# Patient Record
Sex: FEMALE | Race: WHITE | NOT HISPANIC OR LATINO | Employment: UNEMPLOYED | URBAN - NONMETROPOLITAN AREA
[De-identification: names, ages, dates, MRNs, and addresses within clinical notes are randomized per-mention and may not be internally consistent; named-entity substitution may affect disease eponyms.]

---

## 2017-10-10 ENCOUNTER — LAB (OUTPATIENT)
Dept: LAB | Facility: HOSPITAL | Age: 22
End: 2017-10-10

## 2017-10-10 ENCOUNTER — TRANSCRIBE ORDERS (OUTPATIENT)
Dept: LAB | Facility: HOSPITAL | Age: 22
End: 2017-10-10

## 2017-10-10 DIAGNOSIS — R74.8 ELEVATED CK: Primary | ICD-10-CM

## 2017-10-10 DIAGNOSIS — R74.8 ELEVATED CK: ICD-10-CM

## 2017-10-10 LAB — CK SERPL-CCNC: 106 U/L (ref 30–170)

## 2017-10-10 PROCEDURE — 86038 ANTINUCLEAR ANTIBODIES: CPT | Performed by: INTERNAL MEDICINE

## 2017-10-10 PROCEDURE — 82550 ASSAY OF CK (CPK): CPT | Performed by: INTERNAL MEDICINE

## 2017-10-10 PROCEDURE — 36415 COLL VENOUS BLD VENIPUNCTURE: CPT

## 2017-10-10 PROCEDURE — 86235 NUCLEAR ANTIGEN ANTIBODY: CPT | Performed by: INTERNAL MEDICINE

## 2017-10-11 LAB
ANA SER QL: NEGATIVE
ENA JO1 AB SER-ACNC: <0.2 AI (ref 0–0.9)
ENA RNP AB SER-ACNC: 0.2 AI (ref 0–0.9)

## 2017-10-19 LAB — ENA: PM-SCL ANTIBODY*: 8.9 EU/ML

## 2017-12-12 ENCOUNTER — ANESTHESIA (OUTPATIENT)
Dept: PERIOP | Facility: HOSPITAL | Age: 22
End: 2017-12-12

## 2017-12-12 ENCOUNTER — HOSPITAL ENCOUNTER (OUTPATIENT)
Facility: HOSPITAL | Age: 22
Setting detail: OBSERVATION
Discharge: HOME | End: 2017-12-12
Attending: EMERGENCY MEDICINE | Admitting: OBSTETRICS & GYNECOLOGY

## 2017-12-12 ENCOUNTER — ANESTHESIA EVENT (OUTPATIENT)
Dept: PERIOP | Facility: HOSPITAL | Age: 22
End: 2017-12-12

## 2017-12-12 VITALS
TEMPERATURE: 97.6 F | DIASTOLIC BLOOD PRESSURE: 65 MMHG | RESPIRATION RATE: 16 BRPM | BODY MASS INDEX: 27.44 KG/M2 | WEIGHT: 174.8 LBS | OXYGEN SATURATION: 99 % | HEART RATE: 57 BPM | HEIGHT: 67 IN | SYSTOLIC BLOOD PRESSURE: 114 MMHG

## 2017-12-12 DIAGNOSIS — N83.209 RUPTURED OVARIAN CYST: Primary | ICD-10-CM

## 2017-12-12 DIAGNOSIS — R10.2 PELVIC PAIN IN FEMALE: ICD-10-CM

## 2017-12-12 LAB
HCG INTACT+B SERPL-ACNC: <5 MIU/ML
HCT VFR BLD AUTO: 33.5 % (ref 34.5–44)
HGB BLD-MCNC: 10.5 G/DL (ref 11.5–15.5)

## 2017-12-12 PROCEDURE — S0260 H&P FOR SURGERY: HCPCS | Performed by: OBSTETRICS & GYNECOLOGY

## 2017-12-12 PROCEDURE — 25010000002 ONDANSETRON PER 1 MG: Performed by: NURSE ANESTHETIST, CERTIFIED REGISTERED

## 2017-12-12 PROCEDURE — G0378 HOSPITAL OBSERVATION PER HR: HCPCS

## 2017-12-12 PROCEDURE — 96374 THER/PROPH/DIAG INJ IV PUSH: CPT

## 2017-12-12 PROCEDURE — 85014 HEMATOCRIT: CPT | Performed by: OBSTETRICS & GYNECOLOGY

## 2017-12-12 PROCEDURE — 96361 HYDRATE IV INFUSION ADD-ON: CPT

## 2017-12-12 PROCEDURE — 99201: CPT

## 2017-12-12 PROCEDURE — 25010000002 MORPHINE SULFATE (PF) 2 MG/ML SOLUTION: Performed by: OBSTETRICS & GYNECOLOGY

## 2017-12-12 PROCEDURE — 25010000002 NEOSTIGMINE 10 MG/10ML SOLUTION: Performed by: NURSE ANESTHETIST, CERTIFIED REGISTERED

## 2017-12-12 PROCEDURE — 25010000002 DEXAMETHASONE PER 1 MG: Performed by: NURSE ANESTHETIST, CERTIFIED REGISTERED

## 2017-12-12 PROCEDURE — 49320 DIAG LAPARO SEPARATE PROC: CPT | Performed by: OBSTETRICS & GYNECOLOGY

## 2017-12-12 PROCEDURE — 25010000002 MIDAZOLAM PER 1 MG: Performed by: NURSE ANESTHETIST, CERTIFIED REGISTERED

## 2017-12-12 PROCEDURE — 84702 CHORIONIC GONADOTROPIN TEST: CPT | Performed by: OBSTETRICS & GYNECOLOGY

## 2017-12-12 PROCEDURE — 25010000002 PROPOFOL 10 MG/ML EMULSION: Performed by: NURSE ANESTHETIST, CERTIFIED REGISTERED

## 2017-12-12 PROCEDURE — 25010000002 FENTANYL CITRATE (PF) 100 MCG/2ML SOLUTION: Performed by: NURSE ANESTHETIST, CERTIFIED REGISTERED

## 2017-12-12 PROCEDURE — 85018 HEMOGLOBIN: CPT | Performed by: OBSTETRICS & GYNECOLOGY

## 2017-12-12 PROCEDURE — 25010000002 KETOROLAC TROMETHAMINE PER 15 MG: Performed by: NURSE ANESTHETIST, CERTIFIED REGISTERED

## 2017-12-12 RX ORDER — LIDOCAINE HYDROCHLORIDE 10 MG/ML
INJECTION, SOLUTION EPIDURAL; INFILTRATION; INTRACAUDAL; PERINEURAL AS NEEDED
Status: DISCONTINUED | OUTPATIENT
Start: 2017-12-12 | End: 2017-12-12 | Stop reason: SURG

## 2017-12-12 RX ORDER — VITAMIN B COMPLEX
CAPSULE ORAL DAILY
COMMUNITY
End: 2018-09-11

## 2017-12-12 RX ORDER — DULOXETIN HYDROCHLORIDE 20 MG/1
20 CAPSULE, DELAYED RELEASE ORAL DAILY
COMMUNITY
End: 2018-09-11

## 2017-12-12 RX ORDER — OXYCODONE AND ACETAMINOPHEN 7.5; 325 MG/1; MG/1
2 TABLET ORAL EVERY 4 HOURS PRN
Status: DISCONTINUED | OUTPATIENT
Start: 2017-12-12 | End: 2017-12-13 | Stop reason: HOSPADM

## 2017-12-12 RX ORDER — GLYCOPYRROLATE 0.2 MG/ML
INJECTION INTRAMUSCULAR; INTRAVENOUS AS NEEDED
Status: DISCONTINUED | OUTPATIENT
Start: 2017-12-12 | End: 2017-12-12 | Stop reason: SURG

## 2017-12-12 RX ORDER — HYDROMORPHONE HYDROCHLORIDE 1 MG/ML
0.5 INJECTION, SOLUTION INTRAMUSCULAR; INTRAVENOUS; SUBCUTANEOUS
Status: DISCONTINUED | OUTPATIENT
Start: 2017-12-12 | End: 2017-12-12 | Stop reason: HOSPADM

## 2017-12-12 RX ORDER — MIDAZOLAM HYDROCHLORIDE 1 MG/ML
INJECTION INTRAMUSCULAR; INTRAVENOUS AS NEEDED
Status: DISCONTINUED | OUTPATIENT
Start: 2017-12-12 | End: 2017-12-12 | Stop reason: SURG

## 2017-12-12 RX ORDER — TRAMADOL HYDROCHLORIDE 50 MG/1
50 TABLET ORAL EVERY 6 HOURS PRN
Status: DISCONTINUED | OUTPATIENT
Start: 2017-12-12 | End: 2017-12-13 | Stop reason: HOSPADM

## 2017-12-12 RX ORDER — FAMOTIDINE 10 MG/ML
20 INJECTION, SOLUTION INTRAVENOUS ONCE
Status: DISCONTINUED | OUTPATIENT
Start: 2017-12-12 | End: 2017-12-12

## 2017-12-12 RX ORDER — OXYCODONE AND ACETAMINOPHEN 7.5; 325 MG/1; MG/1
1 TABLET ORAL EVERY 4 HOURS PRN
Qty: 15 TABLET | Refills: 0 | Status: SHIPPED | OUTPATIENT
Start: 2017-12-12 | End: 2018-09-11

## 2017-12-12 RX ORDER — SODIUM CHLORIDE, SODIUM LACTATE, POTASSIUM CHLORIDE, CALCIUM CHLORIDE 600; 310; 30; 20 MG/100ML; MG/100ML; MG/100ML; MG/100ML
9 INJECTION, SOLUTION INTRAVENOUS CONTINUOUS
Status: DISCONTINUED | OUTPATIENT
Start: 2017-12-12 | End: 2017-12-12 | Stop reason: HOSPADM

## 2017-12-12 RX ORDER — PROPOFOL 10 MG/ML
VIAL (ML) INTRAVENOUS AS NEEDED
Status: DISCONTINUED | OUTPATIENT
Start: 2017-12-12 | End: 2017-12-12 | Stop reason: SURG

## 2017-12-12 RX ORDER — FAMOTIDINE 20 MG/1
20 TABLET, FILM COATED ORAL ONCE
Status: COMPLETED | OUTPATIENT
Start: 2017-12-12 | End: 2017-12-12

## 2017-12-12 RX ORDER — SODIUM CHLORIDE 0.9 % (FLUSH) 0.9 %
1-10 SYRINGE (ML) INJECTION AS NEEDED
Status: DISCONTINUED | OUTPATIENT
Start: 2017-12-12 | End: 2017-12-12 | Stop reason: HOSPADM

## 2017-12-12 RX ORDER — ONDANSETRON 2 MG/ML
4 INJECTION INTRAMUSCULAR; INTRAVENOUS EVERY 6 HOURS PRN
Status: DISCONTINUED | OUTPATIENT
Start: 2017-12-12 | End: 2017-12-13 | Stop reason: HOSPADM

## 2017-12-12 RX ORDER — ONDANSETRON 4 MG/1
4 TABLET, ORALLY DISINTEGRATING ORAL EVERY 8 HOURS PRN
COMMUNITY
End: 2018-09-11

## 2017-12-12 RX ORDER — DEXAMETHASONE SODIUM PHOSPHATE 4 MG/ML
INJECTION, SOLUTION INTRA-ARTICULAR; INTRALESIONAL; INTRAMUSCULAR; INTRAVENOUS; SOFT TISSUE AS NEEDED
Status: DISCONTINUED | OUTPATIENT
Start: 2017-12-12 | End: 2017-12-12 | Stop reason: SURG

## 2017-12-12 RX ORDER — MAGNESIUM HYDROXIDE 1200 MG/15ML
LIQUID ORAL AS NEEDED
Status: DISCONTINUED | OUTPATIENT
Start: 2017-12-12 | End: 2017-12-12 | Stop reason: HOSPADM

## 2017-12-12 RX ORDER — AMITRIPTYLINE HYDROCHLORIDE 10 MG/1
10 TABLET, FILM COATED ORAL NIGHTLY
COMMUNITY
End: 2019-08-12

## 2017-12-12 RX ORDER — MORPHINE SULFATE 2 MG/ML
2 INJECTION, SOLUTION INTRAMUSCULAR; INTRAVENOUS ONCE
Status: COMPLETED | OUTPATIENT
Start: 2017-12-12 | End: 2017-12-12

## 2017-12-12 RX ORDER — SODIUM CHLORIDE 9 MG/ML
INJECTION, SOLUTION INTRAVENOUS AS NEEDED
Status: DISCONTINUED | OUTPATIENT
Start: 2017-12-12 | End: 2017-12-12 | Stop reason: HOSPADM

## 2017-12-12 RX ORDER — FENTANYL CITRATE 50 UG/ML
50 INJECTION, SOLUTION INTRAMUSCULAR; INTRAVENOUS
Status: DISCONTINUED | OUTPATIENT
Start: 2017-12-12 | End: 2017-12-12 | Stop reason: HOSPADM

## 2017-12-12 RX ORDER — DULOXETIN HYDROCHLORIDE 20 MG/1
20 CAPSULE, DELAYED RELEASE ORAL DAILY
Status: DISCONTINUED | OUTPATIENT
Start: 2017-12-12 | End: 2017-12-13 | Stop reason: HOSPADM

## 2017-12-12 RX ORDER — OXYCODONE AND ACETAMINOPHEN 7.5; 325 MG/1; MG/1
2 TABLET ORAL EVERY 4 HOURS PRN
Status: DISCONTINUED | OUTPATIENT
Start: 2017-12-12 | End: 2017-12-12 | Stop reason: HOSPADM

## 2017-12-12 RX ORDER — LIDOCAINE HYDROCHLORIDE 10 MG/ML
0.5 INJECTION, SOLUTION EPIDURAL; INFILTRATION; INTRACAUDAL; PERINEURAL ONCE AS NEEDED
Status: DISCONTINUED | OUTPATIENT
Start: 2017-12-12 | End: 2017-12-12

## 2017-12-12 RX ORDER — ACETAMINOPHEN 325 MG/1
650 TABLET ORAL EVERY 4 HOURS PRN
Status: DISCONTINUED | OUTPATIENT
Start: 2017-12-12 | End: 2017-12-12 | Stop reason: HOSPADM

## 2017-12-12 RX ORDER — NEOSTIGMINE METHYLSULFATE 1 MG/ML
INJECTION, SOLUTION INTRAVENOUS AS NEEDED
Status: DISCONTINUED | OUTPATIENT
Start: 2017-12-12 | End: 2017-12-12 | Stop reason: SURG

## 2017-12-12 RX ORDER — ONDANSETRON 2 MG/ML
INJECTION INTRAMUSCULAR; INTRAVENOUS AS NEEDED
Status: DISCONTINUED | OUTPATIENT
Start: 2017-12-12 | End: 2017-12-12 | Stop reason: SURG

## 2017-12-12 RX ORDER — FENTANYL CITRATE 50 UG/ML
INJECTION, SOLUTION INTRAMUSCULAR; INTRAVENOUS AS NEEDED
Status: DISCONTINUED | OUTPATIENT
Start: 2017-12-12 | End: 2017-12-12 | Stop reason: SURG

## 2017-12-12 RX ORDER — ROCURONIUM BROMIDE 10 MG/ML
INJECTION, SOLUTION INTRAVENOUS AS NEEDED
Status: DISCONTINUED | OUTPATIENT
Start: 2017-12-12 | End: 2017-12-12 | Stop reason: SURG

## 2017-12-12 RX ORDER — IBUPROFEN 600 MG/1
600 TABLET ORAL EVERY 4 HOURS PRN
Qty: 40 TABLET | Refills: 0 | Status: SHIPPED | OUTPATIENT
Start: 2017-12-12 | End: 2018-09-11

## 2017-12-12 RX ORDER — MORPHINE SULFATE 2 MG/ML
2 INJECTION, SOLUTION INTRAMUSCULAR; INTRAVENOUS EVERY 4 HOURS PRN
Status: DISCONTINUED | OUTPATIENT
Start: 2017-12-12 | End: 2017-12-13 | Stop reason: HOSPADM

## 2017-12-12 RX ORDER — IBUPROFEN 600 MG/1
600 TABLET ORAL EVERY 4 HOURS PRN
Status: DISCONTINUED | OUTPATIENT
Start: 2017-12-12 | End: 2017-12-13 | Stop reason: HOSPADM

## 2017-12-12 RX ORDER — ONDANSETRON 4 MG/1
4 TABLET, FILM COATED ORAL EVERY 6 HOURS PRN
Status: DISCONTINUED | OUTPATIENT
Start: 2017-12-12 | End: 2017-12-13 | Stop reason: HOSPADM

## 2017-12-12 RX ORDER — SODIUM CHLORIDE, SODIUM LACTATE, POTASSIUM CHLORIDE, CALCIUM CHLORIDE 600; 310; 30; 20 MG/100ML; MG/100ML; MG/100ML; MG/100ML
125 INJECTION, SOLUTION INTRAVENOUS CONTINUOUS
Status: DISCONTINUED | OUTPATIENT
Start: 2017-12-12 | End: 2017-12-12 | Stop reason: HOSPADM

## 2017-12-12 RX ORDER — KETOROLAC TROMETHAMINE 30 MG/ML
INJECTION, SOLUTION INTRAMUSCULAR; INTRAVENOUS AS NEEDED
Status: DISCONTINUED | OUTPATIENT
Start: 2017-12-12 | End: 2017-12-12 | Stop reason: SURG

## 2017-12-12 RX ORDER — AMITRIPTYLINE HYDROCHLORIDE 10 MG/1
10 TABLET, FILM COATED ORAL NIGHTLY
Status: DISCONTINUED | OUTPATIENT
Start: 2017-12-12 | End: 2017-12-13 | Stop reason: HOSPADM

## 2017-12-12 RX ORDER — BUPIVACAINE HYDROCHLORIDE AND EPINEPHRINE 5; 5 MG/ML; UG/ML
INJECTION, SOLUTION PERINEURAL AS NEEDED
Status: DISCONTINUED | OUTPATIENT
Start: 2017-12-12 | End: 2017-12-12 | Stop reason: HOSPADM

## 2017-12-12 RX ORDER — ONDANSETRON 2 MG/ML
4 INJECTION INTRAMUSCULAR; INTRAVENOUS ONCE AS NEEDED
Status: COMPLETED | OUTPATIENT
Start: 2017-12-12 | End: 2017-12-12

## 2017-12-12 RX ORDER — OXYCODONE AND ACETAMINOPHEN 7.5; 325 MG/1; MG/1
1 TABLET ORAL EVERY 4 HOURS PRN
Status: DISCONTINUED | OUTPATIENT
Start: 2017-12-12 | End: 2017-12-12 | Stop reason: HOSPADM

## 2017-12-12 RX ORDER — NALOXONE HCL 0.4 MG/ML
0.4 VIAL (ML) INJECTION
Status: DISCONTINUED | OUTPATIENT
Start: 2017-12-12 | End: 2017-12-13 | Stop reason: HOSPADM

## 2017-12-12 RX ADMIN — MIDAZOLAM HYDROCHLORIDE 2 MG: 1 INJECTION, SOLUTION INTRAMUSCULAR; INTRAVENOUS at 13:06

## 2017-12-12 RX ADMIN — FENTANYL CITRATE 100 MCG: 50 INJECTION, SOLUTION INTRAMUSCULAR; INTRAVENOUS at 13:06

## 2017-12-12 RX ADMIN — KETOROLAC TROMETHAMINE 30 MG: 30 INJECTION, SOLUTION INTRAMUSCULAR at 13:35

## 2017-12-12 RX ADMIN — ROCURONIUM BROMIDE 25 MG: 10 SOLUTION INTRAVENOUS at 13:11

## 2017-12-12 RX ADMIN — OXYCODONE HYDROCHLORIDE AND ACETAMINOPHEN 1 TABLET: 7.5; 325 TABLET ORAL at 03:45

## 2017-12-12 RX ADMIN — NEOSTIGMINE METHYLSULFATE 4 MG: 1 INJECTION, SOLUTION INTRAVENOUS at 13:37

## 2017-12-12 RX ADMIN — FENTANYL CITRATE 50 MCG: 50 INJECTION INTRAMUSCULAR; INTRAVENOUS at 14:06

## 2017-12-12 RX ADMIN — FAMOTIDINE 20 MG: 20 TABLET, FILM COATED ORAL at 12:52

## 2017-12-12 RX ADMIN — DEXAMETHASONE SODIUM PHOSPHATE 4 MG: 4 INJECTION, SOLUTION INTRAMUSCULAR; INTRAVENOUS at 13:25

## 2017-12-12 RX ADMIN — SODIUM CHLORIDE, POTASSIUM CHLORIDE, SODIUM LACTATE AND CALCIUM CHLORIDE 125 ML/HR: 600; 310; 30; 20 INJECTION, SOLUTION INTRAVENOUS at 14:29

## 2017-12-12 RX ADMIN — ONDANSETRON 4 MG: 2 INJECTION INTRAMUSCULAR; INTRAVENOUS at 13:25

## 2017-12-12 RX ADMIN — PROPOFOL 180 MG: 10 INJECTION, EMULSION INTRAVENOUS at 13:11

## 2017-12-12 RX ADMIN — SODIUM CHLORIDE, POTASSIUM CHLORIDE, SODIUM LACTATE AND CALCIUM CHLORIDE 125 ML/HR: 600; 310; 30; 20 INJECTION, SOLUTION INTRAVENOUS at 10:00

## 2017-12-12 RX ADMIN — FENTANYL CITRATE 50 MCG: 50 INJECTION INTRAMUSCULAR; INTRAVENOUS at 14:27

## 2017-12-12 RX ADMIN — LIDOCAINE HYDROCHLORIDE 80 MG: 10 INJECTION, SOLUTION EPIDURAL; INFILTRATION; INTRACAUDAL; PERINEURAL at 13:11

## 2017-12-12 RX ADMIN — MORPHINE SULFATE 2 MG: 2 INJECTION, SOLUTION INTRAMUSCULAR; INTRAVENOUS at 11:17

## 2017-12-12 RX ADMIN — SODIUM CHLORIDE, POTASSIUM CHLORIDE, SODIUM LACTATE AND CALCIUM CHLORIDE 9 ML/HR: 600; 310; 30; 20 INJECTION, SOLUTION INTRAVENOUS at 12:52

## 2017-12-12 RX ADMIN — GLYCOPYRROLATE 0.6 MG: 0.2 INJECTION, SOLUTION INTRAMUSCULAR; INTRAVENOUS at 13:37

## 2017-12-12 RX ADMIN — ONDANSETRON 4 MG: 2 INJECTION INTRAMUSCULAR; INTRAVENOUS at 14:52

## 2017-12-12 NOTE — PLAN OF CARE
Problem: Patient Care Overview (Adult)  Goal: Plan of Care Review  Outcome: Ongoing (interventions implemented as appropriate)    12/12/17 0500   Coping/Psychosocial Response Interventions   Plan Of Care Reviewed With patient   Patient Care Overview   Progress progress toward functional goals as expected   Outcome Evaluation   Outcome Summary/Follow up Plan VSS. pt presented to unit with pain in RLQ. med given in ER. Waiting for new orders.        Goal: Adult Individualization and Mutuality  Outcome: Ongoing (interventions implemented as appropriate)  Goal: Discharge Needs Assessment  Outcome: Ongoing (interventions implemented as appropriate)

## 2017-12-12 NOTE — H&P
KARIS Arellano  Ashish Wilkerson  : 1995  MRN: [id number]  CSN: [id number]    History and Physical    Subjective   Ashish Wilkerson is a 22 y.o. year old No obstetric history on file. who presented in transfer with abdominal pain due to a ruptured hemorrhagic ovarian cyst.  The CT scan seem to exclude acute appendicitis she's been afebrile her white count is normal.  Her pain was acute in onset about 1800 hrs. on 17.  Her hematocrit is stable she is in moderate to severe pain which is unrelenting.  She does not appear to be able to participate and the activities of daily living.  After a significant period of observation her pain is not improving.  We discussed management options which include observation and analgesia as well as laparoscopic management of the hemoperitoneum.  The patient is well aware now that her pain may be different it may be improved it may be worsened in the context of surgical management.  She is aware of the risk of surgery and general and those specific to laparoscopy.  Her blood pregnancy test was negative at the referring hospital.  Her last menstrual period was 2 weeks ago.  She is sexually active she is not using birth control    Past Medical History:   Diagnosis Date   • Migraine      Past Surgical History:   Procedure Laterality Date   •  SECTION     • DENTAL PROCEDURE       Smoking status: Never Smoker                                                                 Smokeless status: Not on file                         Current Facility-Administered Medications:   •  acetaminophen (TYLENOL) tablet 650 mg, 650 mg, Oral, Q4H PRN, Dami Allred MD  •  oxyCODONE-acetaminophen (PERCOCET) 7.5-325 MG per tablet 1 tablet, 1 tablet, Oral, Q4H PRN, Dami Allred MD, 1 tablet at 17 0345  •  oxyCODONE-acetaminophen (PERCOCET) 7.5-325 MG per tablet 2 tablet, 2 tablet, Oral, Q4H PRN, Dami Allred MD  •  sodium chloride 0.9 % flush 1-10 mL, 1-10 mL,  "Intravenous, PRN, Dami Allred MD    No Known Allergies    Review of Systems      Objective   /56  Pulse 69  Temp 98.3 °F (36.8 °C)  Resp 18  Ht 170.2 cm (67\")  Wt 79.3 kg (174 lb 12.8 oz)  SpO2 98%  BMI 27.38 kg/m2  General: well developed; well nourished  moderately distressed   Heart: regular rate and rhythm, S1, S2 normal, no murmur, click, rub or gallop   Lungs: breathing is unlabored   Abdomen: no umbilical or inginual hernias are present  no hepato-splenomegaly  Her abdomen appears mildly distended there is diffuse tenderness throughout the abdomen with some rebound   Pelvis:: The pelvic exam will be performed under the benefit of anesthesia   Labs  Lab Results   Component Value Date     (L) 03/17/2015    HGB 10.5 (L) 12/12/2017    HCT 33.5 (L) 12/12/2017    WBC 21.5 (H) 03/17/2015        Assessment   1. Ruptured hemorrhagic ovarian cyst with persistent moderate to severe abdominal pain     Plan   1. Diagnostic laparoscopy    Dandy Shelby MD  12/12/2017  9:49 AM           "

## 2017-12-12 NOTE — BRIEF OP NOTE
DIAGNOSTIC LAPAROSCOPY  Progress Note    Ashish Wilkerson  12/12/2017    Pre-op Diagnosis:   Ruptured ovarian cyst [N83.209]       Post-Op Diagnosis Codes:     Normal pelvis    Procedure/CPT® Codes:      Procedure(s):  DIAGNOSTIC LAPAROSCOPY    Surgeon(s):  Dandy Shelby MD    Anesthesia: General    Staff:   Circulator: Jaycob Vicente RN  Scrub Person: Alyssa Clark  Assistant: ALFA Bird    Estimated Blood Loss: none    Urine Voided: 250 mL    Specimens:                None      Drains:   Urethral Catheter 12/12/17 1326 100% silicone 16 (Active)           Findings: Normal pelvis    Complications: None      Dandy Shelby MD     Date: 12/12/2017  Time: 1:44 PM

## 2017-12-12 NOTE — PLAN OF CARE
Problem: Perioperative Period (Adult)  Goal: Signs and Symptoms of Listed Potential Problems Will be Absent or Manageable (Perioperative Period)  Outcome: Ongoing (interventions implemented as appropriate)    12/12/17 1233   Perioperative Period   Problems Assessed (Perioperative Period) pain   Problems Present (Perioperative Period) none

## 2017-12-12 NOTE — OP NOTE
DIAGNOSTIC LAPAROSCOPY  Procedure Note    Ashish Wilkerson  2017    Pre-op Diagnosis:   Ruptured ovarian cyst [N83.209]    Post-op Diagnosis:     Pelvic pain    Procedure(s):  DIAGNOSTIC LAPAROSCOPY    Surgeon(s):  Dandy Shelby MD    Anesthesia: General    Staff:   Circulator: Jaycob Vicente RN  Scrub Person: Alyssa Clark  Assistant: ALFA Bird    Estimated Blood Loss: none    Specimens:                * No orders in the log *      Drains:   Urethral Catheter 17 1326 100% silicone 16 (Active)           Indications: This patient is a 22-year-old female admitted the in transfer from [facility] with the acute onset of pelvic pain.  The working diagnosis by her clinical presentation and imaging studies which had excluded the possibility of appendicitis was a ruptured hemorrhagic ovarian cyst.  Her hematocrit was stable.  However her pain was profound and incapacitating.  She was unable to tolerate a further period of observation and the decision was made to proceed with diagnostic laparoscopy for both diagnosis and treatment.  She was counseled extensively with respect to the risk of surgery and general and the risk of this procedure specifically further the patient was made explicitly aware that this procedure old might not cause her pain to be improved and indeed may cause her to have worse pain.    Findings: At the time of laparoscopy her uterus was retroverted normal in size and shape and freely mobile and free of adhesions.  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  There was no evidence of endometriosis of her peritoneum or ovaries.  There was minimal blood tinged fluid in her cul-de-sac.  The fallopian tubes were normal in caliber and character.  The ovaries were normal in size shape.  They were freely mobile and free of adhesions there were no cysts on either ovary.   Her appendix was well visualized and appeared normal the upper abdomen was well visualized and appeared normal both in the right upper quadrant and left upper quadrant area and the left lower quadrant appeared normal    Operative procedure: The patient was taken to the operating room and after the induction of general anesthesia was placed in the dorsal lithotomy position where her abdomen perineum and vagina were sterilely prepped and draped in the usual fashion.  A Mckeon catheter was placed in the bladder.  The acorn uterine manipulator was placed in the cervix.  Through a 1 cm umbilical incision and the 12 mm trocar was inserted under direct laparoscopic visualization.  The abdomen was insufflated with carbon dioxide gas and the 5 mm trocar was inserted through a 1 cm suprapubic incision.  The above laparoscopic findings were noted and photographs taken.  What little lead tinged fluid that was in the cul-de-sac was irrigated and suctioned.  The 5 mm trocar was removed under direct laparoscopic visualization.  The gas was allowed to escape and the 12 mm trocar was removed.  The incisions were closed with 4-0 Vicryl in a subcuticular fashion.  The patient tolerated the procedure well and was taken to the recovery room in stable condition  .   Complications: There were no complications      Dandy Shelby MD     Date: 12/12/2017  Time: 1:49 PM

## 2018-01-11 ENCOUNTER — HOSPITAL ENCOUNTER (OUTPATIENT)
Dept: OTHER | Age: 23
Discharge: HOME | End: 2018-01-11
Attending: NURSE PRACTITIONER | Admitting: NURSE PRACTITIONER

## 2018-01-11 DIAGNOSIS — R10.32 ABDOMINAL PAIN, LEFT LOWER QUADRANT: ICD-10-CM

## 2018-01-19 ENCOUNTER — HOSPITAL ENCOUNTER (OUTPATIENT)
Dept: GENERAL RADIOLOGY | Age: 23
Discharge: HOME | End: 2018-01-19
Attending: NURSE PRACTITIONER | Admitting: NURSE PRACTITIONER

## 2018-01-19 DIAGNOSIS — R10.32 LEFT LOWER QUADRANT PAIN: ICD-10-CM

## 2018-01-19 DIAGNOSIS — R10.32 ABDOMINAL PAIN, LEFT LOWER QUADRANT: ICD-10-CM

## 2018-03-05 ENCOUNTER — HOSPITAL ENCOUNTER (EMERGENCY)
Facility: HOSPITAL | Age: 23
Discharge: HOME | End: 2018-03-05
Attending: EMERGENCY MEDICINE | Admitting: EMERGENCY MEDICINE

## 2018-03-05 VITALS
DIASTOLIC BLOOD PRESSURE: 71 MMHG | RESPIRATION RATE: 16 BRPM | HEART RATE: 81 BPM | SYSTOLIC BLOOD PRESSURE: 123 MMHG | TEMPERATURE: 98 F | WEIGHT: 170 LBS | OXYGEN SATURATION: 99 % | HEIGHT: 67 IN | BODY MASS INDEX: 26.68 KG/M2

## 2018-03-05 DIAGNOSIS — J02.9 SORE THROAT: Primary | ICD-10-CM

## 2018-03-05 LAB — S PYO AG THROAT QL: NEGATIVE

## 2018-03-05 PROCEDURE — 87880 STREP A ASSAY W/OPTIC: CPT | Performed by: PHYSICIAN ASSISTANT

## 2018-03-05 PROCEDURE — 87081 CULTURE SCREEN ONLY: CPT | Performed by: PHYSICIAN ASSISTANT

## 2018-03-05 PROCEDURE — 99283 EMERGENCY DEPT VISIT LOW MDM: CPT

## 2018-03-05 RX ORDER — NAPROXEN 500 MG/1
500 TABLET ORAL 2 TIMES DAILY WITH MEALS
Qty: 20 TABLET | Refills: 0 | Status: SHIPPED | OUTPATIENT
Start: 2018-03-05 | End: 2018-09-11

## 2018-03-06 NOTE — ED PROVIDER NOTES
Subjective   HPI Comments: 22-year-old female complaining of 4 day history of bilateral earache and 2 day history of sore throat.  She denies any fever, cough, congestion, abdominal pain, vomiting or diarrhea.  Her last menstrual cycle was last week of February and she denies pregnancy.  Mom has been giving her numbing drops for her earache with out relief.  Patient states she has a history of strep throat and feels like she has strep.      History provided by:  Patient  History limited by: no limits.   used: No        Review of Systems   Constitutional: Negative for activity change, appetite change, fatigue and fever.   HENT: Positive for sore throat. Negative for congestion, ear pain, rhinorrhea and sneezing.    Eyes: Negative for pain, discharge and redness.   Respiratory: Negative for cough, shortness of breath and wheezing.    Cardiovascular: Negative.    Gastrointestinal: Positive for nausea. Negative for abdominal pain, constipation, diarrhea and vomiting.   Endocrine: Negative.    Genitourinary: Negative for difficulty urinating, dysuria and frequency.   Musculoskeletal: Negative for back pain and neck pain.   Skin: Negative for color change, pallor and rash.   Allergic/Immunologic: Negative.    Neurological: Negative.    Hematological: Negative.    Psychiatric/Behavioral: Negative.    All other systems reviewed and are negative.      Past Medical History:   Diagnosis Date   • Migraine        No Known Allergies    Past Surgical History:   Procedure Laterality Date   •  SECTION     • DENTAL PROCEDURE     • DIAGNOSTIC LAPAROSCOPY N/A 2017    Procedure: DIAGNOSTIC LAPAROSCOPY;  Surgeon: Dandy Shelby MD;  Location: [facility];  Service:        History reviewed. No pertinent family history.    Social History     Social History   • Marital status:      Social History Main Topics   • Smoking status: Never Smoker   • Alcohol use No   • Drug use: No   • Sexual activity:  Defer           Objective   Physical Exam   Constitutional: She is oriented to person, place, and time. She appears well-developed and well-nourished. No distress.   HENT:   Head: Normocephalic and atraumatic.   Right Ear: External ear normal.   Left Ear: External ear normal.   Nose: Nose normal.   Mouth/Throat: Oropharynx is clear and moist. No oropharyngeal exudate.   Eyes: Conjunctivae and EOM are normal. Pupils are equal, round, and reactive to light. Right eye exhibits no discharge. Left eye exhibits no discharge. No scleral icterus.   Neck: Normal range of motion. Neck supple. No JVD present. No tracheal deviation present.   Cardiovascular: Normal rate, regular rhythm and normal heart sounds.    Pulmonary/Chest: Effort normal and breath sounds normal. No stridor. No respiratory distress. She has no wheezes. She has no rales.   Abdominal: Soft. Bowel sounds are normal. She exhibits no distension and no mass. There is no tenderness. There is no rebound and no guarding. No hernia.   Musculoskeletal: Normal range of motion. She exhibits no edema, tenderness or deformity.   Neurological: She is alert and oriented to person, place, and time. No cranial nerve deficit. Coordination normal.   Skin: Skin is warm and dry. No rash noted. She is not diaphoretic. No erythema.   Psychiatric: She has a normal mood and affect. Her behavior is normal. Judgment and thought content normal.   Nursing note and vitals reviewed.      Procedures         ED Course  ED Course   Comment By Time   Strep screen negative. Katy Valverde PA-C 03/05 2140   Discussed dx of pharyngitis with pt, advised culture is pending and hospital will contact patient if positive.  Prescription given for naproxen and patient to alternate with Tylenol, use throat lozenges and warm salt water gargles.  All questions answered to her satisfaction, she is agreeable to the treatment plan and to be discharged home. Katy Valverde PA-C 03/05 2144                  [facility]  Number  of Diagnoses or Management Options  Sore throat: new and requires workup     Amount and/or Complexity of Data Reviewed  Clinical lab tests: ordered and reviewed    Risk of Complications, Morbidity, and/or Mortality  Presenting problems: moderate  Diagnostic procedures: low  Management options: moderate    Patient Progress  Patient progress: stable      Final diagnoses:   Sore throat            Katy Valverde PA-C  03/05/18 3932

## 2018-03-06 NOTE — DISCHARGE INSTRUCTIONS
Take naproxen with meals to avoid upset stomach.  You may alternate with Tylenol every 4 hours.  Warm salt water gargles.  Throat lozenges.  Have  recheck with your primary care physician in 2-3 days if not improved.  If your culture is positive the hospital will contact you with further instruction.

## 2018-03-07 LAB — BACTERIA SPEC AEROBE CULT: NORMAL

## 2018-06-25 ENCOUNTER — HOSPITAL ENCOUNTER (EMERGENCY)
Facility: HOSPITAL | Age: 23
Discharge: HOME | End: 2018-06-25
Attending: EMERGENCY MEDICINE | Admitting: EMERGENCY MEDICINE

## 2018-06-25 ENCOUNTER — APPOINTMENT (OUTPATIENT)
Dept: CT IMAGING | Facility: HOSPITAL | Age: 23
End: 2018-06-25

## 2018-06-25 VITALS
TEMPERATURE: 98.1 F | RESPIRATION RATE: 18 BRPM | HEART RATE: 75 BPM | HEIGHT: 67 IN | WEIGHT: 180.4 LBS | DIASTOLIC BLOOD PRESSURE: 60 MMHG | BODY MASS INDEX: 28.31 KG/M2 | OXYGEN SATURATION: 100 % | SYSTOLIC BLOOD PRESSURE: 116 MMHG

## 2018-06-25 DIAGNOSIS — N94.6 DYSMENORRHEA: Primary | ICD-10-CM

## 2018-06-25 LAB
ALBUMIN SERPL-MCNC: 4.3 G/DL (ref 3.5–5)
ALBUMIN/GLOB SERPL: 1.3 G/DL (ref 1–2)
ALP SERPL-CCNC: 75 U/L (ref 38–126)
ALT SERPL W P-5'-P-CCNC: 19 U/L (ref 13–69)
ANION GAP SERPL CALCULATED.3IONS-SCNC: 14.8 MMOL/L (ref 10–20)
AST SERPL-CCNC: 22 U/L (ref 15–46)
B-HCG UR QL: NEGATIVE
BACTERIA UR QL AUTO: NORMAL /HPF
BASOPHILS # BLD AUTO: 0.07 10*3/MM3 (ref 0–0.2)
BASOPHILS NFR BLD AUTO: 0.8 % (ref 0–2.5)
BILIRUB SERPL-MCNC: 0.5 MG/DL (ref 0.2–1.3)
BILIRUB UR QL STRIP: NEGATIVE
BUN BLD-MCNC: 9 MG/DL (ref 7–20)
BUN/CREAT SERPL: 12.9 (ref 7.1–23.5)
CALCIUM SPEC-SCNC: 9 MG/DL (ref 8.4–10.2)
CHLORIDE SERPL-SCNC: 104 MMOL/L (ref 98–107)
CLARITY UR: CLEAR
CO2 SERPL-SCNC: 27 MMOL/L (ref 26–30)
COLOR UR: YELLOW
CREAT BLD-MCNC: 0.7 MG/DL (ref 0.6–1.3)
DEPRECATED RDW RBC AUTO: 42.2 FL (ref 37–54)
EOSINOPHIL # BLD AUTO: 0.18 10*3/MM3 (ref 0–0.7)
EOSINOPHIL NFR BLD AUTO: 2.1 % (ref 0–7)
ERYTHROCYTE [DISTWIDTH] IN BLOOD BY AUTOMATED COUNT: 14.5 % (ref 11.5–14.5)
GFR SERPL CREATININE-BSD FRML MDRD: 106 ML/MIN/1.73
GLOBULIN UR ELPH-MCNC: 3.2 GM/DL
GLUCOSE BLD-MCNC: 83 MG/DL (ref 74–98)
GLUCOSE UR STRIP-MCNC: NEGATIVE MG/DL
HCT VFR BLD AUTO: 35.8 % (ref 37–47)
HGB BLD-MCNC: 11.9 G/DL (ref 12–16)
HGB UR QL STRIP.AUTO: ABNORMAL
HOLD SPECIMEN: NORMAL
HOLD SPECIMEN: NORMAL
HYALINE CASTS UR QL AUTO: NORMAL /LPF
IMM GRANULOCYTES # BLD: 0.04 10*3/MM3 (ref 0–0.06)
IMM GRANULOCYTES NFR BLD: 0.5 % (ref 0–0.6)
KETONES UR QL STRIP: NEGATIVE
LEUKOCYTE ESTERASE UR QL STRIP.AUTO: NEGATIVE
LIPASE SERPL-CCNC: 33 U/L (ref 23–300)
LYMPHOCYTES # BLD AUTO: 2.21 10*3/MM3 (ref 0.6–3.4)
LYMPHOCYTES NFR BLD AUTO: 25.9 % (ref 10–50)
MCH RBC QN AUTO: 26.6 PG (ref 27–31)
MCHC RBC AUTO-ENTMCNC: 33.2 G/DL (ref 30–37)
MCV RBC AUTO: 80.1 FL (ref 81–99)
MONOCYTES # BLD AUTO: 0.63 10*3/MM3 (ref 0–0.9)
MONOCYTES NFR BLD AUTO: 7.4 % (ref 0–12)
NEUTROPHILS # BLD AUTO: 5.4 10*3/MM3 (ref 2–6.9)
NEUTROPHILS NFR BLD AUTO: 63.3 % (ref 37–80)
NITRITE UR QL STRIP: NEGATIVE
NRBC BLD MANUAL-RTO: 0 /100 WBC (ref 0–0)
PH UR STRIP.AUTO: 6 [PH] (ref 5–8)
PLATELET # BLD AUTO: 249 10*3/MM3 (ref 130–400)
PMV BLD AUTO: 11.3 FL (ref 6–12)
POTASSIUM BLD-SCNC: 3.8 MMOL/L (ref 3.5–5.1)
PROT SERPL-MCNC: 7.5 G/DL (ref 6.3–8.2)
PROT UR QL STRIP: NEGATIVE
RBC # BLD AUTO: 4.47 10*6/MM3 (ref 4.2–5.4)
RBC # UR: NORMAL /HPF
REF LAB TEST METHOD: NORMAL
SODIUM BLD-SCNC: 142 MMOL/L (ref 137–145)
SP GR UR STRIP: 1.02 (ref 1–1.03)
SQUAMOUS #/AREA URNS HPF: NORMAL /HPF
UROBILINOGEN UR QL STRIP: ABNORMAL
WBC NRBC COR # BLD: 8.53 10*3/MM3 (ref 4.8–10.8)
WBC UR QL AUTO: NORMAL /HPF
WHOLE BLOOD HOLD SPECIMEN: NORMAL
WHOLE BLOOD HOLD SPECIMEN: NORMAL

## 2018-06-25 PROCEDURE — 99284 EMERGENCY DEPT VISIT MOD MDM: CPT

## 2018-06-25 PROCEDURE — 25010000002 ONDANSETRON PER 1 MG: Performed by: PHYSICIAN ASSISTANT

## 2018-06-25 PROCEDURE — 96374 THER/PROPH/DIAG INJ IV PUSH: CPT

## 2018-06-25 PROCEDURE — 25010000002 KETOROLAC TROMETHAMINE PER 15 MG: Performed by: PHYSICIAN ASSISTANT

## 2018-06-25 PROCEDURE — 81025 URINE PREGNANCY TEST: CPT | Performed by: PHYSICIAN ASSISTANT

## 2018-06-25 PROCEDURE — 85025 COMPLETE CBC W/AUTO DIFF WBC: CPT | Performed by: PHYSICIAN ASSISTANT

## 2018-06-25 PROCEDURE — 83690 ASSAY OF LIPASE: CPT | Performed by: PHYSICIAN ASSISTANT

## 2018-06-25 PROCEDURE — 96375 TX/PRO/DX INJ NEW DRUG ADDON: CPT

## 2018-06-25 PROCEDURE — 25010000002 IOPAMIDOL 61 % SOLUTION: Performed by: EMERGENCY MEDICINE

## 2018-06-25 PROCEDURE — 74177 CT ABD & PELVIS W/CONTRAST: CPT

## 2018-06-25 PROCEDURE — 81001 URINALYSIS AUTO W/SCOPE: CPT | Performed by: PHYSICIAN ASSISTANT

## 2018-06-25 PROCEDURE — 80053 COMPREHEN METABOLIC PANEL: CPT | Performed by: PHYSICIAN ASSISTANT

## 2018-06-25 RX ORDER — ONDANSETRON 2 MG/ML
4 INJECTION INTRAMUSCULAR; INTRAVENOUS ONCE
Status: COMPLETED | OUTPATIENT
Start: 2018-06-25 | End: 2018-06-25

## 2018-06-25 RX ORDER — SODIUM CHLORIDE 0.9 % (FLUSH) 0.9 %
10 SYRINGE (ML) INJECTION AS NEEDED
Status: DISCONTINUED | OUTPATIENT
Start: 2018-06-25 | End: 2018-06-25 | Stop reason: HOSPADM

## 2018-06-25 RX ORDER — KETOROLAC TROMETHAMINE 30 MG/ML
30 INJECTION, SOLUTION INTRAMUSCULAR; INTRAVENOUS ONCE
Status: COMPLETED | OUTPATIENT
Start: 2018-06-25 | End: 2018-06-25

## 2018-06-25 RX ADMIN — IOPAMIDOL 100 ML: 612 INJECTION, SOLUTION INTRAVENOUS at 15:11

## 2018-06-25 RX ADMIN — KETOROLAC TROMETHAMINE 30 MG: 30 INJECTION, SOLUTION INTRAMUSCULAR at 13:49

## 2018-06-25 RX ADMIN — ONDANSETRON 4 MG: 2 INJECTION INTRAMUSCULAR; INTRAVENOUS at 13:49

## 2018-07-27 ENCOUNTER — HOSPITAL ENCOUNTER (EMERGENCY)
Facility: HOSPITAL | Age: 23
Discharge: HOME | End: 2018-07-27
Attending: EMERGENCY MEDICINE | Admitting: EMERGENCY MEDICINE

## 2018-07-27 VITALS
HEIGHT: 67 IN | SYSTOLIC BLOOD PRESSURE: 137 MMHG | HEART RATE: 89 BPM | DIASTOLIC BLOOD PRESSURE: 85 MMHG | OXYGEN SATURATION: 99 % | WEIGHT: 183 LBS | TEMPERATURE: 98.3 F | RESPIRATION RATE: 16 BRPM | BODY MASS INDEX: 28.72 KG/M2

## 2018-07-27 DIAGNOSIS — N93.9 VAGINAL BLEEDING: Primary | ICD-10-CM

## 2018-07-27 LAB
ABO GROUP BLD: NORMAL
ALBUMIN SERPL-MCNC: 4.2 G/DL (ref 3.5–5)
ALBUMIN/GLOB SERPL: 1.3 G/DL (ref 1–2)
ALP SERPL-CCNC: 87 U/L (ref 38–126)
ALT SERPL W P-5'-P-CCNC: 21 U/L (ref 13–69)
ANION GAP SERPL CALCULATED.3IONS-SCNC: 10.9 MMOL/L (ref 10–20)
AST SERPL-CCNC: 19 U/L (ref 15–46)
BACTERIA UR QL AUTO: ABNORMAL /HPF
BASOPHILS # BLD AUTO: 0.04 10*3/MM3 (ref 0–0.2)
BASOPHILS NFR BLD AUTO: 0.7 % (ref 0–2.5)
BILIRUB SERPL-MCNC: 0.5 MG/DL (ref 0.2–1.3)
BILIRUB UR QL STRIP: NEGATIVE
BUN BLD-MCNC: 9 MG/DL (ref 7–20)
BUN/CREAT SERPL: 12.9 (ref 7.1–23.5)
CALCIUM SPEC-SCNC: 9.2 MG/DL (ref 8.4–10.2)
CHLORIDE SERPL-SCNC: 106 MMOL/L (ref 98–107)
CLARITY UR: CLEAR
CO2 SERPL-SCNC: 26 MMOL/L (ref 26–30)
COLOR UR: YELLOW
CREAT BLD-MCNC: 0.7 MG/DL (ref 0.6–1.3)
DEPRECATED RDW RBC AUTO: 41.1 FL (ref 37–54)
EOSINOPHIL # BLD AUTO: 0.11 10*3/MM3 (ref 0–0.7)
EOSINOPHIL NFR BLD AUTO: 1.8 % (ref 0–7)
ERYTHROCYTE [DISTWIDTH] IN BLOOD BY AUTOMATED COUNT: 13.8 % (ref 11.5–14.5)
GFR SERPL CREATININE-BSD FRML MDRD: 106 ML/MIN/1.73
GLOBULIN UR ELPH-MCNC: 3.2 GM/DL
GLUCOSE BLD-MCNC: 89 MG/DL (ref 74–98)
GLUCOSE UR STRIP-MCNC: NEGATIVE MG/DL
HCG INTACT+B SERPL-ACNC: 2.44 MIU/ML
HCT VFR BLD AUTO: 36.9 % (ref 37–47)
HGB BLD-MCNC: 11.8 G/DL (ref 12–16)
HGB UR QL STRIP.AUTO: ABNORMAL
HYALINE CASTS UR QL AUTO: ABNORMAL /LPF
IMM GRANULOCYTES # BLD: 0.02 10*3/MM3 (ref 0–0.06)
IMM GRANULOCYTES NFR BLD: 0.3 % (ref 0–0.6)
KETONES UR QL STRIP: NEGATIVE
LEUKOCYTE ESTERASE UR QL STRIP.AUTO: NEGATIVE
LYMPHOCYTES # BLD AUTO: 1.9 10*3/MM3 (ref 0.6–3.4)
LYMPHOCYTES NFR BLD AUTO: 31.5 % (ref 10–50)
MCH RBC QN AUTO: 26.2 PG (ref 27–31)
MCHC RBC AUTO-ENTMCNC: 32 G/DL (ref 30–37)
MCV RBC AUTO: 81.8 FL (ref 81–99)
MONOCYTES # BLD AUTO: 0.43 10*3/MM3 (ref 0–0.9)
MONOCYTES NFR BLD AUTO: 7.1 % (ref 0–12)
NEUTROPHILS # BLD AUTO: 3.54 10*3/MM3 (ref 2–6.9)
NEUTROPHILS NFR BLD AUTO: 58.6 % (ref 37–80)
NITRITE UR QL STRIP: NEGATIVE
NRBC BLD MANUAL-RTO: 0 /100 WBC (ref 0–0)
PH UR STRIP.AUTO: 5.5 [PH] (ref 5–8)
PLATELET # BLD AUTO: 228 10*3/MM3 (ref 130–400)
PMV BLD AUTO: 11.1 FL (ref 6–12)
POTASSIUM BLD-SCNC: 3.9 MMOL/L (ref 3.5–5.1)
PROT SERPL-MCNC: 7.4 G/DL (ref 6.3–8.2)
PROT UR QL STRIP: NEGATIVE
RBC # BLD AUTO: 4.51 10*6/MM3 (ref 4.2–5.4)
RBC # UR: ABNORMAL /HPF
REF LAB TEST METHOD: ABNORMAL
RH BLD: NEGATIVE
SODIUM BLD-SCNC: 139 MMOL/L (ref 137–145)
SP GR UR STRIP: 1.01 (ref 1–1.03)
SQUAMOUS #/AREA URNS HPF: ABNORMAL /HPF
UROBILINOGEN UR QL STRIP: ABNORMAL
WBC NRBC COR # BLD: 6.04 10*3/MM3 (ref 4.8–10.8)
WBC UR QL AUTO: ABNORMAL /HPF

## 2018-07-27 PROCEDURE — 87086 URINE CULTURE/COLONY COUNT: CPT | Performed by: PHYSICIAN ASSISTANT

## 2018-07-27 PROCEDURE — 84702 CHORIONIC GONADOTROPIN TEST: CPT | Performed by: PHYSICIAN ASSISTANT

## 2018-07-27 PROCEDURE — 99283 EMERGENCY DEPT VISIT LOW MDM: CPT

## 2018-07-27 PROCEDURE — 80053 COMPREHEN METABOLIC PANEL: CPT | Performed by: PHYSICIAN ASSISTANT

## 2018-07-27 PROCEDURE — 86900 BLOOD TYPING SEROLOGIC ABO: CPT | Performed by: PHYSICIAN ASSISTANT

## 2018-07-27 PROCEDURE — 86901 BLOOD TYPING SEROLOGIC RH(D): CPT | Performed by: PHYSICIAN ASSISTANT

## 2018-07-27 PROCEDURE — 85025 COMPLETE CBC W/AUTO DIFF WBC: CPT | Performed by: PHYSICIAN ASSISTANT

## 2018-07-27 PROCEDURE — 81001 URINALYSIS AUTO W/SCOPE: CPT | Performed by: PHYSICIAN ASSISTANT

## 2018-07-27 RX ORDER — SODIUM CHLORIDE 0.9 % (FLUSH) 0.9 %
10 SYRINGE (ML) INJECTION AS NEEDED
Status: DISCONTINUED | OUTPATIENT
Start: 2018-07-27 | End: 2018-07-27 | Stop reason: HOSPADM

## 2018-07-29 LAB — BACTERIA SPEC AEROBE CULT: NORMAL

## 2018-09-02 ENCOUNTER — HOSPITAL ENCOUNTER (EMERGENCY)
Facility: HOSPITAL | Age: 23
Discharge: HOME | End: 2018-09-02
Attending: HOSPITALIST
Payer: COMMERCIAL

## 2018-09-02 VITALS
HEART RATE: 87 BPM | OXYGEN SATURATION: 100 % | BODY MASS INDEX: 27.47 KG/M2 | TEMPERATURE: 98 F | SYSTOLIC BLOOD PRESSURE: 116 MMHG | WEIGHT: 175 LBS | HEIGHT: 67 IN | RESPIRATION RATE: 16 BRPM | DIASTOLIC BLOOD PRESSURE: 74 MMHG

## 2018-09-02 DIAGNOSIS — J06.9 ACUTE UPPER RESPIRATORY INFECTION: Primary | ICD-10-CM

## 2018-09-02 DIAGNOSIS — J02.9 ACUTE PHARYNGITIS, UNSPECIFIED ETIOLOGY: ICD-10-CM

## 2018-09-02 LAB — S PYO AG THROAT QL: NEGATIVE

## 2018-09-02 PROCEDURE — 87880 STREP A ASSAY W/OPTIC: CPT

## 2018-09-02 PROCEDURE — 99283 EMERGENCY DEPT VISIT LOW MDM: CPT

## 2018-09-02 ASSESSMENT — PAIN SCALES - GENERAL: PAINLEVEL_OUTOF10: 7

## 2018-09-02 ASSESSMENT — PAIN DESCRIPTION - LOCATION: LOCATION: THROAT

## 2018-09-02 ASSESSMENT — PAIN DESCRIPTION - DESCRIPTORS: DESCRIPTORS: SORE

## 2018-09-02 ASSESSMENT — PAIN DESCRIPTION - PAIN TYPE: TYPE: ACUTE PAIN

## 2018-09-02 NOTE — ED NOTES
Discharge instructions, follow up care reviewed with pt and family. Pt and family verbalized understanding. Pt will be d/c home at this time.       Melvina Bobo RN  09/02/18 6320

## 2018-09-11 ENCOUNTER — HOSPITAL ENCOUNTER (EMERGENCY)
Facility: HOSPITAL | Age: 23
Discharge: HOME | End: 2018-09-11
Attending: EMERGENCY MEDICINE | Admitting: EMERGENCY MEDICINE

## 2018-09-11 ENCOUNTER — APPOINTMENT (OUTPATIENT)
Dept: CT IMAGING | Facility: HOSPITAL | Age: 23
End: 2018-09-11

## 2018-09-11 VITALS
HEIGHT: 67 IN | HEART RATE: 78 BPM | SYSTOLIC BLOOD PRESSURE: 121 MMHG | OXYGEN SATURATION: 100 % | TEMPERATURE: 98.4 F | RESPIRATION RATE: 16 BRPM | WEIGHT: 179.4 LBS | DIASTOLIC BLOOD PRESSURE: 90 MMHG | BODY MASS INDEX: 28.16 KG/M2

## 2018-09-11 DIAGNOSIS — H53.8 BLURRY VISION, LEFT EYE: ICD-10-CM

## 2018-09-11 DIAGNOSIS — N30.01 ACUTE CYSTITIS WITH HEMATURIA: ICD-10-CM

## 2018-09-11 DIAGNOSIS — G43.109 OCULAR MIGRAINE: Primary | ICD-10-CM

## 2018-09-11 LAB
ALBUMIN SERPL-MCNC: 4.5 G/DL (ref 3.5–5)
ALBUMIN/GLOB SERPL: 1.5 G/DL (ref 1–2)
ALP SERPL-CCNC: 85 U/L (ref 38–126)
ALT SERPL W P-5'-P-CCNC: 35 U/L (ref 13–69)
ANION GAP SERPL CALCULATED.3IONS-SCNC: 13.6 MMOL/L (ref 10–20)
AST SERPL-CCNC: 25 U/L (ref 15–46)
B-HCG UR QL: NEGATIVE
BACTERIA UR QL AUTO: ABNORMAL /HPF
BASOPHILS # BLD AUTO: 0.04 10*3/MM3 (ref 0–0.2)
BASOPHILS NFR BLD AUTO: 0.4 % (ref 0–2.5)
BILIRUB SERPL-MCNC: 0.4 MG/DL (ref 0.2–1.3)
BILIRUB UR QL STRIP: NEGATIVE
BUN BLD-MCNC: 10 MG/DL (ref 7–20)
BUN/CREAT SERPL: 12.5 (ref 7.1–23.5)
CALCIUM SPEC-SCNC: 9.1 MG/DL (ref 8.4–10.2)
CHLORIDE SERPL-SCNC: 104 MMOL/L (ref 98–107)
CLARITY UR: CLEAR
CO2 SERPL-SCNC: 24 MMOL/L (ref 26–30)
COLOR UR: YELLOW
CREAT BLD-MCNC: 0.8 MG/DL (ref 0.6–1.3)
DEPRECATED RDW RBC AUTO: 40.9 FL (ref 37–54)
EOSINOPHIL # BLD AUTO: 0.06 10*3/MM3 (ref 0–0.7)
EOSINOPHIL NFR BLD AUTO: 0.7 % (ref 0–7)
ERYTHROCYTE [DISTWIDTH] IN BLOOD BY AUTOMATED COUNT: 13.6 % (ref 11.5–14.5)
GFR SERPL CREATININE-BSD FRML MDRD: 91 ML/MIN/1.73
GLOBULIN UR ELPH-MCNC: 3 GM/DL
GLUCOSE BLD-MCNC: 97 MG/DL (ref 74–98)
GLUCOSE UR STRIP-MCNC: NEGATIVE MG/DL
HCT VFR BLD AUTO: 36.8 % (ref 37–47)
HGB BLD-MCNC: 11.8 G/DL (ref 12–16)
HGB UR QL STRIP.AUTO: ABNORMAL
HOLD SPECIMEN: NORMAL
HOLD SPECIMEN: NORMAL
HYALINE CASTS UR QL AUTO: ABNORMAL /LPF
IMM GRANULOCYTES # BLD: 0.02 10*3/MM3 (ref 0–0.06)
IMM GRANULOCYTES NFR BLD: 0.2 % (ref 0–0.6)
KETONES UR QL STRIP: NEGATIVE
LEUKOCYTE ESTERASE UR QL STRIP.AUTO: ABNORMAL
LYMPHOCYTES # BLD AUTO: 2.25 10*3/MM3 (ref 0.6–3.4)
LYMPHOCYTES NFR BLD AUTO: 24.8 % (ref 10–50)
MCH RBC QN AUTO: 26.5 PG (ref 27–31)
MCHC RBC AUTO-ENTMCNC: 32.1 G/DL (ref 30–37)
MCV RBC AUTO: 82.5 FL (ref 81–99)
MONOCYTES # BLD AUTO: 0.51 10*3/MM3 (ref 0–0.9)
MONOCYTES NFR BLD AUTO: 5.6 % (ref 0–12)
NEUTROPHILS # BLD AUTO: 6.2 10*3/MM3 (ref 2–6.9)
NEUTROPHILS NFR BLD AUTO: 68.3 % (ref 37–80)
NITRITE UR QL STRIP: NEGATIVE
NRBC BLD MANUAL-RTO: 0 /100 WBC (ref 0–0)
PH UR STRIP.AUTO: 6.5 [PH] (ref 5–8)
PLATELET # BLD AUTO: 287 10*3/MM3 (ref 130–400)
PMV BLD AUTO: 11.4 FL (ref 6–12)
POTASSIUM BLD-SCNC: 3.6 MMOL/L (ref 3.5–5.1)
PROT SERPL-MCNC: 7.5 G/DL (ref 6.3–8.2)
PROT UR QL STRIP: NEGATIVE
RBC # BLD AUTO: 4.46 10*6/MM3 (ref 4.2–5.4)
RBC # UR: ABNORMAL /HPF
REF LAB TEST METHOD: ABNORMAL
SODIUM BLD-SCNC: 138 MMOL/L (ref 137–145)
SP GR UR STRIP: 1.02 (ref 1–1.03)
SQUAMOUS #/AREA URNS HPF: ABNORMAL /HPF
UROBILINOGEN UR QL STRIP: ABNORMAL
WBC NRBC COR # BLD: 9.08 10*3/MM3 (ref 4.8–10.8)
WBC UR QL AUTO: ABNORMAL /HPF
WHOLE BLOOD HOLD SPECIMEN: NORMAL

## 2018-09-11 PROCEDURE — 81001 URINALYSIS AUTO W/SCOPE: CPT | Performed by: PHYSICIAN ASSISTANT

## 2018-09-11 PROCEDURE — 81025 URINE PREGNANCY TEST: CPT | Performed by: PHYSICIAN ASSISTANT

## 2018-09-11 PROCEDURE — 70450 CT HEAD/BRAIN W/O DYE: CPT

## 2018-09-11 PROCEDURE — 85025 COMPLETE CBC W/AUTO DIFF WBC: CPT | Performed by: PHYSICIAN ASSISTANT

## 2018-09-11 PROCEDURE — 96360 HYDRATION IV INFUSION INIT: CPT

## 2018-09-11 PROCEDURE — 80053 COMPREHEN METABOLIC PANEL: CPT | Performed by: PHYSICIAN ASSISTANT

## 2018-09-11 PROCEDURE — 99284 EMERGENCY DEPT VISIT MOD MDM: CPT

## 2018-09-11 RX ORDER — AMOXICILLIN AND CLAVULANATE POTASSIUM 875; 125 MG/1; MG/1
1 TABLET, FILM COATED ORAL EVERY 12 HOURS
Qty: 20 TABLET | Refills: 0 | Status: SHIPPED | OUTPATIENT
Start: 2018-09-11 | End: 2019-08-12

## 2018-09-11 RX ORDER — SODIUM CHLORIDE 0.9 % (FLUSH) 0.9 %
10 SYRINGE (ML) INJECTION AS NEEDED
Status: DISCONTINUED | OUTPATIENT
Start: 2018-09-11 | End: 2018-09-12 | Stop reason: HOSPADM

## 2018-09-11 RX ADMIN — SODIUM CHLORIDE 1000 ML: 9 INJECTION, SOLUTION INTRAVENOUS at 21:05

## 2018-09-12 NOTE — ED PROVIDER NOTES
Subjective   This is a 23-year-old female comes in complaining of migraine headache times one day ago.  Patient states she has a history of migraine headaches and reports this felt similar as a headache generalized in nature.  She states she woke up this morning experiencing left eye vision problems.  She states that she has had blurry vision since she woke up.  States that headache has resolved.  Denies any fever, chills, nausea, vomiting, neck stiffness.        History provided by:  Patient   used: No    Headache   Pain location:  Generalized  Quality:  Sharp and stabbing  Severity currently:  0/10  Onset quality:  Gradual  Duration:  1 day  Progression:  Resolved  Chronicity:  Recurrent  Similar to prior headaches: no    Context: not activity, not caffeine, not coughing, not defecating, not eating, not stress and not loud noise    Relieved by:  Nothing  Worsened by:  Nothing  Ineffective treatments:  None tried  Associated symptoms: no abdominal pain, no back pain, no blurred vision, no congestion, no diarrhea, no eye pain, no fever, no focal weakness, no loss of balance, no neck pain, no neck stiffness, no numbness, no paresthesias, no photophobia, no sore throat, no syncope and no tingling    Risk factors: no anger and does not have insomnia        Review of Systems   Constitutional: Negative for fever.   HENT: Negative for congestion and sore throat.    Eyes: Negative for blurred vision, photophobia and pain.   Cardiovascular: Negative for syncope.   Gastrointestinal: Negative for abdominal pain and diarrhea.   Musculoskeletal: Negative for back pain, neck pain and neck stiffness.   Neurological: Positive for headaches. Negative for focal weakness, numbness, paresthesias and loss of balance.   All other systems reviewed and are negative.      Past Medical History:   Diagnosis Date   • Anxiety    • Migraine        No Known Allergies    Past Surgical History:   Procedure Laterality Date   •   SECTION     • DENTAL PROCEDURE     • DIAGNOSTIC LAPAROSCOPY N/A 2017    Procedure: DIAGNOSTIC LAPAROSCOPY;  Surgeon: Dandy Shelby MD;  Location: [facility];  Service:        History reviewed. No pertinent family history.    Social History     Social History   • Marital status:      Social History Main Topics   • Smoking status: Never Smoker   • Alcohol use No   • Drug use: No   • Sexual activity: Defer     Other Topics Concern   • Not on file           Objective   Physical Exam   Constitutional: She is oriented to person, place, and time. She appears well-developed and well-nourished. No distress.   HENT:   Head: Normocephalic.   Right Ear: External ear normal.   Left Ear: External ear normal.   Nose: Nose normal.   Mouth/Throat: Oropharynx is clear and moist. No oropharyngeal exudate.   Eyes: Pupils are equal, round, and reactive to light. Conjunctivae and EOM are normal. Right eye exhibits no discharge. Left eye exhibits no discharge. No scleral icterus.   Neck: Normal range of motion. Neck supple. No JVD present. No tracheal deviation present. No thyromegaly present.   Cardiovascular: Normal rate, regular rhythm, normal heart sounds and intact distal pulses.  Exam reveals no gallop and no friction rub.    No murmur heard.  Pulmonary/Chest: Effort normal and breath sounds normal. No stridor. No respiratory distress. She has no wheezes. She has no rales.   Abdominal: Soft. Bowel sounds are normal. She exhibits no distension and no mass. There is no tenderness. There is no guarding.   Genitourinary: Rectal exam shows guaiac negative stool. No vaginal discharge found.   Musculoskeletal: Normal range of motion. She exhibits no edema, tenderness or deformity.   Lymphadenopathy:     She has no cervical adenopathy.   Neurological: She is alert and oriented to person, place, and time. She displays normal reflexes. No cranial nerve deficit. She exhibits normal muscle tone. Coordination normal.    Skin: Skin is warm. Capillary refill takes less than 2 seconds. No rash noted. She is not diaphoretic. No erythema. No pallor.   Psychiatric: She has a normal mood and affect. Her behavior is normal. Judgment and thought content normal.   Nursing note and vitals reviewed.      Procedures           ED Course  ED Course as of Sep 11 2237   Tue Sep 11, 2018   2157 No acute intracranial abnormality  []   2220 Discussed care with patient. Advised to follow-up with opthalmology. Patient is stable at this time. Will be treated for uti.   []      ED Course User Index  [] Nick Boyd PA-C                  MDM      Final diagnoses:   Ocular migraine   Blurry vision, left eye   Acute cystitis with hematuria            Nick Boyd PA-C  09/11/18 2228       Nick Boyd PA-C  09/11/18 2237

## 2018-11-10 ENCOUNTER — HOSPITAL ENCOUNTER (EMERGENCY)
Facility: HOSPITAL | Age: 23
Discharge: HOME | End: 2018-11-10
Attending: EMERGENCY MEDICINE
Payer: MEDICAID

## 2018-11-10 VITALS
WEIGHT: 180 LBS | OXYGEN SATURATION: 99 % | HEART RATE: 67 BPM | HEIGHT: 66 IN | DIASTOLIC BLOOD PRESSURE: 77 MMHG | SYSTOLIC BLOOD PRESSURE: 117 MMHG | TEMPERATURE: 98.4 F | RESPIRATION RATE: 20 BRPM | BODY MASS INDEX: 28.93 KG/M2

## 2018-11-10 DIAGNOSIS — G43.009 MIGRAINE WITHOUT AURA AND WITHOUT STATUS MIGRAINOSUS, NOT INTRACTABLE: Primary | ICD-10-CM

## 2018-11-10 PROCEDURE — 6360000002 HC RX W HCPCS: Performed by: EMERGENCY MEDICINE

## 2018-11-10 PROCEDURE — 96372 THER/PROPH/DIAG INJ SC/IM: CPT

## 2018-11-10 PROCEDURE — 96374 THER/PROPH/DIAG INJ IV PUSH: CPT

## 2018-11-10 PROCEDURE — 99282 EMERGENCY DEPT VISIT SF MDM: CPT

## 2018-11-10 RX ORDER — MEPERIDINE HYDROCHLORIDE 25 MG/ML
25 INJECTION INTRAMUSCULAR; INTRAVENOUS; SUBCUTANEOUS ONCE
Status: COMPLETED | OUTPATIENT
Start: 2018-11-10 | End: 2018-11-10

## 2018-11-10 RX ORDER — MEPERIDINE HYDROCHLORIDE 25 MG/ML
25 INJECTION INTRAMUSCULAR; INTRAVENOUS; SUBCUTANEOUS ONCE
Status: DISCONTINUED | OUTPATIENT
Start: 2018-11-10 | End: 2018-11-10

## 2018-11-10 RX ORDER — BUTALBITAL, ACETAMINOPHEN AND CAFFEINE 50; 325; 40 MG/1; MG/1; MG/1
1 TABLET ORAL EVERY 4 HOURS PRN
Qty: 12 TABLET | Refills: 0 | Status: SHIPPED | OUTPATIENT
Start: 2018-11-10 | End: 2018-11-21

## 2018-11-10 RX ORDER — ONDANSETRON 4 MG/1
4 TABLET, FILM COATED ORAL DAILY PRN
Qty: 12 TABLET | Refills: 0 | Status: SHIPPED | OUTPATIENT
Start: 2018-11-10 | End: 2018-11-21

## 2018-11-10 RX ORDER — PROMETHAZINE HYDROCHLORIDE 25 MG/ML
12.5 INJECTION, SOLUTION INTRAMUSCULAR; INTRAVENOUS ONCE
Status: COMPLETED | OUTPATIENT
Start: 2018-11-10 | End: 2018-11-10

## 2018-11-10 RX ORDER — SERTRALINE HYDROCHLORIDE 25 MG/1
25 TABLET, FILM COATED ORAL DAILY
COMMUNITY
End: 2018-11-21 | Stop reason: SINTOL

## 2018-11-10 RX ADMIN — MEPERIDINE HYDROCHLORIDE 25 MG: 25 INJECTION INTRAMUSCULAR; INTRAVENOUS; SUBCUTANEOUS at 15:05

## 2018-11-10 RX ADMIN — PROMETHAZINE HYDROCHLORIDE 12.5 MG: 25 INJECTION INTRAMUSCULAR; INTRAVENOUS at 15:05

## 2018-11-10 ASSESSMENT — ENCOUNTER SYMPTOMS
SHORTNESS OF BREATH: 0
TROUBLE SWALLOWING: 0
EYE REDNESS: 0
BACK PAIN: 0
SORE THROAT: 0
COUGH: 0
SINUS PRESSURE: 0
DIARRHEA: 0
EYE PAIN: 0
RHINORRHEA: 0
ABDOMINAL PAIN: 0
WHEEZING: 0
CHEST TIGHTNESS: 0
NAUSEA: 1
CONSTIPATION: 0
EYE DISCHARGE: 0
VOMITING: 0

## 2018-11-10 ASSESSMENT — PAIN SCALES - GENERAL
PAINLEVEL_OUTOF10: 8

## 2018-11-10 ASSESSMENT — PAIN DESCRIPTION - DESCRIPTORS: DESCRIPTORS: ACHING

## 2018-11-10 ASSESSMENT — PAIN DESCRIPTION - LOCATION: LOCATION: HEAD

## 2018-11-10 ASSESSMENT — PAIN DESCRIPTION - PAIN TYPE: TYPE: ACUTE PAIN

## 2018-11-10 NOTE — ED PROVIDER NOTES
Laterality Date    ABDOMEN SURGERY      cleaned out drainage from an ovarian cyst rupture     SECTION      DENTAL SURGERY           CURRENT MEDICATIONS       Previous Medications    No medications on file       ALLERGIES     Patient has no known allergies. FAMILY HISTORY     No family history on file. SOCIAL HISTORY       Social History     Social History    Marital status:      Spouse name: N/A    Number of children: N/A    Years of education: N/A     Social History Main Topics    Smoking status: Never Smoker    Smokeless tobacco: Never Used    Alcohol use No    Drug use: No    Sexual activity: Not on file     Other Topics Concern    Not on file     Social History Narrative    No narrative on file       SCREENINGS             PHYSICAL EXAM    (up to 7 for level 4, 8 or more for level 5)     ED Triage Vitals [11/10/18 1450]   BP Temp Temp src Pulse Resp SpO2 Height Weight   128/78 98.4 °F (36.9 °C) -- 66 20 98 % 5' 6\" (1.676 m) 180 lb (81.6 kg)       Physical Exam   Constitutional: She is oriented to person, place, and time. She appears well-developed and well-nourished. HENT:   Head: Normocephalic and atraumatic. Right Ear: External ear normal.   Left Ear: External ear normal.   Mouth/Throat: Oropharynx is clear and moist.   Eyes: Pupils are equal, round, and reactive to light. Conjunctivae and EOM are normal.   Neck: Neck supple. Cardiovascular: Normal rate and regular rhythm. Pulmonary/Chest: Effort normal and breath sounds normal. No respiratory distress. Abdominal: Soft. Bowel sounds are normal. She exhibits no distension. There is no tenderness. There is no guarding. Musculoskeletal: Normal range of motion. Neurological: She is alert and oriented to person, place, and time. No cranial nerve deficit or sensory deficit. She exhibits normal muscle tone. Coordination normal.   Skin: Skin is warm and dry. Psychiatric: She has a normal mood and affect. DIAGNOSTIC RESULTS     EKG: All EKG's are interpreted by the Emergency Department Physician who either signs or Co-signs this chart in the absence of a cardiologist.        RADIOLOGY:   Non-plain film images such as CT, Ultrasound and MRI are read by the radiologist. Plain radiographic images are visualized andpreliminarily interpreted by the emergency physician with the below findings:        Interpretationper the Radiologist below, if available at the time of this note:    No orders to display         ED BEDSIDE ULTRASOUND:   Performed by ED Physician - none    LABS:  Labs Reviewed - No data to display    All other labs were within normal range or not returned as of this dictation. EMERGENCY DEPARTMENT COURSE and DIFFERENTIAL DIAGNOSIS/MDM:   Vitals:    Vitals:    11/10/18 1450   BP: 128/78   Pulse: 66   Resp: 20   Temp: 98.4 °F (36.9 °C)   SpO2: 98%   Weight: 180 lb (81.6 kg)   Height: 5' 6\" (1.676 m)           CRITICAL CARE TIME   Total Critical Care time was  minutes, excluding separatelyreportable procedures. There was a high probability ofclinically significant/life threatening deterioration in the patient's condition which required my urgent intervention. CONSULTS:  None    PROCEDURES:  None    PROGRESS NOTES:    Will give demerol/phenergan and start on fioricet and zofran. FINAL IMPRESSION      1.  Migraine without aura and without status migrainosus, not intractable          Final diagnoses:   Migraine without aura and without status migrainosus, not intractable       DISPOSITION/PLAN   DISPOSITION Decision To Discharge 11/10/2018 02:52:54 PM      PATIENT REFERRED TO:  JESSICA Johnson [facility]  [phone]      If symptoms worsen      DISCHARGE MEDICATIONS:  New Prescriptions    BUTALBITAL-ACETAMINOPHEN-CAFFEINE (FIORICET, ESGIC) -40 MG PER TABLET    Take 1 tablet by mouth every 4 hours as needed for Headaches    ONDANSETRON (ZOFRAN) 4 MG TABLET

## 2018-11-21 ENCOUNTER — HOSPITAL ENCOUNTER (EMERGENCY)
Facility: HOSPITAL | Age: 23
Discharge: HOME | End: 2018-11-21
Attending: HOSPITALIST
Payer: MEDICAID

## 2018-11-21 VITALS
HEART RATE: 88 BPM | WEIGHT: 180 LBS | RESPIRATION RATE: 16 BRPM | SYSTOLIC BLOOD PRESSURE: 116 MMHG | OXYGEN SATURATION: 98 % | HEIGHT: 67 IN | DIASTOLIC BLOOD PRESSURE: 87 MMHG | BODY MASS INDEX: 28.25 KG/M2 | TEMPERATURE: 98.7 F

## 2018-11-21 DIAGNOSIS — N30.01 ACUTE CYSTITIS WITH HEMATURIA: Primary | ICD-10-CM

## 2018-11-21 LAB
BACTERIA: ABNORMAL /HPF
BILIRUBIN URINE: NEGATIVE
BLOOD, URINE: ABNORMAL
CLARITY: CLEAR
COLOR: YELLOW
EPITHELIAL CELLS, UA: ABNORMAL /HPF
GLUCOSE URINE: NEGATIVE MG/DL
HCG(URINE) PREGNANCY TEST: NEGATIVE
KETONES, URINE: NEGATIVE MG/DL
LEUKOCYTE ESTERASE, URINE: ABNORMAL
MICROSCOPIC EXAMINATION: YES
NITRITE, URINE: NEGATIVE
PH UA: 7
PROTEIN UA: NEGATIVE MG/DL
RBC UA: ABNORMAL /HPF (ref 0–2)
SPECIFIC GRAVITY UA: 1.01
URINE REFLEX TO CULTURE: YES
URINE TYPE: ABNORMAL
UROBILINOGEN, URINE: 0.2 E.U./DL
WBC UA: ABNORMAL /HPF (ref 0–5)

## 2018-11-21 PROCEDURE — 84703 CHORIONIC GONADOTROPIN ASSAY: CPT

## 2018-11-21 PROCEDURE — 87186 SC STD MICRODIL/AGAR DIL: CPT

## 2018-11-21 PROCEDURE — 6370000000 HC RX 637 (ALT 250 FOR IP): Performed by: HOSPITALIST

## 2018-11-21 PROCEDURE — 87077 CULTURE AEROBIC IDENTIFY: CPT

## 2018-11-21 PROCEDURE — 99283 EMERGENCY DEPT VISIT LOW MDM: CPT

## 2018-11-21 PROCEDURE — 96372 THER/PROPH/DIAG INJ SC/IM: CPT

## 2018-11-21 PROCEDURE — 81001 URINALYSIS AUTO W/SCOPE: CPT

## 2018-11-21 PROCEDURE — 87086 URINE CULTURE/COLONY COUNT: CPT

## 2018-11-21 PROCEDURE — 6360000002 HC RX W HCPCS: Performed by: HOSPITALIST

## 2018-11-21 RX ORDER — PHENAZOPYRIDINE HYDROCHLORIDE 200 MG/1
200 TABLET, FILM COATED ORAL 3 TIMES DAILY PRN
Qty: 9 TABLET | Refills: 0 | Status: SHIPPED | OUTPATIENT
Start: 2018-11-21 | End: 2018-11-24

## 2018-11-21 RX ORDER — SULFAMETHOXAZOLE AND TRIMETHOPRIM 800; 160 MG/1; MG/1
1 TABLET ORAL ONCE
Status: COMPLETED | OUTPATIENT
Start: 2018-11-21 | End: 2018-11-21

## 2018-11-21 RX ORDER — KETOROLAC TROMETHAMINE 30 MG/ML
30 INJECTION, SOLUTION INTRAMUSCULAR; INTRAVENOUS ONCE
Status: COMPLETED | OUTPATIENT
Start: 2018-11-21 | End: 2018-11-21

## 2018-11-21 RX ORDER — SULFAMETHOXAZOLE AND TRIMETHOPRIM 800; 160 MG/1; MG/1
2 TABLET ORAL 2 TIMES DAILY
Qty: 40 TABLET | Refills: 0 | Status: SHIPPED | OUTPATIENT
Start: 2018-11-21 | End: 2018-11-28

## 2018-11-21 RX ORDER — PHENAZOPYRIDINE HYDROCHLORIDE 95 MG/1
200 TABLET ORAL ONCE
Status: COMPLETED | OUTPATIENT
Start: 2018-11-21 | End: 2018-11-21

## 2018-11-21 RX ORDER — ONDANSETRON 4 MG/1
4 TABLET, ORALLY DISINTEGRATING ORAL EVERY 6 HOURS PRN
Qty: 15 TABLET | Refills: 0 | Status: SHIPPED | OUTPATIENT
Start: 2018-11-21 | End: 2018-11-28

## 2018-11-21 RX ORDER — IBUPROFEN 800 MG/1
800 TABLET ORAL EVERY 6 HOURS PRN
Qty: 20 TABLET | Refills: 0 | Status: SHIPPED | OUTPATIENT
Start: 2018-11-21 | End: 2018-11-28

## 2018-11-21 RX ADMIN — KETOROLAC TROMETHAMINE 30 MG: 30 INJECTION, SOLUTION INTRAMUSCULAR at 20:01

## 2018-11-21 RX ADMIN — Medication 190 MG: at 20:52

## 2018-11-21 RX ADMIN — SULFAMETHOXAZOLE AND TRIMETHOPRIM 1 TABLET: 800; 160 TABLET ORAL at 20:52

## 2018-11-21 ASSESSMENT — PAIN DESCRIPTION - ONSET: ONSET: GRADUAL

## 2018-11-21 ASSESSMENT — PAIN DESCRIPTION - FREQUENCY: FREQUENCY: CONTINUOUS

## 2018-11-21 ASSESSMENT — PAIN DESCRIPTION - PAIN TYPE: TYPE: ACUTE PAIN

## 2018-11-21 ASSESSMENT — PAIN DESCRIPTION - PROGRESSION: CLINICAL_PROGRESSION: GRADUALLY WORSENING

## 2018-11-21 ASSESSMENT — PAIN DESCRIPTION - ORIENTATION: ORIENTATION: LOWER

## 2018-11-21 ASSESSMENT — PAIN DESCRIPTION - LOCATION: LOCATION: ABDOMEN

## 2018-11-21 ASSESSMENT — PAIN SCALES - GENERAL: PAINLEVEL_OUTOF10: 8

## 2018-11-21 ASSESSMENT — PAIN DESCRIPTION - DESCRIPTORS: DESCRIPTORS: SHARP;PRESSURE

## 2018-11-24 LAB
ORGANISM: ABNORMAL
URINE CULTURE, ROUTINE: ABNORMAL
URINE CULTURE, ROUTINE: ABNORMAL

## 2018-11-28 ENCOUNTER — HOSPITAL ENCOUNTER (EMERGENCY)
Facility: HOSPITAL | Age: 23
Discharge: HOME | End: 2018-11-28
Attending: FAMILY MEDICINE
Payer: MEDICAID

## 2018-11-28 VITALS
HEIGHT: 67 IN | BODY MASS INDEX: 28.25 KG/M2 | DIASTOLIC BLOOD PRESSURE: 69 MMHG | SYSTOLIC BLOOD PRESSURE: 120 MMHG | HEART RATE: 96 BPM | OXYGEN SATURATION: 100 % | TEMPERATURE: 97.8 F | WEIGHT: 180 LBS

## 2018-11-28 DIAGNOSIS — N83.209 OVARIAN CYST RUPTURE: Primary | ICD-10-CM

## 2018-11-28 LAB
AMPHETAMINE SCREEN, URINE: NORMAL
BARBITURATE SCREEN URINE: NORMAL
BENZODIAZEPINE SCREEN, URINE: NORMAL
BILIRUBIN URINE: NEGATIVE
BLOOD, URINE: NEGATIVE
CANNABINOID SCREEN URINE: NORMAL
CLARITY: CLEAR
COCAINE METABOLITE SCREEN URINE: NORMAL
COLOR: YELLOW
GLUCOSE URINE: NEGATIVE MG/DL
KETONES, URINE: NEGATIVE MG/DL
LEUKOCYTE ESTERASE, URINE: NEGATIVE
Lab: NORMAL
METHADONE SCREEN, URINE: NORMAL
METHAMPHETAMINE, URINE: NORMAL
MICROSCOPIC EXAMINATION: NORMAL
NITRITE, URINE: NEGATIVE
OPIATE SCREEN URINE: NORMAL
PH UA: 5.5
PHENCYCLIDINE SCREEN URINE: NORMAL
PROPOXYPHENE SCREEN, URINE: NORMAL
PROTEIN UA: NEGATIVE MG/DL
SPECIFIC GRAVITY UA: 1.01
TRICYCLIC, URINE: NORMAL
UR OXYCODONE RAPID SCREEN: NORMAL
URINE REFLEX TO CULTURE: NORMAL
URINE TYPE: NORMAL
UROBILINOGEN, URINE: 0.2 E.U./DL

## 2018-11-28 PROCEDURE — 81003 URINALYSIS AUTO W/O SCOPE: CPT

## 2018-11-28 PROCEDURE — 80307 DRUG TEST PRSMV CHEM ANLYZR: CPT

## 2018-11-28 PROCEDURE — 99283 EMERGENCY DEPT VISIT LOW MDM: CPT

## 2018-11-28 RX ORDER — HYDROCODONE BITARTRATE AND ACETAMINOPHEN 7.5; 325 MG/1; MG/1
1 TABLET ORAL EVERY 6 HOURS PRN
Qty: 8 TABLET | Refills: 0 | Status: SHIPPED | OUTPATIENT
Start: 2018-11-28 | End: 2018-12-01

## 2018-11-28 ASSESSMENT — PAIN SCALES - GENERAL
PAINLEVEL_OUTOF10: 6
PAINLEVEL_OUTOF10: 7

## 2018-11-28 ASSESSMENT — ENCOUNTER SYMPTOMS
NAUSEA: 0
VOMITING: 0

## 2018-11-28 ASSESSMENT — PAIN DESCRIPTION - ORIENTATION: ORIENTATION: LEFT

## 2018-11-28 ASSESSMENT — PAIN DESCRIPTION - DESCRIPTORS: DESCRIPTORS: ACHING

## 2018-11-28 ASSESSMENT — PAIN DESCRIPTION - PAIN TYPE: TYPE: ACUTE PAIN

## 2018-11-28 ASSESSMENT — PAIN DESCRIPTION - LOCATION: LOCATION: ABDOMEN;BACK

## 2018-11-28 NOTE — ED PROVIDER NOTES
Zoloft [sertraline]    FAMILY HISTORY     History reviewed. No pertinent family history. SOCIAL HISTORY       Social History     Social History    Marital status:      Spouse name: N/A    Number of children: N/A    Years of education: N/A     Social History Main Topics    Smoking status: Never Smoker    Smokeless tobacco: Never Used    Alcohol use No    Drug use: No    Sexual activity: Yes     Other Topics Concern    None     Social History Narrative    None       SCREENINGS             PHYSICAL EXAM    (up to 7 for level 4, 8 or more for level 5)     ED Triage Vitals [11/28/18 1643]   BP Temp Temp Source Pulse Resp SpO2 Height Weight   120/69 97.8 °F (36.6 °C) Oral 96 -- 100 % 5' 7\" (1.702 m) 180 lb (81.6 kg)       Physical Exam   Constitutional: She is oriented to person, place, and time. She appears well-developed and well-nourished. Neck: Normal range of motion. Neck supple. Cardiovascular: Normal rate. Pulmonary/Chest: Effort normal.   Abdominal: Soft. There is tenderness (over the left lower quadrant without rebound or guarding. Bowel sounds are normal.). Neurological: She is alert and oriented to person, place, and time. Nursing note and vitals reviewed.       DIAGNOSTIC RESULTS     EKG: All EKG's are interpreted by the Emergency Department Physician who either signs or Co-signs this chart in the absence of a cardiologist.        RADIOLOGY:   Non-plain film images such as CT, Ultrasound and MRI are read by the radiologist. Plainradiographic images are visualized and preliminarily interpreted by the emergency physician with the below findings:        Interpretation per the Radiologist below, if available at the time of this note:    No orders to display         ED BEDSIDE ULTRASOUND:   Performed by ED Physician - none    LABS:  Labs Reviewed   URINE RT REFLEX TO CULTURE    Narrative:     Performed at:  [address] [facility]  [place] [address]

## 2018-12-18 ENCOUNTER — HOSPITAL ENCOUNTER (OUTPATIENT)
Dept: ULTRASOUND IMAGING | Facility: HOSPITAL | Age: 23
Discharge: HOME | End: 2018-12-18
Payer: COMMERCIAL

## 2018-12-18 ENCOUNTER — HOSPITAL ENCOUNTER (OUTPATIENT)
Facility: HOSPITAL | Age: 23
Discharge: HOME | End: 2018-12-18
Payer: COMMERCIAL

## 2018-12-18 DIAGNOSIS — R10.31 ABDOMINAL PAIN, RIGHT LOWER QUADRANT: ICD-10-CM

## 2018-12-18 PROCEDURE — 76700 US EXAM ABDOM COMPLETE: CPT

## 2018-12-18 PROCEDURE — 76856 US EXAM PELVIC COMPLETE: CPT

## 2019-02-04 ENCOUNTER — HOSPITAL ENCOUNTER (EMERGENCY)
Facility: HOSPITAL | Age: 24
Discharge: HOME | End: 2019-02-04
Attending: FAMILY MEDICINE
Payer: COMMERCIAL

## 2019-02-04 ENCOUNTER — APPOINTMENT (OUTPATIENT)
Dept: GENERAL RADIOLOGY | Facility: HOSPITAL | Age: 24
End: 2019-02-04
Payer: COMMERCIAL

## 2019-02-04 VITALS
OXYGEN SATURATION: 98 % | TEMPERATURE: 98.2 F | DIASTOLIC BLOOD PRESSURE: 61 MMHG | SYSTOLIC BLOOD PRESSURE: 114 MMHG | RESPIRATION RATE: 16 BRPM | HEIGHT: 67 IN | HEART RATE: 110 BPM | WEIGHT: 180 LBS | BODY MASS INDEX: 28.25 KG/M2

## 2019-02-04 DIAGNOSIS — S60.221A CONTUSION OF RIGHT HAND, INITIAL ENCOUNTER: Primary | ICD-10-CM

## 2019-02-04 PROCEDURE — 99283 EMERGENCY DEPT VISIT LOW MDM: CPT

## 2019-02-04 PROCEDURE — 73130 X-RAY EXAM OF HAND: CPT

## 2019-02-04 ASSESSMENT — PAIN DESCRIPTION - LOCATION: LOCATION: ARM

## 2019-02-04 ASSESSMENT — PAIN DESCRIPTION - ORIENTATION: ORIENTATION: RIGHT

## 2019-02-04 ASSESSMENT — PAIN DESCRIPTION - PAIN TYPE: TYPE: ACUTE PAIN

## 2019-02-04 ASSESSMENT — PAIN SCALES - GENERAL: PAINLEVEL_OUTOF10: 5

## 2019-02-04 ASSESSMENT — PAIN DESCRIPTION - DESCRIPTORS: DESCRIPTORS: ACHING;NUMBNESS

## 2019-02-12 ENCOUNTER — HOSPITAL ENCOUNTER (EMERGENCY)
Facility: HOSPITAL | Age: 24
Discharge: HOME | End: 2019-02-12
Attending: EMERGENCY MEDICINE
Payer: COMMERCIAL

## 2019-02-12 VITALS
OXYGEN SATURATION: 98 % | HEART RATE: 82 BPM | SYSTOLIC BLOOD PRESSURE: 129 MMHG | BODY MASS INDEX: 28.19 KG/M2 | RESPIRATION RATE: 18 BRPM | HEIGHT: 68 IN | WEIGHT: 186 LBS | TEMPERATURE: 98.2 F | DIASTOLIC BLOOD PRESSURE: 79 MMHG

## 2019-02-12 DIAGNOSIS — J01.90 ACUTE NON-RECURRENT SINUSITIS, UNSPECIFIED LOCATION: Primary | ICD-10-CM

## 2019-02-12 LAB
RAPID INFLUENZA  B AGN: NEGATIVE
RAPID INFLUENZA A AGN: NEGATIVE

## 2019-02-12 PROCEDURE — 87804 INFLUENZA ASSAY W/OPTIC: CPT

## 2019-02-12 PROCEDURE — 99283 EMERGENCY DEPT VISIT LOW MDM: CPT

## 2019-02-12 RX ORDER — LORATADINE 10 MG/1
10 TABLET ORAL DAILY
Qty: 12 TABLET | Refills: 0 | Status: SHIPPED | OUTPATIENT
Start: 2019-02-12 | End: 2019-02-24

## 2019-02-12 RX ORDER — AZITHROMYCIN 500 MG/1
500 TABLET, FILM COATED ORAL DAILY
Qty: 6 TABLET | Refills: 0 | Status: SHIPPED | OUTPATIENT
Start: 2019-02-12 | End: 2019-02-15

## 2019-02-12 ASSESSMENT — ENCOUNTER SYMPTOMS
SORE THROAT: 0
CONSTIPATION: 0
CHEST TIGHTNESS: 0
BACK PAIN: 0
TROUBLE SWALLOWING: 0
WHEEZING: 0
DIARRHEA: 0
ABDOMINAL PAIN: 0
SINUS PRESSURE: 0
EYE DISCHARGE: 0
RHINORRHEA: 1
COUGH: 0
SHORTNESS OF BREATH: 0
EYE REDNESS: 0
VOMITING: 0
NAUSEA: 0
EYE PAIN: 0

## 2019-03-12 ENCOUNTER — HOSPITAL ENCOUNTER (EMERGENCY)
Facility: HOSPITAL | Age: 24
Discharge: HOME | End: 2019-03-12
Attending: EMERGENCY MEDICINE
Payer: COMMERCIAL

## 2019-03-12 VITALS
HEART RATE: 58 BPM | RESPIRATION RATE: 18 BRPM | DIASTOLIC BLOOD PRESSURE: 66 MMHG | WEIGHT: 109 LBS | BODY MASS INDEX: 16.52 KG/M2 | SYSTOLIC BLOOD PRESSURE: 117 MMHG | TEMPERATURE: 97.7 F | OXYGEN SATURATION: 98 % | HEIGHT: 68 IN

## 2019-03-12 DIAGNOSIS — T78.40XA ALLERGIC REACTION, INITIAL ENCOUNTER: Primary | ICD-10-CM

## 2019-03-12 PROCEDURE — 96374 THER/PROPH/DIAG INJ IV PUSH: CPT

## 2019-03-12 PROCEDURE — 99283 EMERGENCY DEPT VISIT LOW MDM: CPT

## 2019-03-12 PROCEDURE — 6370000000 HC RX 637 (ALT 250 FOR IP)

## 2019-03-12 PROCEDURE — 6360000002 HC RX W HCPCS: Performed by: EMERGENCY MEDICINE

## 2019-03-12 RX ORDER — ESCITALOPRAM OXALATE 20 MG/1
20 TABLET ORAL DAILY
COMMUNITY
End: 2019-07-26

## 2019-03-12 RX ORDER — DIPHENHYDRAMINE HCL 25 MG
25 TABLET ORAL ONCE
Status: DISCONTINUED | OUTPATIENT
Start: 2019-03-12 | End: 2019-03-12

## 2019-03-12 RX ORDER — DIPHENHYDRAMINE HCL 25 MG
25 CAPSULE ORAL EVERY 6 HOURS PRN
Status: DISCONTINUED | OUTPATIENT
Start: 2019-03-12 | End: 2019-03-12 | Stop reason: HOSPADM

## 2019-03-12 RX ORDER — METHYLPREDNISOLONE SODIUM SUCCINATE 125 MG/2ML
125 INJECTION, POWDER, LYOPHILIZED, FOR SOLUTION INTRAMUSCULAR; INTRAVENOUS ONCE
Status: COMPLETED | OUTPATIENT
Start: 2019-03-12 | End: 2019-03-12

## 2019-03-12 RX ORDER — DIPHENHYDRAMINE HCL 25 MG
CAPSULE ORAL
Status: COMPLETED
Start: 2019-03-12 | End: 2019-03-12

## 2019-03-12 RX ORDER — CLONIDINE HYDROCHLORIDE 0.1 MG/1
0.1 TABLET ORAL 3 TIMES DAILY
COMMUNITY
End: 2019-04-03

## 2019-03-12 RX ADMIN — Medication 25 MG: at 13:55

## 2019-03-12 RX ADMIN — METHYLPREDNISOLONE SODIUM SUCCINATE 125 MG: 125 INJECTION, POWDER, FOR SOLUTION INTRAMUSCULAR; INTRAVENOUS at 13:56

## 2019-03-12 RX ADMIN — DIPHENHYDRAMINE HYDROCHLORIDE 25 MG: 25 CAPSULE ORAL at 13:55

## 2019-03-12 ASSESSMENT — PAIN DESCRIPTION - DESCRIPTORS: DESCRIPTORS: SORE

## 2019-03-12 ASSESSMENT — ENCOUNTER SYMPTOMS
EYE DISCHARGE: 0
WHEEZING: 0
DIARRHEA: 0
RHINORRHEA: 0
BACK PAIN: 0
COUGH: 0
EYE REDNESS: 0
SHORTNESS OF BREATH: 1
CHEST TIGHTNESS: 0
NAUSEA: 0
CONSTIPATION: 0
TROUBLE SWALLOWING: 0
SORE THROAT: 0
VOMITING: 0
EYE PAIN: 0
ABDOMINAL PAIN: 0
SINUS PRESSURE: 0

## 2019-03-12 ASSESSMENT — PAIN DESCRIPTION - LOCATION: LOCATION: THROAT

## 2019-03-12 ASSESSMENT — PAIN DESCRIPTION - FREQUENCY: FREQUENCY: CONTINUOUS

## 2019-03-12 ASSESSMENT — PAIN DESCRIPTION - PAIN TYPE: TYPE: ACUTE PAIN

## 2019-03-12 ASSESSMENT — PAIN SCALES - GENERAL: PAINLEVEL_OUTOF10: 8

## 2019-04-02 ENCOUNTER — HOSPITAL ENCOUNTER (EMERGENCY)
Facility: HOSPITAL | Age: 24
Discharge: HOME | End: 2019-04-03
Attending: EMERGENCY MEDICINE | Admitting: EMERGENCY MEDICINE

## 2019-04-02 ENCOUNTER — APPOINTMENT (OUTPATIENT)
Dept: GENERAL RADIOLOGY | Facility: HOSPITAL | Age: 24
End: 2019-04-02

## 2019-04-02 VITALS
OXYGEN SATURATION: 98 % | DIASTOLIC BLOOD PRESSURE: 88 MMHG | RESPIRATION RATE: 20 BRPM | WEIGHT: 185 LBS | TEMPERATURE: 98.1 F | SYSTOLIC BLOOD PRESSURE: 128 MMHG | HEART RATE: 90 BPM | BODY MASS INDEX: 29.03 KG/M2 | HEIGHT: 67 IN

## 2019-04-02 DIAGNOSIS — S93.409A SPRAIN OF ANKLE, UNSPECIFIED LATERALITY, UNSPECIFIED LIGAMENT, INITIAL ENCOUNTER: Primary | ICD-10-CM

## 2019-04-02 PROCEDURE — 99283 EMERGENCY DEPT VISIT LOW MDM: CPT

## 2019-04-02 PROCEDURE — 73610 X-RAY EXAM OF ANKLE: CPT

## 2019-04-02 RX ORDER — RANITIDINE 150 MG/1
150 TABLET ORAL 2 TIMES DAILY
COMMUNITY
End: 2019-08-12

## 2019-04-02 RX ORDER — HYDROXYZINE PAMOATE 25 MG/1
25 CAPSULE ORAL 3 TIMES DAILY PRN
COMMUNITY
End: 2019-08-12

## 2019-04-03 ENCOUNTER — APPOINTMENT (OUTPATIENT)
Dept: GENERAL RADIOLOGY | Facility: HOSPITAL | Age: 24
End: 2019-04-03

## 2019-04-03 ENCOUNTER — APPOINTMENT (OUTPATIENT)
Dept: GENERAL RADIOLOGY | Facility: HOSPITAL | Age: 24
End: 2019-04-03
Payer: COMMERCIAL

## 2019-04-03 ENCOUNTER — HOSPITAL ENCOUNTER (EMERGENCY)
Facility: HOSPITAL | Age: 24
Discharge: HOME | End: 2019-04-03
Attending: FAMILY MEDICINE
Payer: COMMERCIAL

## 2019-04-03 VITALS
TEMPERATURE: 98.5 F | HEART RATE: 111 BPM | DIASTOLIC BLOOD PRESSURE: 83 MMHG | RESPIRATION RATE: 18 BRPM | OXYGEN SATURATION: 98 % | BODY MASS INDEX: 29.03 KG/M2 | WEIGHT: 185 LBS | HEIGHT: 67 IN | SYSTOLIC BLOOD PRESSURE: 114 MMHG

## 2019-04-03 DIAGNOSIS — S93.402A SPRAIN OF LEFT ANKLE, UNSPECIFIED LIGAMENT, INITIAL ENCOUNTER: Primary | ICD-10-CM

## 2019-04-03 PROCEDURE — 99283 EMERGENCY DEPT VISIT LOW MDM: CPT

## 2019-04-03 PROCEDURE — 73630 X-RAY EXAM OF FOOT: CPT

## 2019-04-03 PROCEDURE — 73590 X-RAY EXAM OF LOWER LEG: CPT

## 2019-04-03 RX ORDER — HYDROCODONE BITARTRATE AND ACETAMINOPHEN 7.5; 325 MG/1; MG/1
1 TABLET ORAL EVERY 6 HOURS PRN
Qty: 8 TABLET | Refills: 0 | Status: SHIPPED | OUTPATIENT
Start: 2019-04-03 | End: 2019-04-06

## 2019-04-03 ASSESSMENT — PAIN SCALES - GENERAL: PAINLEVEL_OUTOF10: 8

## 2019-04-03 ASSESSMENT — PAIN DESCRIPTION - ORIENTATION: ORIENTATION: LEFT

## 2019-04-03 ASSESSMENT — PAIN DESCRIPTION - DESCRIPTORS: DESCRIPTORS: ACHING

## 2019-04-03 ASSESSMENT — PAIN DESCRIPTION - PAIN TYPE: TYPE: ACUTE PAIN

## 2019-04-03 ASSESSMENT — PAIN DESCRIPTION - LOCATION: LOCATION: ANKLE;FOOT

## 2019-04-03 NOTE — ED PROVIDER NOTES
[address] [place] [address] Court  eMERGENCY dEPARTMENT eNCOUnter      Pt Name: Reilly Young  MRN: [id number]  [place] 1995  Date of evaluation: 4/3/2019  Provider: Amy Brothers MD    [address] [place] [address]       Chief Complaint   Patient presents with    Ankle Pain    Foot Pain         HISTORY OF PRESENT ILLNESS   (Location/Symptom, Timing/Onset, Context/Setting, Quality, Duration, Modifying Factors, Severity)  Note limiting factors. Reilly Young is a 26 y.o. female who presents to the emergency department complaining about left ankle pain, left foot pain, and left leg pain. Patient states that she twisted her ankle walking down some stairs last night. She went to [facility] in [place] and states that she was x-rayed but did not see a doctor and that a nurse came in and wrapped her ankle and told her she had a sprain and sent her home. Nursing Notes were reviewed. REVIEW OF SYSTEMS    (2-9 systems for level 4, 10 or more forlevel 5)     Review of Systems   Musculoskeletal: Positive for arthralgias, gait problem and joint swelling. Except as noted above the remainder of the review of systems was reviewed and negative. PAST MEDICAL HISTORY     Past Medical History:   Diagnosis Date    Anxiety     Depression     Fibromyalgia     Migraine     Miscarriage     Ovarian cyst 2017    ruptured         SURGICAL HISTORY       Past Surgical History:   Procedure Laterality Date    ABDOMEN SURGERY      cleaned out drainage from an ovarian cyst rupture     SECTION      DENTAL SURGERY           CURRENT MEDICATIONS       Previous Medications    ESCITALOPRAM (LEXAPRO) 20 MG TABLET    Take 20 mg by mouth daily    HYDROXYZINE PAMOATE (VISTARIL PO)    Take by mouth    NONFORMULARY    Indications: unknown name unknown dose anxiety or depression med        ALLERGIES     Zoloft [sertraline]    FAMILY HISTORY     History reviewed. No pertinent family history. SOCIAL HISTORY       Social History     Socioeconomic History    Marital status:      Spouse name: None    Number of children: None    Years of education: None    Highest education level: None   Occupational History    None   Social Needs    Financial resource strain: None    Food insecurity:     Worry: None     Inability: None    Transportation needs:     Medical: None     Non-medical: None   Tobacco Use    Smoking status: Never Smoker    Smokeless tobacco: Never Used   Substance and Sexual Activity    Alcohol use: No     Comment: rare    Drug use: No    Sexual activity: Yes   Lifestyle    Physical activity:     Days per week: None     Minutes per session: None    Stress: None   Relationships    Social connections:     Talks on phone: None     Gets together: None     Attends Congregation service: None     Active member of club or organization: None     Attends meetings of clubs or organizations: None     Relationship status: None    Intimate partner violence:     Fear of current or ex partner: None     Emotionally abused: None     Physically abused: None     Forced sexual activity: None   Other Topics Concern    None   Social History Narrative    None       SCREENINGS             PHYSICAL EXAM    (up to 7 for level 4, 8 or more for level 5)     ED Triage Vitals [04/03/19 1325]   BP Temp Temp Source Pulse Resp SpO2 Height Weight   114/83 98.5 °F (36.9 °C) Oral 111 18 98 % 5' 7\" (1.702 m) 185 lb (83.9 kg)       Physical Exam   Constitutional: She is oriented to person, place, and time. She appears well-developed and well-nourished. Musculoskeletal:   There is mild swelling and tenderness over the lateral malleolus of the left ankle. There is no tenderness over the medial malleolus or the base of 5th metatarsal. There is some mild tenderness over the head of the fibula on the left side. Toe flexion and extension are intact without pain. There is normal sensation and pulses.    Neurological: She is alert and oriented to person, place, and time. Nursing note and vitals reviewed. DIAGNOSTIC RESULTS     EKG: All EKG's are interpreted by the Emergency Department Physician who either signs or Co-signs this chart in the absence of a cardiologist.        RADIOLOGY:   Non-plain film images such as CT, Ultrasound and MRI are read by the radiologist. Plainradiographic images are visualized and preliminarily interpreted by the emergency physician with the below findings:    Radiology report was obtained from [facility] in [place] and was interpreted as negative for fractures of the foot or ankle. Interpretation per the Radiologist below, if available at the time of this note:    XR TIBIA FIBULA LEFT (2 VIEWS)   Final Result      No acute bony pathology            ED BEDSIDE ULTRASOUND:   Performed by ED Physician - none    LABS:  Labs Reviewed - No data to display    All other labs were within normal range or not returned as of this dictation. EMERGENCY DEPARTMENT COURSE and DIFFERENTIALDIAGNOSIS/MDM:   Vitals:    Vitals:    04/03/19 1325   BP: 114/83   Pulse: 111   Resp: 18   Temp: 98.5 °F (36.9 °C)   TempSrc: Oral   SpO2: 98%   Weight: 185 lb (83.9 kg)   Height: 5' 7\" (1.702 m)         CRITICALCARE TIME   Total Critical Care time was 0 minutes, excludingseparately reportable procedures. There was a high probabilityof clinically significant/life threatening deterioration in the patient's condition which required my urgent intervention. CONSULTS:  None    PROCEDURES:  None    FINAL IMPRESSION      1.  Sprain of left ankle, unspecified ligament, initial encounter        DISPOSITION/PLAN   DISPOSITION Decision To Discharge 04/03/2019 02:01:59 PM      PATIENT REFERRED TO:  [address] [address] [facility]  [phone]      As needed      DISCHARGE MEDICATIONS:  New Prescriptions    HYDROCODONE-ACETAMINOPHEN (NORCO) 7.5-325 MG PER TABLET    Take 1 tablet by mouth every 6 hours as needed for Pain for up to 3 days. Intended supply: 3 days.  Take lowest dose possible to manage pain       (Please note that portions ofthis note were completed with a voice recognition program.  Efforts were made to edit the dictations but occasionally words are mis-transcribed.)    Swetha Ashton MD(electronically signed)  Attending Emergency Physician          Swetha Ashton MD  04/03/19 3427

## 2019-04-03 NOTE — ED PROVIDER NOTES
Subjective   patient states she injured her left foot and ankle when she missed a step going down the last 3 steps on a flight of stairs.  She has no other injury other than left lateral ankle that ready rates around medially into the arch of her left foot.  She has no proximal left fibula pain no knee pain.  She has not been able to ambulate due to the pain.            Review of Systems   Musculoskeletal:        Left foot and ankle pain   All other systems reviewed and are negative.      Past Medical History:   Diagnosis Date   • Anxiety    • Migraine        Allergies   Allergen Reactions   • Zoloft [Sertraline Hcl] Other (See Comments)     Migraines        Past Surgical History:   Procedure Laterality Date   •  SECTION     • DENTAL PROCEDURE     • DIAGNOSTIC LAPAROSCOPY N/A 2017    Procedure: DIAGNOSTIC LAPAROSCOPY;  Surgeon: Dandy Shelby MD;  Location: [facility];  Service:        History reviewed. No pertinent family history.    Social History     Socioeconomic History   • Marital status:      Spouse name: Not on file   • Number of children: Not on file   • Years of education: Not on file   • Highest education level: Not on file   Tobacco Use   • Smoking status: Never Smoker   Substance and Sexual Activity   • Alcohol use: No   • Drug use: No   • Sexual activity: Defer           Objective   Physical Exam   Constitutional: She appears well-developed and well-nourished. No distress.   HENT:   Head: Normocephalic and atraumatic.   Neck: Normal range of motion.   Cardiovascular: Normal rate and regular rhythm.   Pulmonary/Chest: Effort normal.   Musculoskeletal:   Pain and tenderness to the left lateral and anterior ankle with lateral swelling.  Tenderness to the arch and instep of the left foot   Neurological: She is alert.   Skin: Skin is warm and dry. Capillary refill takes less than 2 seconds. She is not diaphoretic.   Psychiatric: She has a normal mood and affect. Her behavior is  normal. Judgment and thought content normal.       Procedures           ED Course                  MDM      Final diagnoses:   Sprain of ankle, unspecified laterality, unspecified ligament, initial encounter            Bobby Pascal PA-C  04/03/19 0027

## 2019-04-03 NOTE — ED TRIAGE NOTES
Pt states she fell down stairs last night left ankle and foot pain. States she did go to er in [place], had and xray done.   But she says  never went into room

## 2019-04-04 ENCOUNTER — HOSPITAL ENCOUNTER (OUTPATIENT)
Dept: ULTRASOUND IMAGING | Facility: HOSPITAL | Age: 24
Discharge: HOME | End: 2019-04-04
Payer: COMMERCIAL

## 2019-04-04 DIAGNOSIS — E04.9 ENLARGEMENT OF THYROID: ICD-10-CM

## 2019-04-04 PROCEDURE — 76536 US EXAM OF HEAD AND NECK: CPT

## 2019-04-19 ENCOUNTER — APPOINTMENT (OUTPATIENT)
Dept: GENERAL RADIOLOGY | Facility: HOSPITAL | Age: 24
End: 2019-04-19
Payer: COMMERCIAL

## 2019-04-19 ENCOUNTER — HOSPITAL ENCOUNTER (EMERGENCY)
Facility: HOSPITAL | Age: 24
Discharge: HOME | End: 2019-04-19
Attending: FAMILY MEDICINE
Payer: COMMERCIAL

## 2019-04-19 VITALS
WEIGHT: 180 LBS | OXYGEN SATURATION: 97 % | HEIGHT: 67 IN | HEART RATE: 87 BPM | DIASTOLIC BLOOD PRESSURE: 73 MMHG | BODY MASS INDEX: 28.25 KG/M2 | RESPIRATION RATE: 16 BRPM | SYSTOLIC BLOOD PRESSURE: 113 MMHG | TEMPERATURE: 98.4 F

## 2019-04-19 DIAGNOSIS — S93.402A MODERATE LEFT ANKLE SPRAIN, INITIAL ENCOUNTER: Primary | ICD-10-CM

## 2019-04-19 PROCEDURE — 73610 X-RAY EXAM OF ANKLE: CPT

## 2019-04-19 PROCEDURE — 99283 EMERGENCY DEPT VISIT LOW MDM: CPT

## 2019-04-19 PROCEDURE — 6370000000 HC RX 637 (ALT 250 FOR IP): Performed by: FAMILY MEDICINE

## 2019-04-19 RX ORDER — TRAMADOL HYDROCHLORIDE 50 MG/1
50 TABLET ORAL EVERY 4 HOURS PRN
Qty: 12 TABLET | Refills: 0 | Status: SHIPPED | OUTPATIENT
Start: 2019-04-19 | End: 2019-04-22

## 2019-04-19 RX ORDER — NAPROXEN 500 MG/1
500 TABLET ORAL ONCE
Status: COMPLETED | OUTPATIENT
Start: 2019-04-19 | End: 2019-04-19

## 2019-04-19 RX ORDER — TRAMADOL HYDROCHLORIDE 50 MG/1
50 TABLET ORAL ONCE
Status: COMPLETED | OUTPATIENT
Start: 2019-04-19 | End: 2019-04-19

## 2019-04-19 RX ADMIN — NAPROXEN 500 MG: 500 TABLET ORAL at 22:13

## 2019-04-19 RX ADMIN — TRAMADOL HYDROCHLORIDE 50 MG: 50 TABLET, FILM COATED ORAL at 22:13

## 2019-04-19 ASSESSMENT — PAIN SCALES - GENERAL
PAINLEVEL_OUTOF10: 8
PAINLEVEL_OUTOF10: 7
PAINLEVEL_OUTOF10: 8

## 2019-04-19 ASSESSMENT — PAIN DESCRIPTION - FREQUENCY: FREQUENCY: CONTINUOUS

## 2019-04-19 ASSESSMENT — PAIN DESCRIPTION - ORIENTATION
ORIENTATION: LEFT
ORIENTATION: LEFT

## 2019-04-19 ASSESSMENT — PAIN DESCRIPTION - PAIN TYPE
TYPE: ACUTE PAIN
TYPE: ACUTE PAIN

## 2019-04-19 ASSESSMENT — PAIN DESCRIPTION - LOCATION
LOCATION: ANKLE
LOCATION: ANKLE

## 2019-04-19 ASSESSMENT — PAIN - FUNCTIONAL ASSESSMENT: PAIN_FUNCTIONAL_ASSESSMENT: 0-10

## 2019-04-19 ASSESSMENT — PAIN DESCRIPTION - DESCRIPTORS
DESCRIPTORS: ACHING;THROBBING
DESCRIPTORS: ACHING

## 2019-04-20 NOTE — ED PROVIDER NOTES
rupture     SECTION      DENTAL SURGERY           CURRENT MEDICATIONS       Previous Medications    ESCITALOPRAM (LEXAPRO) 20 MG TABLET    Take 20 mg by mouth daily    HYDROXYZINE PAMOATE (VISTARIL PO)    Take by mouth    NONFORMULARY    Indications: unknown name unknown dose anxiety or depression med        ALLERGIES     Zoloft [sertraline]    FAMILY HISTORY     History reviewed. No pertinent family history.        SOCIAL HISTORY       Social History     Socioeconomic History    Marital status:      Spouse name: None    Number of children: None    Years of education: None    Highest education level: None   Occupational History    None   Social Needs    Financial resource strain: None    Food insecurity:     Worry: None     Inability: None    Transportation needs:     Medical: None     Non-medical: None   Tobacco Use    Smoking status: Never Smoker    Smokeless tobacco: Never Used   Substance and Sexual Activity    Alcohol use: No     Comment: rare    Drug use: No    Sexual activity: Yes   Lifestyle    Physical activity:     Days per week: None     Minutes per session: None    Stress: None   Relationships    Social connections:     Talks on phone: None     Gets together: None     Attends Jain service: None     Active member of club or organization: None     Attends meetings of clubs or organizations: None     Relationship status: None    Intimate partner violence:     Fear of current or ex partner: None     Emotionally abused: None     Physically abused: None     Forced sexual activity: None   Other Topics Concern    None   Social History Narrative    None       SCREENINGS             PHYSICAL EXAM    (up to 7 for level 4, 8 or more for level 5)     ED Triage Vitals [19 2105]   BP Temp Temp Source Pulse Resp SpO2 Height Weight   137/87 98.4 °F (36.9 °C) Oral 106 18 97 % 5' 7\" (1.702 m) 180 lb (81.6 kg)       Physical Exam   Constitutional: She is oriented to person, place, and time. She appears well-developed and well-nourished. No distress. HENT:   Head: Normocephalic and atraumatic. Eyes: Pupils are equal, round, and reactive to light. Conjunctivae and EOM are normal.   Neck: Neck supple. Cardiovascular: Regular rhythm and normal heart sounds. Pulmonary/Chest: Effort normal and breath sounds normal.   Musculoskeletal: She exhibits edema and tenderness. She exhibits no deformity. Pt with mild tenderness to left lateral malleolus to palpation with mild submalleolar edema. Neurovascularly intact left foot/lower leg. Normal pulse. No dusking or edema of lower leg. Antalgic gait favoring left foot. Neurological: She is alert and oriented to person, place, and time. Skin: Skin is warm and dry. Capillary refill takes less than 2 seconds. No erythema. No ecchymosis   Psychiatric: She has a normal mood and affect. Her behavior is normal.   Nursing note and vitals reviewed. DIAGNOSTIC RESULTS     EKG: All EKG's are interpreted by the Emergency Department Physician who either signs or Co-signs this chart in the absence of a cardiologist.    None    RADIOLOGY:   Non-plain film images such as CT, Ultrasound and MRI are read by the radiologist. Plain radiographic images are visualized andpreliminarily interpreted by the emergency physician with the below findings:    Left Ankle - See Below  Reviewed the recent negative left Tib/Fib    Interpretationper the Radiologist below, if available at the time of this note:    XR ANKLE LEFT (MIN 3 VIEWS)   Final Result      No acute bony findings. ED BEDSIDE ULTRASOUND:   Performed by ED Physician - none    LABS:  Labs Reviewed - No data to display    All other labs were within normal range or not returned as of this dictation.     EMERGENCY DEPARTMENT COURSE and DIFFERENTIAL DIAGNOSIS/MDM:   Vitals:    Vitals:    04/19/19 2105   BP: 137/87   Pulse: 106   Resp: 18   Temp: 98.4 °F (36.9 °C)   TempSrc: Oral   SpO2: 97% Weight: 180 lb (81.6 kg)   Height: 5' 7\" (1.702 m)           CRITICAL CARE TIME   Total Critical Care time was 0 minutes, excluding separatelyreportable procedures. There was a high probability ofclinically significant/life threatening deterioration in the patient's condition which required my urgent intervention. CONSULTS:  None    PROCEDURES:  Walking boot placed on pt in the ED at this time by nurse. Neurovascularly intact left foot and will wear for comfort until seen by PCP on Tues and use crutches as needed. PROGRESS NOTES:    Reviewed the recent ER note and x-ray. Re-xrayed the ankle mortise itself and no fracture. No gross abnormal exam except for continued edema. No total ligation or tear of tendon. Will give ortho referral, NSAIDs, tramadol for pain, and have pt continue staying off it. FINAL IMPRESSION      1. Moderate left ankle sprain, initial encounter Worsening         DISPOSITION/PLAN   DISPOSITION        PATIENT REFERRED TO:  Deloris Bush, JESSICA - CNP  [facility]  [phone]    Schedule an appointment as soon as possible for a visit in 3 days  For follow-up as Ortho here only here once a month and already been here. Needs MRI of her left ankle to assess for any significant ligament damage. Most likely will need Ortho referral and will give 3 days of acute pain meds and NSAIDs      DISCHARGE MEDICATIONS:  New Prescriptions    DICLOFENAC (VOLTAREN) 50 MG EC TABLET    Take 1 tablet by mouth every 8 hours as needed for Pain    TRAMADOL (ULTRAM) 50 MG TABLET    Take 1 tablet by mouth every 4 hours as needed for Pain for up to 3 days. Intended supply: 3 days.  Take lowest dose possible to manage pain       (Please note that portions of this note were completed with a voice recognition program.  Efforts were made to edit the dictations but occasionallywords are mis-transcribed.)    Dharmesh Mckee DO (electronically signed)  Attending Emergency Physician          Minna Crowder Kirk, DO  04/19/19 2710 Shanthi Hercules, DO  04/19/19 220

## 2019-05-12 ENCOUNTER — HOSPITAL ENCOUNTER (EMERGENCY)
Facility: HOSPITAL | Age: 24
Discharge: HOME | End: 2019-05-12
Attending: EMERGENCY MEDICINE
Payer: COMMERCIAL

## 2019-05-12 VITALS
TEMPERATURE: 97.1 F | HEART RATE: 87 BPM | BODY MASS INDEX: 27.28 KG/M2 | SYSTOLIC BLOOD PRESSURE: 99 MMHG | HEIGHT: 68 IN | WEIGHT: 180 LBS | DIASTOLIC BLOOD PRESSURE: 64 MMHG | OXYGEN SATURATION: 99 % | RESPIRATION RATE: 18 BRPM

## 2019-05-12 DIAGNOSIS — T78.40XA ALLERGIC REACTION, INITIAL ENCOUNTER: Primary | ICD-10-CM

## 2019-05-12 PROCEDURE — 96374 THER/PROPH/DIAG INJ IV PUSH: CPT

## 2019-05-12 PROCEDURE — 6360000002 HC RX W HCPCS: Performed by: EMERGENCY MEDICINE

## 2019-05-12 PROCEDURE — 99284 EMERGENCY DEPT VISIT MOD MDM: CPT

## 2019-05-12 PROCEDURE — 96372 THER/PROPH/DIAG INJ SC/IM: CPT

## 2019-05-12 PROCEDURE — 96375 TX/PRO/DX INJ NEW DRUG ADDON: CPT

## 2019-05-12 RX ORDER — DIPHENHYDRAMINE HYDROCHLORIDE 50 MG/ML
50 INJECTION INTRAMUSCULAR; INTRAVENOUS ONCE
Status: COMPLETED | OUTPATIENT
Start: 2019-05-12 | End: 2019-05-12

## 2019-05-12 RX ORDER — DEXAMETHASONE SODIUM PHOSPHATE 10 MG/ML
10 INJECTION INTRAMUSCULAR; INTRAVENOUS ONCE
Status: COMPLETED | OUTPATIENT
Start: 2019-05-12 | End: 2019-05-12

## 2019-05-12 RX ORDER — EPINEPHRINE 1 MG/ML
0.3 INJECTION, SOLUTION, CONCENTRATE INTRAVENOUS ONCE
Status: COMPLETED | OUTPATIENT
Start: 2019-05-12 | End: 2019-05-12

## 2019-05-12 RX ADMIN — EPINEPHRINE 0.3 MG: 1 INJECTION, SOLUTION, CONCENTRATE INTRAVENOUS at 22:12

## 2019-05-12 RX ADMIN — DEXAMETHASONE SODIUM PHOSPHATE 10 MG: 10 INJECTION INTRAMUSCULAR; INTRAVENOUS at 22:04

## 2019-05-12 RX ADMIN — DIPHENHYDRAMINE HYDROCHLORIDE 50 MG: 50 INJECTION, SOLUTION INTRAMUSCULAR; INTRAVENOUS at 22:04

## 2019-05-13 NOTE — ED TRIAGE NOTES
Patient c/o cough & SOA x 30 min PTA. Patient reports she was afraid was having allergic reaction to a hair product that she used about the same time. Patient denies rash, denies itching. No swelling noted.

## 2019-06-29 ENCOUNTER — HOSPITAL ENCOUNTER (EMERGENCY)
Facility: HOSPITAL | Age: 24
Discharge: HOME | End: 2019-06-29
Attending: STUDENT IN AN ORGANIZED HEALTH CARE EDUCATION/TRAINING PROGRAM | Admitting: STUDENT IN AN ORGANIZED HEALTH CARE EDUCATION/TRAINING PROGRAM

## 2019-06-29 VITALS
HEIGHT: 67 IN | HEART RATE: 94 BPM | RESPIRATION RATE: 16 BRPM | TEMPERATURE: 98 F | WEIGHT: 209 LBS | SYSTOLIC BLOOD PRESSURE: 131 MMHG | DIASTOLIC BLOOD PRESSURE: 86 MMHG | OXYGEN SATURATION: 98 % | BODY MASS INDEX: 32.8 KG/M2

## 2019-06-29 DIAGNOSIS — R10.9 NON-SURGICAL ABDOMINAL PAIN: Primary | ICD-10-CM

## 2019-06-29 LAB
B-HCG UR QL: NEGATIVE
BASOPHILS # BLD AUTO: 0.05 10*3/MM3 (ref 0–0.2)
BASOPHILS NFR BLD AUTO: 0.5 % (ref 0–1.5)
BILIRUB UR QL STRIP: NEGATIVE
CLARITY UR: CLEAR
COLOR UR: YELLOW
DEPRECATED RDW RBC AUTO: 38.3 FL (ref 37–54)
EOSINOPHIL # BLD AUTO: 0.19 10*3/MM3 (ref 0–0.4)
EOSINOPHIL NFR BLD AUTO: 1.9 % (ref 0.3–6.2)
ERYTHROCYTE [DISTWIDTH] IN BLOOD BY AUTOMATED COUNT: 12.6 % (ref 12.3–15.4)
GLUCOSE UR STRIP-MCNC: NEGATIVE MG/DL
HCT VFR BLD AUTO: 36.6 % (ref 34–46.6)
HGB BLD-MCNC: 11.9 G/DL (ref 12–15.9)
HGB UR QL STRIP.AUTO: NEGATIVE
IMM GRANULOCYTES # BLD AUTO: 0.03 10*3/MM3 (ref 0–0.05)
IMM GRANULOCYTES NFR BLD AUTO: 0.3 % (ref 0–0.5)
KETONES UR QL STRIP: NEGATIVE
LEUKOCYTE ESTERASE UR QL STRIP.AUTO: NEGATIVE
LYMPHOCYTES # BLD AUTO: 2.51 10*3/MM3 (ref 0.7–3.1)
LYMPHOCYTES NFR BLD AUTO: 24.9 % (ref 19.6–45.3)
MCH RBC QN AUTO: 27 PG (ref 26.6–33)
MCHC RBC AUTO-ENTMCNC: 32.5 G/DL (ref 31.5–35.7)
MCV RBC AUTO: 83 FL (ref 79–97)
MONOCYTES # BLD AUTO: 0.82 10*3/MM3 (ref 0.1–0.9)
MONOCYTES NFR BLD AUTO: 8.1 % (ref 5–12)
NEUTROPHILS # BLD AUTO: 6.49 10*3/MM3 (ref 1.7–7)
NEUTROPHILS NFR BLD AUTO: 64.3 % (ref 42.7–76)
NITRITE UR QL STRIP: NEGATIVE
NRBC BLD AUTO-RTO: 0 /100 WBC (ref 0–0.2)
NT-PROBNP SERPL-MCNC: 26 PG/ML (ref 0–125)
PH UR STRIP.AUTO: 7 [PH] (ref 5–8)
PLATELET # BLD AUTO: 291 10*3/MM3 (ref 140–450)
PMV BLD AUTO: 10.8 FL (ref 6–12)
PROT UR QL STRIP: NEGATIVE
RBC # BLD AUTO: 4.41 10*6/MM3 (ref 3.77–5.28)
SP GR UR STRIP: 1.01 (ref 1–1.03)
UROBILINOGEN UR QL STRIP: NORMAL
WBC NRBC COR # BLD: 10.09 10*3/MM3 (ref 3.4–10.8)

## 2019-06-29 PROCEDURE — 85025 COMPLETE CBC W/AUTO DIFF WBC: CPT | Performed by: STUDENT IN AN ORGANIZED HEALTH CARE EDUCATION/TRAINING PROGRAM

## 2019-06-29 PROCEDURE — 83880 ASSAY OF NATRIURETIC PEPTIDE: CPT | Performed by: STUDENT IN AN ORGANIZED HEALTH CARE EDUCATION/TRAINING PROGRAM

## 2019-06-29 PROCEDURE — 36415 COLL VENOUS BLD VENIPUNCTURE: CPT

## 2019-06-29 PROCEDURE — 81003 URINALYSIS AUTO W/O SCOPE: CPT | Performed by: STUDENT IN AN ORGANIZED HEALTH CARE EDUCATION/TRAINING PROGRAM

## 2019-06-29 PROCEDURE — 81025 URINE PREGNANCY TEST: CPT | Performed by: STUDENT IN AN ORGANIZED HEALTH CARE EDUCATION/TRAINING PROGRAM

## 2019-06-29 PROCEDURE — 99283 EMERGENCY DEPT VISIT LOW MDM: CPT

## 2019-07-26 ENCOUNTER — HOSPITAL ENCOUNTER (EMERGENCY)
Facility: HOSPITAL | Age: 24
Discharge: HOME | End: 2019-07-26
Attending: FAMILY MEDICINE
Payer: COMMERCIAL

## 2019-07-26 VITALS
BODY MASS INDEX: 30.86 KG/M2 | OXYGEN SATURATION: 100 % | RESPIRATION RATE: 16 BRPM | TEMPERATURE: 98.1 F | DIASTOLIC BLOOD PRESSURE: 52 MMHG | SYSTOLIC BLOOD PRESSURE: 107 MMHG | WEIGHT: 200 LBS | HEART RATE: 75 BPM

## 2019-07-26 DIAGNOSIS — N30.01 ACUTE CYSTITIS WITH HEMATURIA: Primary | ICD-10-CM

## 2019-07-26 LAB
A/G RATIO: 1.5 (ref 0.8–2)
ALBUMIN SERPL-MCNC: 4.3 G/DL (ref 3.4–4.8)
ALP BLD-CCNC: 103 U/L (ref 25–100)
ALT SERPL-CCNC: 9 U/L (ref 4–36)
ANION GAP SERPL CALCULATED.3IONS-SCNC: 12 MMOL/L (ref 3–16)
AST SERPL-CCNC: 13 U/L (ref 8–33)
BACTERIA: ABNORMAL /HPF
BASOPHILS ABSOLUTE: 0.1 K/UL (ref 0–0.1)
BASOPHILS RELATIVE PERCENT: 0.5 %
BILIRUB SERPL-MCNC: 0.3 MG/DL (ref 0.3–1.2)
BILIRUBIN URINE: NEGATIVE
BLOOD, URINE: ABNORMAL
BUN BLDV-MCNC: 9 MG/DL (ref 6–20)
CALCIUM SERPL-MCNC: 9.4 MG/DL (ref 8.5–10.5)
CHLORIDE BLD-SCNC: 102 MMOL/L (ref 98–107)
CLARITY: ABNORMAL
CO2: 25 MMOL/L (ref 20–30)
COLOR: YELLOW
CREAT SERPL-MCNC: 0.9 MG/DL (ref 0.4–1.2)
EOSINOPHILS ABSOLUTE: 0.2 K/UL (ref 0–0.4)
EOSINOPHILS RELATIVE PERCENT: 2.1 %
EPITHELIAL CELLS, UA: ABNORMAL /HPF
GFR AFRICAN AMERICAN: >59
GFR NON-AFRICAN AMERICAN: >60
GLOBULIN: 2.9 G/DL
GLUCOSE BLD-MCNC: 103 MG/DL (ref 74–106)
GLUCOSE URINE: NEGATIVE MG/DL
HCG(URINE) PREGNANCY TEST: NEGATIVE
HCT VFR BLD CALC: 41.3 % (ref 37–47)
HEMOGLOBIN: 13.5 G/DL (ref 11.5–16.5)
IMMATURE GRANULOCYTES #: 0.1 K/UL
IMMATURE GRANULOCYTES %: 0.5 % (ref 0–5)
KETONES, URINE: NEGATIVE MG/DL
LEUKOCYTE ESTERASE, URINE: ABNORMAL
LIPASE: 19 U/L (ref 5.6–51.3)
LYMPHOCYTES ABSOLUTE: 2.6 K/UL (ref 1.5–4)
LYMPHOCYTES RELATIVE PERCENT: 22.5 %
MCH RBC QN AUTO: 27.4 PG (ref 27–32)
MCHC RBC AUTO-ENTMCNC: 32.7 G/DL (ref 31–35)
MCV RBC AUTO: 83.9 FL (ref 80–100)
MICROSCOPIC EXAMINATION: YES
MONOCYTES ABSOLUTE: 0.9 K/UL (ref 0.2–0.8)
MONOCYTES RELATIVE PERCENT: 8 %
NEUTROPHILS ABSOLUTE: 7.5 K/UL (ref 2–7.5)
NEUTROPHILS RELATIVE PERCENT: 66.4 %
NITRITE, URINE: NEGATIVE
PDW BLD-RTO: 12.9 % (ref 11–16)
PH UA: 7 (ref 5–8)
PLATELET # BLD: 304 K/UL (ref 150–400)
PMV BLD AUTO: 11.5 FL (ref 6–10)
POTASSIUM REFLEX MAGNESIUM: 4.3 MMOL/L (ref 3.4–5.1)
PROTEIN UA: 100 MG/DL
RBC # BLD: 4.92 M/UL (ref 3.8–5.8)
RBC UA: ABNORMAL /HPF (ref 0–2)
SODIUM BLD-SCNC: 139 MMOL/L (ref 136–145)
SPECIFIC GRAVITY UA: 1.02 (ref 1–1.03)
TOTAL PROTEIN: 7.2 G/DL (ref 6.4–8.3)
URINE REFLEX TO CULTURE: YES
URINE TYPE: ABNORMAL
UROBILINOGEN, URINE: 0.2 E.U./DL
WBC # BLD: 11.4 K/UL (ref 4–11)
WBC UA: ABNORMAL /HPF (ref 0–5)

## 2019-07-26 PROCEDURE — 36415 COLL VENOUS BLD VENIPUNCTURE: CPT

## 2019-07-26 PROCEDURE — 84703 CHORIONIC GONADOTROPIN ASSAY: CPT

## 2019-07-26 PROCEDURE — 80053 COMPREHEN METABOLIC PANEL: CPT

## 2019-07-26 PROCEDURE — 96365 THER/PROPH/DIAG IV INF INIT: CPT

## 2019-07-26 PROCEDURE — 87077 CULTURE AEROBIC IDENTIFY: CPT

## 2019-07-26 PROCEDURE — 6360000002 HC RX W HCPCS: Performed by: FAMILY MEDICINE

## 2019-07-26 PROCEDURE — 85025 COMPLETE CBC W/AUTO DIFF WBC: CPT

## 2019-07-26 PROCEDURE — 81001 URINALYSIS AUTO W/SCOPE: CPT

## 2019-07-26 PROCEDURE — 96375 TX/PRO/DX INJ NEW DRUG ADDON: CPT

## 2019-07-26 PROCEDURE — 87086 URINE CULTURE/COLONY COUNT: CPT

## 2019-07-26 PROCEDURE — 83690 ASSAY OF LIPASE: CPT

## 2019-07-26 PROCEDURE — 99283 EMERGENCY DEPT VISIT LOW MDM: CPT

## 2019-07-26 PROCEDURE — 2580000003 HC RX 258: Performed by: FAMILY MEDICINE

## 2019-07-26 RX ORDER — NITROFURANTOIN 25; 75 MG/1; MG/1
100 CAPSULE ORAL 2 TIMES DAILY
COMMUNITY
End: 2019-08-11

## 2019-07-26 RX ORDER — HYDROCODONE BITARTRATE AND ACETAMINOPHEN 7.5; 325 MG/1; MG/1
1 TABLET ORAL EVERY 6 HOURS PRN
Qty: 10 TABLET | Refills: 0 | Status: SHIPPED | OUTPATIENT
Start: 2019-07-26 | End: 2019-07-29

## 2019-07-26 RX ORDER — ONDANSETRON 4 MG/1
4 TABLET, ORALLY DISINTEGRATING ORAL EVERY 8 HOURS PRN
Qty: 6 TABLET | Refills: 0 | Status: SHIPPED | OUTPATIENT
Start: 2019-07-26 | End: 2019-09-02

## 2019-07-26 RX ORDER — ONDANSETRON 2 MG/ML
4 INJECTION INTRAMUSCULAR; INTRAVENOUS ONCE
Status: COMPLETED | OUTPATIENT
Start: 2019-07-26 | End: 2019-07-26

## 2019-07-26 RX ORDER — KETOROLAC TROMETHAMINE 30 MG/ML
30 INJECTION, SOLUTION INTRAMUSCULAR; INTRAVENOUS ONCE
Status: COMPLETED | OUTPATIENT
Start: 2019-07-26 | End: 2019-07-26

## 2019-07-26 RX ORDER — MIRTAZAPINE 15 MG/1
15 TABLET, FILM COATED ORAL NIGHTLY
COMMUNITY
End: 2019-08-11

## 2019-07-26 RX ADMIN — ONDANSETRON 4 MG: 2 INJECTION INTRAMUSCULAR; INTRAVENOUS at 07:00

## 2019-07-26 RX ADMIN — CEFTRIAXONE 1 G: 1 INJECTION, POWDER, FOR SOLUTION INTRAMUSCULAR; INTRAVENOUS at 07:23

## 2019-07-26 RX ADMIN — KETOROLAC TROMETHAMINE 30 MG: 30 INJECTION, SOLUTION INTRAMUSCULAR at 07:00

## 2019-07-26 ASSESSMENT — ENCOUNTER SYMPTOMS
BACK PAIN: 1
NAUSEA: 1
ABDOMINAL PAIN: 1

## 2019-07-26 ASSESSMENT — PAIN SCALES - GENERAL
PAINLEVEL_OUTOF10: 10
PAINLEVEL_OUTOF10: 10

## 2019-07-26 ASSESSMENT — PAIN DESCRIPTION - DESCRIPTORS: DESCRIPTORS: ACHING;STABBING

## 2019-07-26 ASSESSMENT — PAIN DESCRIPTION - PAIN TYPE: TYPE: ACUTE PAIN

## 2019-07-26 ASSESSMENT — PAIN DESCRIPTION - LOCATION: LOCATION: BACK;ABDOMEN

## 2019-07-26 NOTE — ED PROVIDER NOTES
[address] [place] [address] Court  eMERGENCY dEPARTMENT eNCOUnter      Pt Name: Vernon Holter  MRN: [id number]  [place] 1995  Date of evaluation: 2019  Provider: Sanket Jones MD    [address] [place] [address]       Chief Complaint   Patient presents with    Back Pain    Nausea    Abdominal Pain     per pt, diagnosed with bladder infection yesterday         HISTORY OF PRESENT ILLNESS   (Location/Symptom, Timing/Onset, Context/Setting, Quality, Duration, Modifying Factors, Severity)  Note limiting factors. Vernon Holter is a 27 y.o. female who presents to the emergency department for having low back pain, having nausea, and abdominal pain. Pt states she went to see PCP Elicia Pichardo yesterday and told that she had a urinary tract infection. Pt was placed on antibiotic. Pt doesn't know what antibiotic she takes. Pt without fever. Having nausea but no vomiting. Pain to abdomen is up high and across her belly. Pt states that symptoms started about 5-6 days ago, but thought it was getting better, then started having gross hematuria. Hasn't been able to sleep for past couple days. Pain 10/10, aching, dull pain. Nursing Notes were reviewed. REVIEW OF SYSTEMS    (2-9 systems for level 4, 10 or more forlevel 5)     Review of Systems   Constitutional: Positive for activity change. Gastrointestinal: Positive for abdominal pain and nausea. Genitourinary: Positive for flank pain and hematuria. Musculoskeletal: Positive for back pain. All other systems reviewed and are negative.           PAST MEDICAL HISTORY     Past Medical History:   Diagnosis Date    Anxiety     Depression     Fibromyalgia     Migraine     Miscarriage     Ovarian cyst 2017    ruptured         SURGICAL HISTORY       Past Surgical History:   Procedure Laterality Date    ABDOMEN SURGERY      cleaned out drainage from an ovarian cyst rupture     SECTION      DENTAL SURGERY           CURRENT for the following components:    WBC, UA 20-50 (*)     RBC, UA 20-50 (*)     Bacteria, UA 2+ (*)     All other components within normal limits    Narrative:     Performed at:  [address] [place] [address] Laboratory  [address] [place] [address],  [place], [address]   Phone [phone]   CBC WITH AUTO DIFFERENTIAL - Abnormal; Notable for the following components:    WBC 11.4 (*)     MPV 11.5 (*)     Monocytes # 0.9 (*)     All other components within normal limits    Narrative:     Performed at:  [address] [place] [address] Laboratory  [address] [place] [address],  [place], [address]   Phone [phone]   [facility], URINE    Narrative:     Performed at:  [address] [place] [address] Laboratory  [address] [place] [address],  [place], [address]   Phone [phone]   COMPREHENSIVE METABOLIC PANEL W/ REFLEX TO MG FOR LOW K   LIPASE       All other labs were within normal range or not returned as of this dictation. EMERGENCY DEPARTMENT COURSE and DIFFERENTIAL DIAGNOSIS/MDM:   Vitals:    Vitals:    07/26/19 0621   BP: 118/65   Pulse: 100   Resp: 22   Temp: 98.1 °F (36.7 °C)   TempSrc: Oral   SpO2: 97%   Weight: 200 lb (90.7 kg)           CRITICAL CARE TIME   Total Critical Care time was 0 minutes, excluding separatelyreportable procedures. There was a high probability ofclinically significant/life threatening deterioration in the patient's condition which required my urgent intervention. CONSULTS:  None    PROCEDURES:  None    PROGRESS NOTES:    Will place IV, give Toradol and Zofran, will get UA and check baseline labs. Discussed with pt her frequency of visits as she has been to the ER about 30 times in past 2 years. Pt went to PCP yesterday. Doesn't know the antibiotic she is on. Pt without fever, no vomiting, no gross hematuria with gross view of urine sample given. Unlikely pyelonephritis but will continue work up. Normal temp here.      7:05 AM I have signed

## 2019-07-28 LAB
ORGANISM: ABNORMAL
URINE CULTURE, ROUTINE: ABNORMAL
URINE CULTURE, ROUTINE: ABNORMAL

## 2019-08-11 ENCOUNTER — HOSPITAL ENCOUNTER (EMERGENCY)
Facility: HOSPITAL | Age: 24
Discharge: HOME | End: 2019-08-11
Attending: EMERGENCY MEDICINE
Payer: COMMERCIAL

## 2019-08-11 VITALS
DIASTOLIC BLOOD PRESSURE: 73 MMHG | WEIGHT: 204 LBS | TEMPERATURE: 98.2 F | OXYGEN SATURATION: 98 % | SYSTOLIC BLOOD PRESSURE: 108 MMHG | HEIGHT: 67 IN | HEART RATE: 84 BPM | BODY MASS INDEX: 32.02 KG/M2 | RESPIRATION RATE: 16 BRPM

## 2019-08-11 DIAGNOSIS — G43.909 MIGRAINE WITHOUT STATUS MIGRAINOSUS, NOT INTRACTABLE, UNSPECIFIED MIGRAINE TYPE: Primary | ICD-10-CM

## 2019-08-11 LAB — HCG(URINE) PREGNANCY TEST: NEGATIVE

## 2019-08-11 PROCEDURE — 6360000002 HC RX W HCPCS: Performed by: EMERGENCY MEDICINE

## 2019-08-11 PROCEDURE — 84703 CHORIONIC GONADOTROPIN ASSAY: CPT

## 2019-08-11 PROCEDURE — 99283 EMERGENCY DEPT VISIT LOW MDM: CPT

## 2019-08-11 PROCEDURE — 96361 HYDRATE IV INFUSION ADD-ON: CPT

## 2019-08-11 PROCEDURE — 2580000003 HC RX 258: Performed by: EMERGENCY MEDICINE

## 2019-08-11 PROCEDURE — 96375 TX/PRO/DX INJ NEW DRUG ADDON: CPT

## 2019-08-11 PROCEDURE — 96374 THER/PROPH/DIAG INJ IV PUSH: CPT

## 2019-08-11 RX ORDER — KETOROLAC TROMETHAMINE 30 MG/ML
30 INJECTION, SOLUTION INTRAMUSCULAR; INTRAVENOUS ONCE
Status: COMPLETED | OUTPATIENT
Start: 2019-08-11 | End: 2019-08-11

## 2019-08-11 RX ORDER — METOCLOPRAMIDE HYDROCHLORIDE 5 MG/ML
10 INJECTION INTRAMUSCULAR; INTRAVENOUS ONCE
Status: COMPLETED | OUTPATIENT
Start: 2019-08-11 | End: 2019-08-11

## 2019-08-11 RX ORDER — DEXAMETHASONE SODIUM PHOSPHATE 10 MG/ML
10 INJECTION INTRAMUSCULAR; INTRAVENOUS ONCE
Status: COMPLETED | OUTPATIENT
Start: 2019-08-11 | End: 2019-08-11

## 2019-08-11 RX ORDER — FLUOXETINE 10 MG/1
10 CAPSULE ORAL DAILY
COMMUNITY
End: 2020-01-26

## 2019-08-11 RX ORDER — DIPHENHYDRAMINE HYDROCHLORIDE 50 MG/ML
50 INJECTION INTRAMUSCULAR; INTRAVENOUS ONCE
Status: COMPLETED | OUTPATIENT
Start: 2019-08-11 | End: 2019-08-11

## 2019-08-11 RX ORDER — 0.9 % SODIUM CHLORIDE 0.9 %
1000 INTRAVENOUS SOLUTION INTRAVENOUS ONCE
Status: COMPLETED | OUTPATIENT
Start: 2019-08-11 | End: 2019-08-11

## 2019-08-11 RX ADMIN — KETOROLAC TROMETHAMINE 30 MG: 30 INJECTION, SOLUTION INTRAMUSCULAR; INTRAVENOUS at 20:59

## 2019-08-11 RX ADMIN — METOCLOPRAMIDE 10 MG: 5 INJECTION, SOLUTION INTRAMUSCULAR; INTRAVENOUS at 20:59

## 2019-08-11 RX ADMIN — SODIUM CHLORIDE 1000 ML: 9 INJECTION, SOLUTION INTRAVENOUS at 20:58

## 2019-08-11 RX ADMIN — DEXAMETHASONE SODIUM PHOSPHATE 10 MG: 10 INJECTION INTRAMUSCULAR; INTRAVENOUS at 20:58

## 2019-08-11 RX ADMIN — DIPHENHYDRAMINE HYDROCHLORIDE 50 MG: 50 INJECTION, SOLUTION INTRAMUSCULAR; INTRAVENOUS at 20:59

## 2019-08-11 ASSESSMENT — PAIN SCALES - GENERAL: PAINLEVEL_OUTOF10: 9

## 2019-08-12 ENCOUNTER — HOSPITAL ENCOUNTER (EMERGENCY)
Facility: HOSPITAL | Age: 24
Discharge: HOME | End: 2019-08-12
Attending: EMERGENCY MEDICINE | Admitting: EMERGENCY MEDICINE

## 2019-08-12 ENCOUNTER — APPOINTMENT (OUTPATIENT)
Dept: CT IMAGING | Facility: HOSPITAL | Age: 24
End: 2019-08-12

## 2019-08-12 VITALS
RESPIRATION RATE: 16 BRPM | TEMPERATURE: 98.7 F | HEIGHT: 66 IN | OXYGEN SATURATION: 99 % | BODY MASS INDEX: 33.27 KG/M2 | SYSTOLIC BLOOD PRESSURE: 111 MMHG | HEART RATE: 72 BPM | WEIGHT: 207 LBS | DIASTOLIC BLOOD PRESSURE: 66 MMHG

## 2019-08-12 DIAGNOSIS — D72.829 LEUKOCYTOSIS, UNSPECIFIED TYPE: ICD-10-CM

## 2019-08-12 DIAGNOSIS — R11.0 NAUSEA: ICD-10-CM

## 2019-08-12 DIAGNOSIS — R10.30 LOWER ABDOMINAL PAIN: Primary | ICD-10-CM

## 2019-08-12 LAB
ALBUMIN SERPL-MCNC: 4.2 G/DL (ref 3.5–5.2)
ALBUMIN/GLOB SERPL: 1.4 G/DL
ALP SERPL-CCNC: 87 U/L (ref 39–117)
ALT SERPL W P-5'-P-CCNC: 11 U/L (ref 1–33)
ANION GAP SERPL CALCULATED.3IONS-SCNC: 14.1 MMOL/L (ref 5–15)
AST SERPL-CCNC: 13 U/L (ref 1–32)
B-HCG UR QL: NEGATIVE
BASOPHILS # BLD AUTO: 0.04 10*3/MM3 (ref 0–0.2)
BASOPHILS NFR BLD AUTO: 0.2 % (ref 0–1.5)
BILIRUB SERPL-MCNC: 0.3 MG/DL (ref 0.2–1.2)
BILIRUB UR QL STRIP: NEGATIVE
BUN BLD-MCNC: 8 MG/DL (ref 6–20)
BUN/CREAT SERPL: 10.4 (ref 7–25)
CALCIUM SPEC-SCNC: 8.9 MG/DL (ref 8.6–10.5)
CHLORIDE SERPL-SCNC: 108 MMOL/L (ref 98–107)
CLARITY UR: CLEAR
CO2 SERPL-SCNC: 22.8 MMOL/L (ref 22–29)
COLOR UR: YELLOW
CREAT BLD-MCNC: 0.77 MG/DL (ref 0.57–1)
DEPRECATED RDW RBC AUTO: 39.2 FL (ref 37–54)
EOSINOPHIL # BLD AUTO: 0 10*3/MM3 (ref 0–0.4)
EOSINOPHIL NFR BLD AUTO: 0 % (ref 0.3–6.2)
ERYTHROCYTE [DISTWIDTH] IN BLOOD BY AUTOMATED COUNT: 13 % (ref 12.3–15.4)
GFR SERPL CREATININE-BSD FRML MDRD: 94 ML/MIN/1.73
GLOBULIN UR ELPH-MCNC: 2.9 GM/DL
GLUCOSE BLD-MCNC: 104 MG/DL (ref 65–99)
GLUCOSE UR STRIP-MCNC: NEGATIVE MG/DL
HCG INTACT+B SERPL-ACNC: <0.5 MIU/ML
HCT VFR BLD AUTO: 37.5 % (ref 34–46.6)
HGB BLD-MCNC: 12.3 G/DL (ref 12–15.9)
HGB UR QL STRIP.AUTO: NEGATIVE
IMM GRANULOCYTES # BLD AUTO: 0.09 10*3/MM3 (ref 0–0.05)
IMM GRANULOCYTES NFR BLD AUTO: 0.5 % (ref 0–0.5)
KETONES UR QL STRIP: NEGATIVE
LEUKOCYTE ESTERASE UR QL STRIP.AUTO: NEGATIVE
LIPASE SERPL-CCNC: 16 U/L (ref 13–60)
LYMPHOCYTES # BLD AUTO: 1.54 10*3/MM3 (ref 0.7–3.1)
LYMPHOCYTES NFR BLD AUTO: 8.3 % (ref 19.6–45.3)
MCH RBC QN AUTO: 27.1 PG (ref 26.6–33)
MCHC RBC AUTO-ENTMCNC: 32.8 G/DL (ref 31.5–35.7)
MCV RBC AUTO: 82.6 FL (ref 79–97)
MONOCYTES # BLD AUTO: 1.08 10*3/MM3 (ref 0.1–0.9)
MONOCYTES NFR BLD AUTO: 5.8 % (ref 5–12)
NEUTROPHILS # BLD AUTO: 15.86 10*3/MM3 (ref 1.7–7)
NEUTROPHILS NFR BLD AUTO: 85.2 % (ref 42.7–76)
NITRITE UR QL STRIP: NEGATIVE
NRBC BLD AUTO-RTO: 0 /100 WBC (ref 0–0.2)
PH UR STRIP.AUTO: 6 [PH] (ref 5–8)
PLATELET # BLD AUTO: 313 10*3/MM3 (ref 140–450)
PMV BLD AUTO: 11.3 FL (ref 6–12)
POTASSIUM BLD-SCNC: 3.9 MMOL/L (ref 3.5–5.2)
PROT SERPL-MCNC: 7.1 G/DL (ref 6–8.5)
PROT UR QL STRIP: NEGATIVE
RBC # BLD AUTO: 4.54 10*6/MM3 (ref 3.77–5.28)
SODIUM BLD-SCNC: 141 MMOL/L (ref 136–145)
SP GR UR STRIP: 1.03 (ref 1–1.03)
UROBILINOGEN UR QL STRIP: NORMAL
WBC NRBC COR # BLD: 18.61 10*3/MM3 (ref 3.4–10.8)

## 2019-08-12 PROCEDURE — 83690 ASSAY OF LIPASE: CPT | Performed by: PHYSICIAN ASSISTANT

## 2019-08-12 PROCEDURE — 85025 COMPLETE CBC W/AUTO DIFF WBC: CPT | Performed by: PHYSICIAN ASSISTANT

## 2019-08-12 PROCEDURE — 96374 THER/PROPH/DIAG INJ IV PUSH: CPT

## 2019-08-12 PROCEDURE — 99283 EMERGENCY DEPT VISIT LOW MDM: CPT

## 2019-08-12 PROCEDURE — 80053 COMPREHEN METABOLIC PANEL: CPT | Performed by: PHYSICIAN ASSISTANT

## 2019-08-12 PROCEDURE — 84702 CHORIONIC GONADOTROPIN TEST: CPT | Performed by: PHYSICIAN ASSISTANT

## 2019-08-12 PROCEDURE — 81003 URINALYSIS AUTO W/O SCOPE: CPT | Performed by: EMERGENCY MEDICINE

## 2019-08-12 PROCEDURE — 74177 CT ABD & PELVIS W/CONTRAST: CPT

## 2019-08-12 PROCEDURE — 25010000002 ONDANSETRON PER 1 MG: Performed by: PHYSICIAN ASSISTANT

## 2019-08-12 PROCEDURE — 81025 URINE PREGNANCY TEST: CPT | Performed by: EMERGENCY MEDICINE

## 2019-08-12 PROCEDURE — 25010000002 IOPAMIDOL 61 % SOLUTION: Performed by: EMERGENCY MEDICINE

## 2019-08-12 RX ORDER — SODIUM CHLORIDE 0.9 % (FLUSH) 0.9 %
10 SYRINGE (ML) INJECTION AS NEEDED
Status: DISCONTINUED | OUTPATIENT
Start: 2019-08-12 | End: 2019-08-12 | Stop reason: HOSPADM

## 2019-08-12 RX ORDER — ONDANSETRON 2 MG/ML
4 INJECTION INTRAMUSCULAR; INTRAVENOUS ONCE
Status: COMPLETED | OUTPATIENT
Start: 2019-08-12 | End: 2019-08-12

## 2019-08-12 RX ORDER — PANTOPRAZOLE SODIUM 40 MG/1
40 TABLET, DELAYED RELEASE ORAL DAILY
Qty: 14 TABLET | Refills: 0 | Status: SHIPPED | OUTPATIENT
Start: 2019-08-12 | End: 2019-08-26

## 2019-08-12 RX ORDER — ONDANSETRON 4 MG/1
4 TABLET, ORALLY DISINTEGRATING ORAL EVERY 6 HOURS PRN
Qty: 8 TABLET | Refills: 0 | Status: SHIPPED | OUTPATIENT
Start: 2019-08-12 | End: 2019-08-14

## 2019-08-12 RX ADMIN — ONDANSETRON 4 MG: 2 INJECTION INTRAMUSCULAR; INTRAVENOUS at 15:55

## 2019-08-12 RX ADMIN — SODIUM CHLORIDE 1000 ML: 9 INJECTION, SOLUTION INTRAVENOUS at 14:30

## 2019-08-12 RX ADMIN — IOPAMIDOL 100 ML: 612 INJECTION, SOLUTION INTRAVENOUS at 16:28

## 2019-08-12 NOTE — DISCHARGE INSTRUCTIONS
Symptoms could be due to stomach virus.  May take ondansetron as needed for nausea vomiting.  May take Protonix to help with irritation of the stomach lining.  You should eat a bland diet for the next few days to avoid further GI upset.  Drink plenty of fluids stay well-hydrated.  You will need to follow-up with your primary care physician as well as your OB/GYN as early as possible.  You will need to return to ER for any change, worsening symptoms, or any additional concerns including not limited to severe worsening abdominal pain, intractable vomiting, fever greater than 100.4.

## 2019-08-12 NOTE — ED PROVIDER NOTES
Subjective   Patient is a generally healthy 24-year-old female presenting to the ER for evaluation of abdominal pain.  Patient states this morning upon awakening she had constant lower abdominal cramping which seems to be worse on her left lower side.  She states she is also been nauseous to the point that she took a Zofran.  She states that she had a positive urine pregnancy test this morning and then her repeat pregnancy test at home was negative.  She denies any headache, chest pain, shortness breath, cough, upper abdominal pain, vomiting, diarrhea, melena, hematochezia, dysuria, hematuria, abnormal vaginal discharge, pelvic pain.  She states her last menstrual period was 2019.  Denies any recent travel, sick contacts, antibiotic use.            Review of Systems   All other systems reviewed and are negative.      Past Medical History:   Diagnosis Date   • Anxiety    • Migraine        Allergies   Allergen Reactions   • Zoloft [Sertraline Hcl] Other (See Comments)     Migraines        Past Surgical History:   Procedure Laterality Date   •  SECTION     • DENTAL PROCEDURE     • DIAGNOSTIC LAPAROSCOPY N/A 2017    Procedure: DIAGNOSTIC LAPAROSCOPY;  Surgeon: Dandy Shelby MD;  Location: [facility];  Service:        History reviewed. No pertinent family history.    Social History     Socioeconomic History   • Marital status:      Spouse name: Not on file   • Number of children: Not on file   • Years of education: Not on file   • Highest education level: Not on file   Tobacco Use   • Smoking status: Never Smoker   Substance and Sexual Activity   • Alcohol use: No   • Drug use: No   • Sexual activity: Defer           Objective   Physical Exam   Nursing note and vitals reviewed.    GEN: No acute distress, sitting upright in stretcher.  She is awake alert.  She does not appear toxic.  Head: Normocephalic, atraumatic  Eyes: Pupils equal round reactive to light. EOM intact  ENT: Posterior  pharynx normal in appearance, oral mucosa is moist, tongue midline  Cardiovascular: Regular rate and rhythm   Lungs: Clear to auscultation bilaterally without adventitious sounds  Abdomen: Abdomen is nondistended.  Bowel sounds present.  Soft, tender to palpation in bilateral lower quadrants, worse in the left lower quadrant, no guarding  Extremities: No edema, normal appearance, full ROM. Radial and dorsalis pedis pulses are 2+  Neuro: GCS 15  Psych: Mood and affect are appropriate    Procedures           ED Course  ED Course as of Aug 13 0039   Mon Aug 12, 2019   1410 Color, UA: Yellow [LA]   1410 Appearance, UA: Clear [LA]   1410 Glucose: Negative [LA]   1410 Ketones, UA: Negative [LA]   1410 Bilirubin, UA: Negative [LA]   1410 Blood, UA: Negative [LA]   1410 Leukocytes, UA: Negative [LA]   1410 Nitrite, UA: Negative [LA]   1416 HCG, Urine QL: Negative [LA]   1456 WBC: (!) 18.61 [LA]   1456 Hemoglobin: 12.3 [LA]   1457 Lipase: 16 [LA]   1550 HCG Quantitative: <0.50 [LA]   1550 Discussed results with patient.  Given her high leukocytosis will obtain a CT scan. States she is feeling nauseous currently.  [LA]   1656 Narrative     FINAL REPORT    TECHNIQUE:  Postcontrast axial images through the abdomen and pelvis were  performed.  Coronal reformatted images were submitted. This  study was performed with techniques to keep radiation doses as  low as reasonably achievable (ALARA). Individualized dose  reduction techniques using automated exposure control or  adjustment of mA and/or kV according to the patient's size were  employed.    CLINICAL HISTORY:  Lower quadrant pain, nausea, leukocytosis    FINDINGS:  Abdomen: The lung bases are clear.  The liver is normal in size  and attenuation.  The gallbladder is present.  The spleen is  unremarkable.  The adrenals are normal.  The pancreas is  unremarkable. The kidneys enhance appropriately.  The aorta is  normal in caliber. No free fluid or adenopathy is identified.  No  findings for mechanical bowel obstruction are identified.  Pelvis: The appendix appears normal.  The urinary bladder is  unremarkable. No free fluid, free air, abscess or adenopathy is  identified.  The uterus is retroverted and heterogeneous.  There  are small cysts or follicles in both ovaries.  There are  prominent pelvic venous structures possibly due to pelvic venous  congestion.  Impression     Prominent pelvic venous structures possibly due to pelvic venous  congestion.    Authenticated by Winston Hidalgo MD on 08/12/2019 04:54:31 PM  [LA]   1716 Discussed case with Dr. Riggs.  Her leukocytosis may be related to an acute viral illness.  We will give her Zofran, protonix, discussed diet changes and follow-up with her PCP and OB/GYN.  We also discussed strict return precautions.  She verbalized understanding and was in agreement with this plan of care.  [LA]      ED Course User Index  [LA] Nela Andres PA-C                  MDM  Number of Diagnoses or Management Options  Leukocytosis, unspecified type:   Lower abdominal pain:   Nausea:   Diagnosis management comments: On arrival, patient is in no acute distress.  She is afebrile, normotensive.  Differential includes pregnancy, ectopic pregnancy, ovarian torsion, ovarian cyst, colitis, and other concerns but there is lower concern for PID, appendicitis, ovarian torsion.  Will obtain basic labs, UA, UPT.  We will give IV fluids.    Work-up reveals a white count of 18.6.  No major electrolyte abnormalities.  UPT and beta quant negative for pregnancy.  Urine without signs of infection.  CT revealed pelvic congestion but no concerning signs for intra-abdominal infection.  Discussed this with the patient.  Discussed the case with Dr. Riggs.  Believe we can treat her symptomatically for what could be an acute viral illness and have her follow-up with PCP, OB/GYN as early as possible.  Discussed strict return precautions.  She verbalized understanding and  was in agreement with this plan of care.  She was given prescriptions for Zofran and Protonix       Amount and/or Complexity of Data Reviewed  Clinical lab tests: reviewed and ordered  Tests in the radiology section of CPT®: reviewed and ordered  Discussion of test results with the performing providers: yes  Review and summarize past medical records: yes  Discuss the patient with other providers: yes    Risk of Complications, Morbidity, and/or Mortality  Presenting problems: moderate  Diagnostic procedures: moderate  Management options: low    Patient Progress  Patient progress: stable        Final diagnoses:   Lower abdominal pain   Nausea   Leukocytosis, unspecified type            Nela Andres PA-C  08/13/19 0039

## 2019-08-12 NOTE — ED PROVIDER NOTES
(1.702 m)       Headache resolved after migraine cocktail. Patient asking to go home. The patient will follow-up with their PCP in 1-2 days for reevaluation. If the patient or family members have any further concerns or any worsening symptoms they will return to the ED for reevaluation. CONSULTS:  None    PROCEDURES:  Procedures    CRITICAL CARE TIME    Total Critical Care time was 0 minutes, excluding separately reportable procedures. There was a high probability of clinically significant/life threatening deterioration in the patient's condition which required my urgent intervention. FINAL IMPRESSION      1. Migraine without status migrainosus, not intractable, unspecified migraine type          DISPOSITION/PLAN   DISPOSITION Decision To Discharge 08/11/2019 10:08:35 PM      PATIENT REFERRED TO:  JESSICA Yang CNP  Manpreet Ortiz  [phone]    Schedule an appointment as soon as possible for a visit in 2 days  As needed      DISCHARGE MEDICATIONS:  New Prescriptions    No medications on file       Comment: Please note this report has been produced using speech recognition software and may contain errors related tothat system including errors in grammar, punctuation, and spelling, as well as words and phrases that may be inappropriate. If there are any questions or concerns please feel free to contact the dictating provider forclarification.     Camila Taylor MD  Attending Emergency Physician                  Camila Taylor MD  08/11/19 [id number]

## 2019-09-02 ENCOUNTER — APPOINTMENT (OUTPATIENT)
Dept: GENERAL RADIOLOGY | Facility: HOSPITAL | Age: 24
End: 2019-09-02
Payer: COMMERCIAL

## 2019-09-02 ENCOUNTER — HOSPITAL ENCOUNTER (EMERGENCY)
Facility: HOSPITAL | Age: 24
Discharge: HOME | End: 2019-09-02
Attending: HOSPITALIST
Payer: COMMERCIAL

## 2019-09-02 ENCOUNTER — APPOINTMENT (OUTPATIENT)
Dept: CT IMAGING | Facility: HOSPITAL | Age: 24
End: 2019-09-02
Payer: COMMERCIAL

## 2019-09-02 VITALS
BODY MASS INDEX: 32.96 KG/M2 | DIASTOLIC BLOOD PRESSURE: 48 MMHG | HEIGHT: 67 IN | HEART RATE: 79 BPM | RESPIRATION RATE: 16 BRPM | TEMPERATURE: 98 F | OXYGEN SATURATION: 99 % | SYSTOLIC BLOOD PRESSURE: 106 MMHG | WEIGHT: 210 LBS

## 2019-09-02 DIAGNOSIS — R42 DIZZINESS: ICD-10-CM

## 2019-09-02 DIAGNOSIS — R07.9 CHEST PAIN, UNSPECIFIED TYPE: Primary | ICD-10-CM

## 2019-09-02 LAB
A/G RATIO: 1.4 (ref 0.8–2)
ALBUMIN SERPL-MCNC: 4.2 G/DL (ref 3.4–4.8)
ALP BLD-CCNC: 102 U/L (ref 25–100)
ALT SERPL-CCNC: 15 U/L (ref 4–36)
ANION GAP SERPL CALCULATED.3IONS-SCNC: 13 MMOL/L (ref 3–16)
AST SERPL-CCNC: 18 U/L (ref 8–33)
BASOPHILS ABSOLUTE: 0.1 K/UL (ref 0–0.1)
BASOPHILS RELATIVE PERCENT: 0.8 %
BILIRUB SERPL-MCNC: 0.4 MG/DL (ref 0.3–1.2)
BUN BLDV-MCNC: 9 MG/DL (ref 6–20)
CALCIUM SERPL-MCNC: 9.7 MG/DL (ref 8.5–10.5)
CHLORIDE BLD-SCNC: 99 MMOL/L (ref 98–107)
CO2: 25 MMOL/L (ref 20–30)
CREAT SERPL-MCNC: 0.8 MG/DL (ref 0.4–1.2)
D DIMER: 921 NG/ML DDU
EOSINOPHILS ABSOLUTE: 0.3 K/UL (ref 0–0.4)
EOSINOPHILS RELATIVE PERCENT: 2.5 %
GFR AFRICAN AMERICAN: >59
GFR NON-AFRICAN AMERICAN: >60
GLOBULIN: 3.1 G/DL
GLUCOSE BLD-MCNC: 77 MG/DL (ref 74–106)
HCT VFR BLD CALC: 39.5 % (ref 37–47)
HEMOGLOBIN: 12.8 G/DL (ref 11.5–16.5)
IMMATURE GRANULOCYTES #: 0 K/UL
IMMATURE GRANULOCYTES %: 0.3 % (ref 0–5)
LYMPHOCYTES ABSOLUTE: 2.8 K/UL (ref 1.5–4)
LYMPHOCYTES RELATIVE PERCENT: 27.2 %
MCH RBC QN AUTO: 27.1 PG (ref 27–32)
MCHC RBC AUTO-ENTMCNC: 32.4 G/DL (ref 31–35)
MCV RBC AUTO: 83.5 FL (ref 80–100)
MONOCYTES ABSOLUTE: 0.9 K/UL (ref 0.2–0.8)
MONOCYTES RELATIVE PERCENT: 9 %
NEUTROPHILS ABSOLUTE: 6.3 K/UL (ref 2–7.5)
NEUTROPHILS RELATIVE PERCENT: 60.2 %
PDW BLD-RTO: 13.2 % (ref 11–16)
PLATELET # BLD: 329 K/UL (ref 150–400)
PMV BLD AUTO: 10.8 FL (ref 6–10)
POTASSIUM SERPL-SCNC: 3.9 MMOL/L (ref 3.4–5.1)
RBC # BLD: 4.73 M/UL (ref 3.8–5.8)
SODIUM BLD-SCNC: 137 MMOL/L (ref 136–145)
TOTAL PROTEIN: 7.3 G/DL (ref 6.4–8.3)
TROPONIN: <0.3 NG/ML
WBC # BLD: 10.4 K/UL (ref 4–11)

## 2019-09-02 PROCEDURE — 36415 COLL VENOUS BLD VENIPUNCTURE: CPT

## 2019-09-02 PROCEDURE — 6360000004 HC RX CONTRAST MEDICATION: Performed by: HOSPITALIST

## 2019-09-02 PROCEDURE — 2580000003 HC RX 258: Performed by: HOSPITALIST

## 2019-09-02 PROCEDURE — 85025 COMPLETE CBC W/AUTO DIFF WBC: CPT

## 2019-09-02 PROCEDURE — 84484 ASSAY OF TROPONIN QUANT: CPT

## 2019-09-02 PROCEDURE — 71260 CT THORAX DX C+: CPT

## 2019-09-02 PROCEDURE — 85379 FIBRIN DEGRADATION QUANT: CPT

## 2019-09-02 PROCEDURE — 80053 COMPREHEN METABOLIC PANEL: CPT

## 2019-09-02 PROCEDURE — 71045 X-RAY EXAM CHEST 1 VIEW: CPT

## 2019-09-02 PROCEDURE — 99285 EMERGENCY DEPT VISIT HI MDM: CPT

## 2019-09-02 PROCEDURE — 93005 ELECTROCARDIOGRAM TRACING: CPT

## 2019-09-02 PROCEDURE — 96360 HYDRATION IV INFUSION INIT: CPT

## 2019-09-02 RX ORDER — PRAZOSIN HYDROCHLORIDE 2 MG/1
2 CAPSULE ORAL NIGHTLY
COMMUNITY
End: 2020-01-26

## 2019-09-02 RX ORDER — 0.9 % SODIUM CHLORIDE 0.9 %
1000 INTRAVENOUS SOLUTION INTRAVENOUS ONCE
Status: COMPLETED | OUTPATIENT
Start: 2019-09-02 | End: 2019-09-02

## 2019-09-02 RX ADMIN — SODIUM CHLORIDE 1000 ML: 9 INJECTION, SOLUTION INTRAVENOUS at 15:46

## 2019-09-02 RX ADMIN — IOPAMIDOL 100 ML: 755 INJECTION, SOLUTION INTRAVENOUS at 16:37

## 2019-09-02 ASSESSMENT — PAIN DESCRIPTION - ORIENTATION: ORIENTATION: LEFT

## 2019-09-02 ASSESSMENT — PAIN DESCRIPTION - DESCRIPTORS: DESCRIPTORS: DULL

## 2019-09-02 ASSESSMENT — HEART SCORE: ECG: 0

## 2019-09-02 ASSESSMENT — PAIN DESCRIPTION - ONSET: ONSET: GRADUAL

## 2019-09-02 ASSESSMENT — PAIN DESCRIPTION - FREQUENCY: FREQUENCY: CONTINUOUS

## 2019-09-02 ASSESSMENT — PAIN DESCRIPTION - LOCATION: LOCATION: CHEST

## 2019-09-02 ASSESSMENT — PAIN DESCRIPTION - PROGRESSION: CLINICAL_PROGRESSION: GRADUALLY WORSENING

## 2019-09-02 ASSESSMENT — PAIN DESCRIPTION - PAIN TYPE: TYPE: ACUTE PAIN

## 2019-09-02 ASSESSMENT — PAIN SCALES - GENERAL: PAINLEVEL_OUTOF10: 6
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
## 2019-09-03 ENCOUNTER — HOSPITAL ENCOUNTER (EMERGENCY)
Facility: HOSPITAL | Age: 24
Discharge: HOME | End: 2019-09-03
Attending: EMERGENCY MEDICINE
Payer: COMMERCIAL

## 2019-09-03 VITALS
RESPIRATION RATE: 16 BRPM | HEIGHT: 67 IN | TEMPERATURE: 98.6 F | HEART RATE: 86 BPM | DIASTOLIC BLOOD PRESSURE: 65 MMHG | BODY MASS INDEX: 32.96 KG/M2 | SYSTOLIC BLOOD PRESSURE: 120 MMHG | WEIGHT: 210 LBS | OXYGEN SATURATION: 99 %

## 2019-09-03 DIAGNOSIS — R07.9 CHEST PAIN, UNSPECIFIED TYPE: Primary | ICD-10-CM

## 2019-09-03 DIAGNOSIS — K29.00 ACUTE GASTRITIS WITHOUT HEMORRHAGE, UNSPECIFIED GASTRITIS TYPE: ICD-10-CM

## 2019-09-03 PROCEDURE — 93005 ELECTROCARDIOGRAM TRACING: CPT

## 2019-09-03 PROCEDURE — 6370000000 HC RX 637 (ALT 250 FOR IP): Performed by: EMERGENCY MEDICINE

## 2019-09-03 PROCEDURE — 99284 EMERGENCY DEPT VISIT MOD MDM: CPT

## 2019-09-03 RX ORDER — FAMOTIDINE 20 MG/1
20 TABLET, FILM COATED ORAL 2 TIMES DAILY PRN
Qty: 60 TABLET | Refills: 0 | Status: SHIPPED | OUTPATIENT
Start: 2019-09-03 | End: 2020-03-29

## 2019-09-03 RX ADMIN — LIDOCAINE HYDROCHLORIDE: 20 SOLUTION ORAL; TOPICAL at 15:05

## 2019-09-03 ASSESSMENT — PAIN SCALES - GENERAL: PAINLEVEL_OUTOF10: 5

## 2019-09-03 ASSESSMENT — ENCOUNTER SYMPTOMS
SINUS PRESSURE: 0
VOMITING: 0
COUGH: 0
SORE THROAT: 0
TROUBLE SWALLOWING: 0
RHINORRHEA: 0
DIARRHEA: 0
EYE PAIN: 0
CHEST TIGHTNESS: 0
CONSTIPATION: 0
ABDOMINAL PAIN: 0
SHORTNESS OF BREATH: 0
BACK PAIN: 0
NAUSEA: 0
EYE REDNESS: 0
WHEEZING: 0
EYE DISCHARGE: 0

## 2019-09-03 ASSESSMENT — PAIN DESCRIPTION - PAIN TYPE: TYPE: ACUTE PAIN

## 2019-09-03 ASSESSMENT — PAIN DESCRIPTION - DESCRIPTORS: DESCRIPTORS: ACHING

## 2019-09-03 ASSESSMENT — PAIN DESCRIPTION - LOCATION: LOCATION: CHEST

## 2019-09-03 ASSESSMENT — PAIN DESCRIPTION - ORIENTATION: ORIENTATION: MID

## 2019-09-03 NOTE — ED TRIAGE NOTES
Pt c/o midsternum chest pain 10/10 on pain scale x 3 days. PT states she was here yesterday for same thing and had a complete workup with a chest CT as well. Everything negative.

## 2019-09-04 ENCOUNTER — TRANSCRIBE ORDERS (OUTPATIENT)
Dept: ADMINISTRATIVE | Facility: HOSPITAL | Age: 24
End: 2019-09-04

## 2019-09-04 DIAGNOSIS — R10.11 ABDOMINAL PAIN, RIGHT UPPER QUADRANT: Primary | ICD-10-CM

## 2019-09-11 ENCOUNTER — HOSPITAL ENCOUNTER (OUTPATIENT)
Dept: ULTRASOUND IMAGING | Facility: HOSPITAL | Age: 24
Discharge: HOME | End: 2019-09-11
Admitting: NURSE PRACTITIONER

## 2019-09-11 DIAGNOSIS — R10.11 ABDOMINAL PAIN, RIGHT UPPER QUADRANT: ICD-10-CM

## 2019-09-11 PROCEDURE — 76705 ECHO EXAM OF ABDOMEN: CPT

## 2019-09-19 ENCOUNTER — HOSPITAL ENCOUNTER (OUTPATIENT)
Dept: NUCLEAR MEDICINE | Facility: HOSPITAL | Age: 24
Discharge: HOME | End: 2019-09-19
Payer: COMMERCIAL

## 2019-09-19 VITALS — WEIGHT: 209 LBS | BODY MASS INDEX: 32.73 KG/M2

## 2019-09-19 DIAGNOSIS — R10.11 ABDOMINAL PAIN, RIGHT UPPER QUADRANT: ICD-10-CM

## 2019-09-19 PROCEDURE — A9537 TC99M MEBROFENIN: HCPCS | Performed by: NURSE PRACTITIONER

## 2019-09-19 PROCEDURE — 6360000004 HC RX CONTRAST MEDICATION: Performed by: NURSE PRACTITIONER

## 2019-09-19 PROCEDURE — 78227 HEPATOBIL SYST IMAGE W/DRUG: CPT

## 2019-09-19 PROCEDURE — 3430000000 HC RX DIAGNOSTIC RADIOPHARMACEUTICAL: Performed by: NURSE PRACTITIONER

## 2019-09-19 RX ADMIN — Medication 7.2 MILLICURIE: at 11:18

## 2019-09-19 RX ADMIN — SINCALIDE 1.9 MCG: 5 INJECTION, POWDER, LYOPHILIZED, FOR SOLUTION INTRAVENOUS at 12:21

## 2020-01-15 ENCOUNTER — APPOINTMENT (OUTPATIENT)
Dept: GENERAL RADIOLOGY | Facility: HOSPITAL | Age: 25
End: 2020-01-15

## 2020-01-15 ENCOUNTER — HOSPITAL ENCOUNTER (EMERGENCY)
Facility: HOSPITAL | Age: 25
Discharge: HOME | End: 2020-01-15
Attending: EMERGENCY MEDICINE | Admitting: EMERGENCY MEDICINE

## 2020-01-15 VITALS
TEMPERATURE: 98.3 F | WEIGHT: 215.2 LBS | DIASTOLIC BLOOD PRESSURE: 88 MMHG | HEART RATE: 98 BPM | HEIGHT: 67 IN | SYSTOLIC BLOOD PRESSURE: 133 MMHG | RESPIRATION RATE: 18 BRPM | BODY MASS INDEX: 33.78 KG/M2 | OXYGEN SATURATION: 98 %

## 2020-01-15 DIAGNOSIS — S43.402A SPRAIN OF LEFT SHOULDER, UNSPECIFIED SHOULDER SPRAIN TYPE, INITIAL ENCOUNTER: ICD-10-CM

## 2020-01-15 DIAGNOSIS — S13.9XXA NECK SPRAIN, INITIAL ENCOUNTER: ICD-10-CM
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
## 2020-01-15 LAB — B-HCG UR QL: NEGATIVE

## 2020-01-15 PROCEDURE — 73030 X-RAY EXAM OF SHOULDER: CPT

## 2020-01-15 PROCEDURE — 99283 EMERGENCY DEPT VISIT LOW MDM: CPT

## 2020-01-15 PROCEDURE — 81025 URINE PREGNANCY TEST: CPT | Performed by: PHYSICIAN ASSISTANT

## 2020-01-15 PROCEDURE — 72040 X-RAY EXAM NECK SPINE 2-3 VW: CPT
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
## 2020-01-26 ENCOUNTER — HOSPITAL ENCOUNTER (EMERGENCY)
Facility: HOSPITAL | Age: 25
Discharge: HOME | End: 2020-01-26
Attending: EMERGENCY MEDICINE
Payer: MEDICAID

## 2020-01-26 VITALS
WEIGHT: 215 LBS | HEART RATE: 97 BPM | SYSTOLIC BLOOD PRESSURE: 121 MMHG | TEMPERATURE: 98.1 F | DIASTOLIC BLOOD PRESSURE: 78 MMHG | RESPIRATION RATE: 16 BRPM | HEIGHT: 67 IN | OXYGEN SATURATION: 96 % | BODY MASS INDEX: 33.74 KG/M2

## 2020-01-26 PROCEDURE — 6360000002 HC RX W HCPCS: Performed by: EMERGENCY MEDICINE

## 2020-01-26 PROCEDURE — 6370000000 HC RX 637 (ALT 250 FOR IP): Performed by: EMERGENCY MEDICINE

## 2020-01-26 PROCEDURE — 99282 EMERGENCY DEPT VISIT SF MDM: CPT

## 2020-01-26 PROCEDURE — 96372 THER/PROPH/DIAG INJ SC/IM: CPT

## 2020-01-26 RX ORDER — AZITHROMYCIN 250 MG/1
500 TABLET, FILM COATED ORAL DAILY
Status: DISCONTINUED | OUTPATIENT
Start: 2020-01-26 | End: 2020-01-26 | Stop reason: HOSPADM

## 2020-01-26 RX ORDER — AZITHROMYCIN 250 MG/1
250 TABLET, FILM COATED ORAL DAILY
Qty: 6 TABLET | Refills: 0 | Status: SHIPPED | OUTPATIENT
Start: 2020-01-26 | End: 2020-02-01

## 2020-01-26 RX ORDER — DEXAMETHASONE SODIUM PHOSPHATE 10 MG/ML
10 INJECTION INTRAMUSCULAR; INTRAVENOUS ONCE
Status: COMPLETED | OUTPATIENT
Start: 2020-01-26 | End: 2020-01-26

## 2020-01-26 RX ADMIN — AZITHROMYCIN 500 MG: 250 TABLET, FILM COATED ORAL at 20:51

## 2020-01-26 RX ADMIN — DEXAMETHASONE SODIUM PHOSPHATE 10 MG: 10 INJECTION INTRAMUSCULAR; INTRAVENOUS at 20:51

## 2020-01-26 ASSESSMENT — PAIN SCALES - GENERAL: PAINLEVEL_OUTOF10: 8
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
## 2020-03-29 ENCOUNTER — HOSPITAL ENCOUNTER (EMERGENCY)
Facility: HOSPITAL | Age: 25
Discharge: HOME | End: 2020-03-29
Attending: HOSPITALIST
Payer: MEDICAID

## 2020-03-29 ENCOUNTER — APPOINTMENT (OUTPATIENT)
Dept: CT IMAGING | Facility: HOSPITAL | Age: 25
End: 2020-03-29
Payer: MEDICAID

## 2020-03-29 VITALS
RESPIRATION RATE: 16 BRPM | HEIGHT: 67 IN | SYSTOLIC BLOOD PRESSURE: 126 MMHG | OXYGEN SATURATION: 96 % | HEART RATE: 90 BPM | TEMPERATURE: 98.4 F | WEIGHT: 214 LBS | DIASTOLIC BLOOD PRESSURE: 84 MMHG | BODY MASS INDEX: 33.59 KG/M2

## 2020-03-29 PROCEDURE — 2580000003 HC RX 258: Performed by: HOSPITALIST

## 2020-03-29 PROCEDURE — 96365 THER/PROPH/DIAG IV INF INIT: CPT

## 2020-03-29 PROCEDURE — 99283 EMERGENCY DEPT VISIT LOW MDM: CPT

## 2020-03-29 PROCEDURE — 96375 TX/PRO/DX INJ NEW DRUG ADDON: CPT

## 2020-03-29 PROCEDURE — 6360000002 HC RX W HCPCS: Performed by: HOSPITALIST

## 2020-03-29 PROCEDURE — 70450 CT HEAD/BRAIN W/O DYE: CPT

## 2020-03-29 RX ORDER — FLUTICASONE FUROATE AND VILANTEROL TRIFENATATE 100; 25 UG/1; UG/1
2 POWDER RESPIRATORY (INHALATION) DAILY
COMMUNITY
End: 2020-05-22

## 2020-03-29 RX ORDER — KETOROLAC TROMETHAMINE 30 MG/ML
30 INJECTION, SOLUTION INTRAMUSCULAR; INTRAVENOUS ONCE
Status: COMPLETED | OUTPATIENT
Start: 2020-03-29 | End: 2020-03-29

## 2020-03-29 RX ORDER — DIPHENHYDRAMINE HYDROCHLORIDE 50 MG/ML
INJECTION INTRAMUSCULAR; INTRAVENOUS
Status: DISPENSED
Start: 2020-03-29 | End: 2020-03-30

## 2020-03-29 RX ORDER — KETOROLAC TROMETHAMINE 30 MG/ML
INJECTION, SOLUTION INTRAMUSCULAR; INTRAVENOUS
Status: DISPENSED
Start: 2020-03-29 | End: 2020-03-30

## 2020-03-29 RX ORDER — PROMETHAZINE HYDROCHLORIDE 25 MG/1
25 TABLET ORAL EVERY 6 HOURS PRN
Qty: 10 TABLET | Refills: 0 | Status: SHIPPED | OUTPATIENT
Start: 2020-03-29 | End: 2020-04-05

## 2020-03-29 RX ORDER — FEXOFENADINE HCL 180 MG/1
180 TABLET ORAL DAILY
COMMUNITY
End: 2020-05-22

## 2020-03-29 RX ORDER — DIPHENHYDRAMINE HYDROCHLORIDE 50 MG/ML
25 INJECTION INTRAMUSCULAR; INTRAVENOUS ONCE
Status: COMPLETED | OUTPATIENT
Start: 2020-03-29 | End: 2020-03-29

## 2020-03-29 RX ORDER — 0.9 % SODIUM CHLORIDE 0.9 %
1000 INTRAVENOUS SOLUTION INTRAVENOUS ONCE
Status: COMPLETED | OUTPATIENT
Start: 2020-03-29 | End: 2020-03-29

## 2020-03-29 RX ADMIN — DIPHENHYDRAMINE HYDROCHLORIDE 25 MG: 50 INJECTION INTRAMUSCULAR; INTRAVENOUS at 14:52

## 2020-03-29 RX ADMIN — Medication 25 MG: at 14:55

## 2020-03-29 RX ADMIN — KETOROLAC TROMETHAMINE 30 MG: 30 INJECTION, SOLUTION INTRAMUSCULAR; INTRAVENOUS at 14:51

## 2020-03-29 RX ADMIN — SODIUM CHLORIDE 1000 ML: 9 INJECTION, SOLUTION INTRAVENOUS at 14:50

## 2020-03-29 ASSESSMENT — PAIN DESCRIPTION - LOCATION: LOCATION: HEAD;NECK

## 2020-03-29 ASSESSMENT — PAIN DESCRIPTION - DESCRIPTORS: DESCRIPTORS: ACHING

## 2020-03-29 ASSESSMENT — PAIN SCALES - GENERAL
PAINLEVEL_OUTOF10: 8
PAINLEVEL_OUTOF10: 8

## 2020-03-29 ASSESSMENT — PAIN DESCRIPTION - PAIN TYPE: TYPE: ACUTE PAIN;CHRONIC PAIN

## 2020-03-29 NOTE — ED PROVIDER NOTES
no diarrhea  Musculoskeletal:  No muscle pain, no joint pain  Skin:  No rash, no easy bruising  Neurologic:  No speech problems, + headache, no extremity weakness  Psychiatric:  No anxiety  Genitourinary:  No dysuria, no hematuria    Except as noted above the remainder of the review of systems was reviewed and negative. PAST MEDICAL HISTORY     Past Medical History:   Diagnosis Date    Anxiety     Depression     Fibromyalgia     Migraine     Miscarriage     Ovarian cyst 2017    ruptured    Strep pharyngitis          SURGICAL HISTORY       Past Surgical History:   Procedure Laterality Date    ABDOMEN SURGERY      cleaned out drainage from an ovarian cyst rupture     SECTION      DENTAL SURGERY           CURRENT MEDICATIONS       Previous Medications    FAMOTIDINE (PEPCID) 20 MG TABLET    Take 1 tablet by mouth 2 times daily as needed (gastritis)       ALLERGIES     Zoloft [sertraline]    FAMILY HISTORY     No family history on file.        SOCIAL HISTORY       Social History     Socioeconomic History    Marital status:      Spouse name: Not on file    Number of children: Not on file    Years of education: Not on file    Highest education level: Not on file   Occupational History    Not on file   Social Needs    Financial resource strain: Not on file    Food insecurity     Worry: Not on file     Inability: Not on file    Transportation needs     Medical: Not on file     Non-medical: Not on file   Tobacco Use    Smoking status: Never Smoker    Smokeless tobacco: Never Used   Substance and Sexual Activity    Alcohol use: No     Comment: rare    Drug use: No    Sexual activity: Yes   Lifestyle    Physical activity     Days per week: Not on file     Minutes per session: Not on file    Stress: Not on file   Relationships    Social connections     Talks on phone: Not on file     Gets together: Not on file     Attends Alevism service: Not on file     Active member of club

## 2020-03-30 NOTE — ED NOTES
Reviewed discharge plan with Bea Woods. Encouraged her to f/u with JESSICA Tomlinson CNP and she understood. NAD noted on discharge, gait steady. Reviewed discharge prescription for:     Current Discharge Medication List      START taking these medications    Details   promethazine (PHENERGAN) 25 MG tablet Take 1 tablet by mouth every 6 hours as needed for Nausea  Qty: 10 tablet, Refills: 0             Kalpesh Michaud states understanding of how and when to take medications.       Electronically signed by Vinny Stiles RN on 3/29/2020 at 9:38 PM     Vinny Stiles RN  03/29/20 1681

## 2020-03-30 NOTE — ED NOTES
Pt medicated per md order. Pain 6/10 nad noted. Warm blanket provided.   Positioned for comfort     Gerald Munguia RN  03/29/20 3079

## 2020-03-30 NOTE — ED TRIAGE NOTES
Pt c/o headache and neck pain. No injury or trauma. Pt rates her pin 6/10 aching, stabbing. Pt states she has headache on and off x 1 week.     Signed kirby avila rn

## 2020-03-30 NOTE — ED NOTES
Pt aware of plan for iv, meds and ct scan of her head. She justice contact her brother for a ride home.      Samy Johnson RN  03/29/20 2122

## 2020-03-30 NOTE — ED NOTES
Pt aware of plan to d/c to home. She will contact family for a ride. Iv access removed. Pt states no pain at this time. Encouraged to f/u with pcp in two days.      Ghada Rodrigez RN  03/29/20 1610

## 2020-04-05 ENCOUNTER — HOSPITAL ENCOUNTER (EMERGENCY)
Facility: HOSPITAL | Age: 25
Discharge: HOME | End: 2020-04-05
Attending: EMERGENCY MEDICINE
Payer: MEDICAID

## 2020-04-05 VITALS
RESPIRATION RATE: 18 BRPM | OXYGEN SATURATION: 97 % | BODY MASS INDEX: 33.59 KG/M2 | WEIGHT: 214 LBS | TEMPERATURE: 98.3 F | SYSTOLIC BLOOD PRESSURE: 103 MMHG | DIASTOLIC BLOOD PRESSURE: 41 MMHG | HEART RATE: 84 BPM | HEIGHT: 67 IN

## 2020-04-05 PROCEDURE — 6360000002 HC RX W HCPCS: Performed by: EMERGENCY MEDICINE

## 2020-04-05 PROCEDURE — 96374 THER/PROPH/DIAG INJ IV PUSH: CPT

## 2020-04-05 PROCEDURE — 96375 TX/PRO/DX INJ NEW DRUG ADDON: CPT

## 2020-04-05 PROCEDURE — 99283 EMERGENCY DEPT VISIT LOW MDM: CPT

## 2020-04-05 PROCEDURE — 2580000003 HC RX 258: Performed by: EMERGENCY MEDICINE

## 2020-04-05 RX ORDER — 0.9 % SODIUM CHLORIDE 0.9 %
1000 INTRAVENOUS SOLUTION INTRAVENOUS ONCE
Status: COMPLETED | OUTPATIENT
Start: 2020-04-05 | End: 2020-04-05

## 2020-04-05 RX ORDER — DIPHENHYDRAMINE HYDROCHLORIDE 50 MG/ML
50 INJECTION INTRAMUSCULAR; INTRAVENOUS ONCE
Status: COMPLETED | OUTPATIENT
Start: 2020-04-05 | End: 2020-04-05

## 2020-04-05 RX ORDER — DEXAMETHASONE SODIUM PHOSPHATE 10 MG/ML
10 INJECTION INTRAMUSCULAR; INTRAVENOUS ONCE
Status: COMPLETED | OUTPATIENT
Start: 2020-04-05 | End: 2020-04-05

## 2020-04-05 RX ORDER — METOCLOPRAMIDE HYDROCHLORIDE 5 MG/ML
10 INJECTION INTRAMUSCULAR; INTRAVENOUS ONCE
Status: COMPLETED | OUTPATIENT
Start: 2020-04-05 | End: 2020-04-05

## 2020-04-05 RX ORDER — KETOROLAC TROMETHAMINE 30 MG/ML
30 INJECTION, SOLUTION INTRAMUSCULAR; INTRAVENOUS ONCE
Status: COMPLETED | OUTPATIENT
Start: 2020-04-05 | End: 2020-04-05

## 2020-04-05 RX ADMIN — SODIUM CHLORIDE 1000 ML: 9 INJECTION, SOLUTION INTRAVENOUS at 19:20

## 2020-04-05 RX ADMIN — METOCLOPRAMIDE 10 MG: 5 INJECTION, SOLUTION INTRAMUSCULAR; INTRAVENOUS at 19:20

## 2020-04-05 RX ADMIN — KETOROLAC TROMETHAMINE 30 MG: 30 INJECTION, SOLUTION INTRAMUSCULAR at 19:20

## 2020-04-05 RX ADMIN — DEXAMETHASONE SODIUM PHOSPHATE 10 MG: 10 INJECTION INTRAMUSCULAR; INTRAVENOUS at 19:20

## 2020-04-05 RX ADMIN — DIPHENHYDRAMINE HYDROCHLORIDE 50 MG: 50 INJECTION, SOLUTION INTRAMUSCULAR; INTRAVENOUS at 19:20

## 2020-04-05 ASSESSMENT — PAIN DESCRIPTION - LOCATION: LOCATION: HEAD

## 2020-04-05 ASSESSMENT — PAIN SCALES - GENERAL
PAINLEVEL_OUTOF10: 9
PAINLEVEL_OUTOF10: 9

## 2020-04-05 ASSESSMENT — PAIN DESCRIPTION - PAIN TYPE: TYPE: ACUTE PAIN

## 2020-04-05 NOTE — ED PROVIDER NOTES
[address] [place] [address] Court  eMERGENCY dEPARTMENT eNCOUnter      Pt Name: Trenton Vargas  MRN: [id number]  YOB: 1995  Date ofevaluation:   Provider: Maria R Ramirez MD    [address] [place] [address]       Chief Complaint   Patient presents with    Migraine    Emesis         HISTORY OF PRESENT ILLNESS  (Location/Symptom, Timing/Onset, Context/Setting, Quality, Duration, Modifying Factors, Severity.)   Trenton Vargas is a 27 y.o. female who presents to the emergency department with a migraine headache that she woke up with this morning. Pain is \"sharp\" in nature and behind both eyes. She started vomiting around 2pm and says that she vomited 9-10 times. No diarrhea. No fever. + photophobia +phonophobia. She took Ibuprofen 800mg this morning and then Tylenol 1500mg without relief. Nursing notes were reviewed. REVIEW OF SYSTEMS    (2-9systems for level 4, 10 or more for level 5)   ROS:  General:  No fevers, no chills, no weakness  HEENT: No sore throat, runny nose or ear pain  Cardiovascular:  No chest pain, no palpitations  Respiratory:  No shortness of breath, no cough, no wheezing  Gastrointestinal:  No pain, no nausea, no vomiting, no diarrhea  Musculoskeletal:  No muscle pain, no joint pain  Skin:  No rash, no easy bruising  Genitourinary:  No dysuria, no hematuria  + migraine headache as above. Except as noted above theremainder of the review of systems was reviewed and negative.        PASTMEDICAL HISTORY     Past Medical History:   Diagnosis Date    Anxiety     Depression     Fibromyalgia     Migraine     Miscarriage     Ovarian cyst 2017    ruptured    Strep pharyngitis          SURGICAL HISTORY       Past Surgical History:   Procedure Laterality Date    ABDOMEN SURGERY      cleaned out drainage from an ovarian cyst rupture     SECTION      DENTAL SURGERY           CURRENT MEDICATIONS       Previous Medications    FEXOFENADINE (ALLEGRA ALLERGY) 180 MG TABLET    Take 180 mg by mouth daily    FLUTICASONE-VILANTEROL (BREO ELLIPTA) 100-25 MCG/INH AEPB INHALER    Inhale 2 puffs into the lungs daily       ALLERGIES     Zoloft [sertraline]    FAMILY HISTORY     History reviewed. No pertinent family history. SOCIAL HISTORY       Social History     Socioeconomic History    Marital status:      Spouse name: None    Number of children: None    Years of education: None    Highest education level: None   Occupational History    None   Social Needs    Financial resource strain: None    Food insecurity     Worry: None     Inability: None    Transportation needs     Medical: None     Non-medical: None   Tobacco Use    Smoking status: Never Smoker    Smokeless tobacco: Never Used   Substance and Sexual Activity    Alcohol use: No     Comment: rare    Drug use: No    Sexual activity: Yes   Lifestyle    Physical activity     Days per week: None     Minutes per session: None    Stress: None   Relationships    Social connections     Talks on phone: None     Gets together: None     Attends Congregational service: None     Active member of club or organization: None     Attends meetings of clubs or organizations: None     Relationship status: None    Intimate partner violence     Fear of current or ex partner: None     Emotionally abused: None     Physically abused: None     Forced sexual activity: None   Other Topics Concern    None   Social History Narrative    None         PHYSICAL EXAM    (up to 7 forlevel 4, 8 or more for level 5)     ED Triage Vitals [04/05/20 1849]   BP Temp Temp Source Pulse Resp SpO2 Height Weight   (!) 142/87 98.3 °F (36.8 °C) Oral 86 18 98 % 5' 7\" (1.702 m) 214 lb (97.1 kg)       Physical Exam  General :Patient is awake, alert, oriented, in no acute distress, nontoxic appearing  HEENT: Pupils are equally round and reactive to light, EOMI.    Cardiac: Heart regular rate, rhythm, no murmurs, rubs, or intractable, unspecified migraine type          DISPOSITION/PLAN   DISPOSITION Decision To Discharge 04/05/2020 08:39:51 PM      PATIENT REFERRED TO:  JESSICA Lindsay CNP  [address] [place] [id number]  [phone]    Schedule an appointment as soon as possible for a visit in 2 days  As needed      DISCHARGE MEDICATIONS:  New Prescriptions    No medications on file       Comment: Please note this report has been produced using speech recognition software and may contain errors related tothat system including errors in grammar, punctuation, and spelling, as well as words and phrases that may be inappropriate. If there are any questions or concerns please feel free to contact the dictating provider forclarification.     Billie Mora MD  Attending Emergency Physician                  Billie Mora MD  04/05/20 2040

## 2020-04-05 NOTE — ED TRIAGE NOTES
Patient states she is feeling bad. She states that she woke up this morning with a migraine. Patient states that she started vomiting this morning as well.

## 2020-05-22 ENCOUNTER — APPOINTMENT (OUTPATIENT)
Dept: GENERAL RADIOLOGY | Facility: HOSPITAL | Age: 25
End: 2020-05-22
Payer: MEDICAID

## 2020-05-22 ENCOUNTER — HOSPITAL ENCOUNTER (EMERGENCY)
Facility: HOSPITAL | Age: 25
Discharge: HOME | End: 2020-05-22
Attending: FAMILY MEDICINE
Payer: MEDICAID

## 2020-05-22 VITALS
RESPIRATION RATE: 18 BRPM | HEIGHT: 67 IN | HEART RATE: 76 BPM | WEIGHT: 200 LBS | OXYGEN SATURATION: 98 % | DIASTOLIC BLOOD PRESSURE: 57 MMHG | SYSTOLIC BLOOD PRESSURE: 113 MMHG | TEMPERATURE: 99.2 F | BODY MASS INDEX: 31.39 KG/M2

## 2020-05-22 PROCEDURE — 73562 X-RAY EXAM OF KNEE 3: CPT

## 2020-05-22 PROCEDURE — 96372 THER/PROPH/DIAG INJ SC/IM: CPT

## 2020-05-22 PROCEDURE — 99283 EMERGENCY DEPT VISIT LOW MDM: CPT

## 2020-05-22 PROCEDURE — 6360000002 HC RX W HCPCS: Performed by: FAMILY MEDICINE

## 2020-05-22 RX ORDER — TRAMADOL HYDROCHLORIDE 50 MG/1
50 TABLET ORAL EVERY 4 HOURS PRN
Qty: 12 TABLET | Refills: 0 | Status: SHIPPED | OUTPATIENT
Start: 2020-05-22 | End: 2020-05-25

## 2020-05-22 RX ORDER — MELOXICAM 15 MG/1
15 TABLET ORAL DAILY
Qty: 15 TABLET | Refills: 0 | Status: SHIPPED | OUTPATIENT
Start: 2020-05-22 | End: 2021-01-01

## 2020-05-22 RX ORDER — BUSPIRONE HYDROCHLORIDE 10 MG/1
15 TABLET ORAL 3 TIMES DAILY
COMMUNITY
End: 2021-05-22 | Stop reason: SDUPTHER

## 2020-05-22 RX ORDER — KETOROLAC TROMETHAMINE 30 MG/ML
60 INJECTION, SOLUTION INTRAMUSCULAR; INTRAVENOUS ONCE
Status: COMPLETED | OUTPATIENT
Start: 2020-05-22 | End: 2020-05-22

## 2020-05-22 RX ADMIN — KETOROLAC TROMETHAMINE 60 MG: 30 INJECTION, SOLUTION INTRAMUSCULAR at 19:47

## 2020-05-22 ASSESSMENT — PAIN DESCRIPTION - ORIENTATION: ORIENTATION: RIGHT

## 2020-05-22 ASSESSMENT — PAIN SCALES - GENERAL
PAINLEVEL_OUTOF10: 8
PAINLEVEL_OUTOF10: 8

## 2020-05-22 ASSESSMENT — PAIN DESCRIPTION - PAIN TYPE: TYPE: ACUTE PAIN

## 2020-05-22 ASSESSMENT — PAIN DESCRIPTION - DESCRIPTORS: DESCRIPTORS: RADIATING

## 2020-05-22 ASSESSMENT — PAIN DESCRIPTION - LOCATION: LOCATION: KNEE;HIP

## 2020-05-22 NOTE — ED PROVIDER NOTES
Pupils: Pupils are equal, round, and reactive to light. Cardiovascular:      Rate and Rhythm: Normal rate and regular rhythm. Heart sounds: Normal heart sounds. Pulmonary:      Effort: Pulmonary effort is normal.      Breath sounds: Normal breath sounds. Musculoskeletal: Normal range of motion. General: Tenderness present. No swelling, deformity or signs of injury. Right lower leg: No edema. Left lower leg: No edema. Comments: Tenderness to superior and medial right knee complex. No effusion. No crepitus. Full ROM. No acute thigh tenderness. No edema of right leg. Neurovascularly intact right leg. Normal cap refill and pulses. Skin:     General: Skin is warm and dry. Neurological:      Mental Status: She is alert and oriented to person, place, and time. Psychiatric:         Mood and Affect: Mood normal.         Behavior: Behavior normal.         DIAGNOSTIC RESULTS     EKG: All EKG's are interpreted by the Emergency Department Physician who either signs or Co-signs this chart in the absence of a cardiologist.    None    RADIOLOGY:   Non-plain film images such as CT, Ultrasound and MRI are read by the radiologist. Plain radiographic images are visualized andpreliminarily interpreted by the emergency physician with the below findings:    Right Knee - See Below    Interpretationper the Radiologist below, if available at the time of this note:    XR KNEE RIGHT (3 VIEWS)   Final Result      No acute osseous abnormality. ED BEDSIDE ULTRASOUND:   Performed by ED Physician - none    LABS:  Labs Reviewed - No data to display    All other labs were within normal range or not returned as of this dictation.     EMERGENCY DEPARTMENT COURSE and DIFFERENTIAL DIAGNOSIS/MDM:   Vitals:    Vitals:    05/22/20 1900 05/22/20 1950 05/22/20 2000 05/22/20 2015   BP: 109/63 120/68 120/68 105/67   Pulse:       Resp:       Temp:       TempSrc:       SpO2:       Weight:       Height:

## 2020-05-23 NOTE — ED NOTES
Patient discharge instructions reviewed with verbalized understanding from patient. Medications discussed with verbalized understanding patient had no further questions or concerns.        Souleymane Carreon RN  05/22/20 3888

## 2020-07-27 ENCOUNTER — HOSPITAL ENCOUNTER (OUTPATIENT)
Facility: HOSPITAL | Age: 25
Discharge: HOME | End: 2020-07-27
Payer: MEDICAID

## 2020-07-27 ENCOUNTER — HOSPITAL ENCOUNTER (OUTPATIENT)
Dept: GENERAL RADIOLOGY | Facility: HOSPITAL | Age: 25
Discharge: HOME | End: 2020-07-27
Payer: MEDICAID

## 2020-07-27 PROCEDURE — 71046 X-RAY EXAM CHEST 2 VIEWS: CPT

## 2020-10-30 ENCOUNTER — OFFICE VISIT (OUTPATIENT)
Dept: PULMONOLOGY | Facility: CLINIC | Age: 25
End: 2020-10-30

## 2020-10-30 VITALS
SYSTOLIC BLOOD PRESSURE: 108 MMHG | OXYGEN SATURATION: 99 % | WEIGHT: 172 LBS | DIASTOLIC BLOOD PRESSURE: 72 MMHG | HEIGHT: 67 IN | TEMPERATURE: 98.2 F | RESPIRATION RATE: 16 BRPM | BODY MASS INDEX: 27 KG/M2 | HEART RATE: 89 BPM

## 2020-10-30 DIAGNOSIS — K21.9 GERD WITHOUT ESOPHAGITIS: ICD-10-CM

## 2020-10-30 DIAGNOSIS — J30.1 SEASONAL ALLERGIC RHINITIS DUE TO POLLEN: ICD-10-CM

## 2020-10-30 DIAGNOSIS — R06.09 DYSPNEA ON EXERTION: ICD-10-CM

## 2020-10-30 DIAGNOSIS — J45.40 MODERATE PERSISTENT ASTHMA, UNSPECIFIED WHETHER COMPLICATED: Primary | ICD-10-CM

## 2020-10-30 DIAGNOSIS — R05.9 COUGH: ICD-10-CM

## 2020-10-30 PROCEDURE — 99204 OFFICE O/P NEW MOD 45 MIN: CPT | Performed by: INTERNAL MEDICINE

## 2020-10-30 RX ORDER — FLUTICASONE PROPIONATE 50 MCG
2 SPRAY, SUSPENSION (ML) NASAL DAILY
Qty: 1 BOTTLE | Refills: 5 | Status: SHIPPED | OUTPATIENT
Start: 2020-10-30 | End: 2020-12-01

## 2020-10-30 RX ORDER — MELOXICAM 7.5 MG/1
7.5 TABLET ORAL DAILY
COMMUNITY
End: 2021-10-01

## 2020-10-30 RX ORDER — LAMOTRIGINE 100 MG/1
TABLET ORAL DAILY
COMMUNITY
Start: 2020-10-14

## 2020-10-30 RX ORDER — HYDROXYZINE HYDROCHLORIDE 25 MG/1
50 TABLET, FILM COATED ORAL EVERY 4 HOURS PRN
COMMUNITY
Start: 2020-10-02 | End: 2021-10-01

## 2020-10-30 RX ORDER — CETIRIZINE HYDROCHLORIDE 10 MG/1
10 TABLET ORAL DAILY
Qty: 30 TABLET | Refills: 5 | Status: SHIPPED | OUTPATIENT
Start: 2020-10-30 | End: 2021-01-28

## 2020-10-30 RX ORDER — TOPIRAMATE 100 MG/1
TABLET, FILM COATED ORAL DAILY
COMMUNITY
Start: 2020-10-14 | End: 2021-10-01

## 2020-10-30 RX ORDER — BUSPIRONE HYDROCHLORIDE 15 MG/1
TABLET ORAL 3 TIMES DAILY
COMMUNITY
Start: 2020-10-02

## 2020-10-30 RX ORDER — OMEPRAZOLE 40 MG/1
40 CAPSULE, DELAYED RELEASE ORAL DAILY
Qty: 30 CAPSULE | Refills: 5 | Status: SHIPPED | OUTPATIENT
Start: 2020-10-30

## 2020-10-30 RX ORDER — TRAZODONE HYDROCHLORIDE 50 MG/1
TABLET ORAL NIGHTLY
COMMUNITY
Start: 2020-10-02

## 2020-10-30 RX ORDER — IBUPROFEN 800 MG/1
TABLET ORAL
COMMUNITY
Start: 2020-08-21 | End: 2021-10-01

## 2020-10-30 NOTE — PROGRESS NOTES
New Pulmonary Patient Office Visit      Patient Name: Ashish Wilkerson    Referring Physician: Mickie Hernandez APRN    Chief Complaint:    Chief Complaint   Patient presents with   • Consult   • Breathing Problem       History of Present Illness: Ashish Wilkerson is a 25 y.o. female who is here today to establish care with Pulmonary.      Asthma diagnosed in grade school and originally on prn albuterol only. Was sent to allergy about 3 yrs ago and was initially started on Qvar and then switched to Advair by PCP.  Attempted IT, but unable to tolerate it.      Duration: Asthma diagnosed as a child  Severity: moderate to severe persistent based on symptoms  Exercise Tolerance: able to walk on flat ground for 10 minutes  Night Symptoms: most nights wakes up with cough, but has been this way for years  Timing: daily  Exacerbating Factors: exertion, weather changes, allergy seasons, noxious odors (cleaning solutions)  Relieving Factors: rest and inhalers    Does not feel like current inhalers completely help her symptoms.   Uses rescue inhaler >3-4x per day with relief, but admits that she has a lot of anxiety which may be causing some of her symptoms.    Her mother has cystic fibrosis and she has multiple family members with cystic fibrosis.  She states she has never been tested.  Her children have not been tested either.  She does admit to chronic bronchitis, but has never had to be admitted for pneumonia.  States she gets antibiotics at least 2 times per year.  She also requires prednisone tapers, but cannot recall the last time she required steroids.  She thinks she gets these at least once a year.        Subjective      Review of Systems:   Review of Systems   Constitutional: Positive for fatigue and unexpected weight loss. Negative for appetite change, fever and unexpected weight gain.        Weight loss felt to be due to Topamax   HENT: Positive for postnasal drip, sinus pressure and sneezing.  Negative for nosebleeds, sore throat and voice change.    Eyes: Negative for discharge and visual disturbance.   Respiratory: Positive for cough, shortness of breath and wheezing.    Cardiovascular: Negative for chest pain, palpitations and leg swelling.   Gastrointestinal: Positive for GERD. Negative for abdominal pain, blood in stool, constipation and diarrhea.   Endocrine: Positive for heat intolerance. Negative for cold intolerance.   Genitourinary: Positive for frequency. Negative for difficulty urinating.   Musculoskeletal: Positive for arthralgias and joint swelling. Negative for myalgias.   Skin: Negative for rash and bruise.   Allergic/Immunologic: Negative for immunocompromised state.   Neurological: Negative for dizziness and weakness.   Hematological: Negative for adenopathy. Does not bruise/bleed easily.   Psychiatric/Behavioral: Positive for depressed mood. Negative for suicidal ideas. The patient is nervous/anxious.        Past Medical History:   Past Medical History:   Diagnosis Date   • Anxiety    • Migraine        Past Surgical History:   Past Surgical History:   Procedure Laterality Date   •  SECTION     • DENTAL PROCEDURE     • DIAGNOSTIC LAPAROSCOPY N/A 2017    Procedure: DIAGNOSTIC LAPAROSCOPY;  Surgeon: Dandy Shelby MD;  Location: [facility];  Service:        Family History:   Family History   Problem Relation Age of Onset   • Cancer Mother    • Cystic fibrosis Mother    • Lupus Mother        Social History:   Social History     Socioeconomic History   • Marital status:      Spouse name: Not on file   • Number of children: Not on file   • Years of education: Not on file   • Highest education level: Not on file   Tobacco Use   • Smoking status: Never Smoker   Substance and Sexual Activity   • Alcohol use: No   • Drug use: No   • Sexual activity: Defer       Medications:     Current Outpatient Medications:   •  busPIRone (BUSPAR) 15 MG tablet, 3 (Three) Times a Day.,  "Disp: , Rfl:   •  hydrOXYzine (ATARAX) 25 MG tablet, 50 mg Every 4 (Four) Hours As Needed., Disp: , Rfl:   •   MG tablet, , Disp: , Rfl:   •  lamoTRIgine (LaMICtal) 100 MG tablet, Daily., Disp: , Rfl:   •  meloxicam (MOBIC) 7.5 MG tablet, Take 7.5 mg by mouth Daily., Disp: , Rfl:   •  topiramate (TOPAMAX) 100 MG tablet, Daily., Disp: , Rfl:   •  traZODone (DESYREL) 50 MG tablet, Every Night., Disp: , Rfl:   •  cetirizine (zyrTEC) 10 MG tablet, Take 1 tablet by mouth Daily., Disp: 30 tablet, Rfl: 5  •  FLUoxetine HCl (PROZAC PO), Take 1 tablet by mouth Daily., Disp: , Rfl:   •  fluticasone (Flonase) 50 MCG/ACT nasal spray, 2 sprays into the nostril(s) as directed by provider Daily., Disp: 1 bottle, Rfl: 5  •  Fluticasone-Umeclidin-Vilant 100-62.5-25 MCG/INH aerosol powder , Inhale 1 puff Daily., Disp: 1 each, Rfl: 5  •  OLANZapine (ZYPREXA PO), Take 1 tablet by mouth Every Night., Disp: , Rfl:   •  omeprazole (priLOSEC) 40 MG capsule, Take 1 capsule by mouth Daily., Disp: 30 capsule, Rfl: 5    Allergies:   Allergies   Allergen Reactions   • Zoloft [Sertraline Hcl] Other (See Comments)     Migraines        Objective     Physical Exam:  Vital Signs:   Vitals:    10/30/20 0917   BP: 108/72   Pulse: 89   Resp: 16   Temp: 98.2 °F (36.8 °C)   SpO2: 99%   Weight: 78 kg (172 lb)   Height: 170.2 cm (67\")     Constitutional: No respiratory distress when speaking complete sentences  General: Pale,  female, nervous  Eyes: Extraocular movement was intact, non-icteric sclera  ENT:No nasal discharge noted. Oropharynx was moist. No lesions noted.  TMs intact bilaterally, mild retraction of TM with fluid noted behind, no erythema  Neck: Supple.  No JVD noted.Trachea midline  Cardiovascular: S1 + S2.  Regular rhythm, regular rate  Lungs/Respiratory:Respiratory effort was normal with out accessory muscle use.  Minimal expiratory wheezing.  Abdomen/GI: Soft.  Bowel sounds were positive.  No obvious " organomegaly.  MSK/Extremities: No significant edema noted.  No cyanosis noted. No clubbing noted  Neurologic: Awake, alert and oriented x 3.Able to follow simple commands.  Psychiatric: Mildly anxious during exam, constantly tapping her foot, does not appear depressed, does appear to have good insight   Skin: No rash noted on visible skin. No excessive bruising noted.      Results Review:   Labs: Reviewed.    Lab Results   Component Value Date    WBC 10.4 09/02/2019    HGB 12.8 09/02/2019    HCT 39.5 09/02/2019    MCV 83.5 09/02/2019     09/02/2019   Results for VAISHNAVI NOEL (MRN [id number]) as of 10/30/2020 09:58   Ref. Range 9/2/2019 15:12   Eosinophil Rel % Latest Units: % 2.5 ((NONE))     Lab Results   Component Value Date    GLUCOSE 104 (H) 08/12/2019    BUN 8 08/12/2019    CREATININE 0.77 08/12/2019    EGFRIFNONA 94 08/12/2019    BCR 10.4 08/12/2019    K 3.9 08/12/2019    CO2 22.8 08/12/2019    CALCIUM 8.9 08/12/2019    ALBUMIN 4.20 08/12/2019    AST 13 08/12/2019    ALT 11 08/12/2019       Micro: As of October 30, 2020   No results found for: RESPCX  No results found for: BLOODCX  Lab Results   Component Value Date    URINECX Mixed Abigail Isolated 07/27/2018     No results found for: MRSACX  No results found for: MRSAPCR  Lab Results   Component Value Date    URCX Yes 07/26/2019    URCX Not Indicated 11/28/2018    URCX Yes 11/21/2018         ABG: No results found for: PHART, AVI0MJC, PO2ART, HGBBG, T0NKORFK, CFIO2, FCOHB, CARBOXYHGB, FMETHB       Radiology Scans:   None available for review      PFT IMPRESSION:   None available for review.    Assessment / Plan      Assessment/Plan:    1. Moderate persistent asthma, unspecified whether complicated  Patient is somewhere between moderate persistent asthma and severe persistent asthma.  She has nighttime awakenings every night which would be more consistent with severe, but does not seem to have the limitation in her exercise tolerance that I  would suspect with someone with severe asthma.  Optimize inhalers and will switch to Trelegy for triple therapy and 1 inhaler.  More aggressive GERD and allergy control.  Check PFTs prior to next visit.  Check lab work to rule out ABPA.  Check labs to evaluate if she is a candidate for Biologics.    - CBC Auto Differential; Future  - IgE Level; Future  - Aspergillus Antibodies; Future  - Pulmonary Function Test; Future    2. Dyspnea on exertion  Appears to be uncontrolled asthma.  Optimize regimen as detailed above.  Check chest x-ray.  If suggestive of bronchiectasis, would need to consider CT scan and possible work-up for cystic fibrosis given family history.    - XR Chest 2 View; Future  - Pulmonary Function Test; Future    3. Cough  Work-up as above.  Most likely related to uncontrolled asthma and upper airway cough syndrome.    - XR Chest 2 View; Future  - Pulmonary Function Test; Future    4. Seasonal allergic rhinitis due to pollen  Start daily antihistamines and nasal steroids    5. GERD without esophagitis  Start PPI.       Follow Up:   Return in about 2 months (around 12/30/2020).    Keila Gonzalez MD  Pulmonary/Critical Care Physician   [facility]      Please note that portions of this note may have been completed with a voice recognition program. Efforts were made to edit the dictations, but occasionally words are mistranscribed.

## 2020-11-04 ENCOUNTER — RESULTS ENCOUNTER (OUTPATIENT)
Dept: PULMONOLOGY | Facility: CLINIC | Age: 25
End: 2020-11-04

## 2020-11-04 DIAGNOSIS — J45.40 MODERATE PERSISTENT ASTHMA, UNSPECIFIED WHETHER COMPLICATED: ICD-10-CM

## 2020-11-25 ENCOUNTER — OFFICE VISIT (OUTPATIENT)
Dept: PULMONOLOGY | Facility: CLINIC | Age: 25
End: 2020-11-25

## 2020-11-25 DIAGNOSIS — R05.9 COUGH: ICD-10-CM

## 2020-11-25 DIAGNOSIS — J45.40 MODERATE PERSISTENT ASTHMA, UNSPECIFIED WHETHER COMPLICATED: ICD-10-CM

## 2020-11-25 DIAGNOSIS — R06.09 DYSPNEA ON EXERTION: ICD-10-CM

## 2020-11-25 PROCEDURE — 94726 PLETHYSMOGRAPHY LUNG VOLUMES: CPT | Performed by: INTERNAL MEDICINE

## 2020-11-25 PROCEDURE — 94729 DIFFUSING CAPACITY: CPT | Performed by: INTERNAL MEDICINE

## 2020-11-25 PROCEDURE — 94060 EVALUATION OF WHEEZING: CPT | Performed by: INTERNAL MEDICINE

## 2020-11-30 ENCOUNTER — HOSPITAL ENCOUNTER (OUTPATIENT)
Facility: HOSPITAL | Age: 25
Discharge: HOME | End: 2020-11-30
Payer: MEDICAID

## 2020-11-30 ENCOUNTER — OUTSIDE FACILITY SERVICE (OUTPATIENT)
Dept: CARDIOLOGY | Facility: CLINIC | Age: 25
End: 2020-11-30

## 2020-11-30 LAB
A/G RATIO: 2.5 (ref 0.8–2)
ALBUMIN SERPL-MCNC: 4.5 G/DL (ref 3.4–4.8)
ALP BLD-CCNC: 77 U/L (ref 25–100)
ALT SERPL-CCNC: 9 U/L (ref 4–36)
ANION GAP SERPL CALCULATED.3IONS-SCNC: 12 MMOL/L (ref 3–16)
AST SERPL-CCNC: 13 U/L (ref 8–33)
BASOPHILS ABSOLUTE: 0 K/UL (ref 0–0.1)
BASOPHILS RELATIVE PERCENT: 0.5 %
BILIRUB SERPL-MCNC: 0.3 MG/DL (ref 0.3–1.2)
BUN BLDV-MCNC: 9 MG/DL (ref 6–20)
CALCIUM SERPL-MCNC: 9 MG/DL (ref 8.5–10.5)
CHLORIDE BLD-SCNC: 108 MMOL/L (ref 98–107)
CHOLESTEROL, TOTAL: 147 MG/DL (ref 0–200)
CO2: 22 MMOL/L (ref 20–30)
CREAT SERPL-MCNC: 0.9 MG/DL (ref 0.4–1.2)
EOSINOPHILS ABSOLUTE: 0.2 K/UL (ref 0–0.4)
EOSINOPHILS RELATIVE PERCENT: 2.8 %
FOLATE: 7.62 NG/ML
GFR AFRICAN AMERICAN: >59
GFR NON-AFRICAN AMERICAN: >60
GLOBULIN: 1.8 G/DL
GLUCOSE BLD-MCNC: 89 MG/DL (ref 74–106)
HBA1C MFR BLD: 4.5 %
HCT VFR BLD CALC: 39.6 % (ref 37–47)
HDLC SERPL-MCNC: 50 MG/DL (ref 40–60)
HEMOGLOBIN: 12.9 G/DL (ref 11.5–16.5)
IMMATURE GRANULOCYTES #: 0 K/UL
IMMATURE GRANULOCYTES %: 0.4 % (ref 0–5)
LDL CHOLESTEROL CALCULATED: 76 MG/DL
LYMPHOCYTES ABSOLUTE: 2 K/UL (ref 1.5–4)
LYMPHOCYTES RELATIVE PERCENT: 25.4 %
MCH RBC QN AUTO: 29.3 PG (ref 27–32)
MCHC RBC AUTO-ENTMCNC: 32.6 G/DL (ref 31–35)
MCV RBC AUTO: 89.8 FL (ref 80–100)
MONOCYTES ABSOLUTE: 0.5 K/UL (ref 0.2–0.8)
MONOCYTES RELATIVE PERCENT: 6.3 %
NEUTROPHILS ABSOLUTE: 5.2 K/UL (ref 2–7.5)
NEUTROPHILS RELATIVE PERCENT: 64.6 %
PDW BLD-RTO: 12.2 % (ref 11–16)
PLATELET # BLD: 254 K/UL (ref 150–400)
PMV BLD AUTO: 11.3 FL (ref 6–10)
POTASSIUM SERPL-SCNC: 3.7 MMOL/L (ref 3.4–5.1)
RBC # BLD: 4.41 M/UL (ref 3.8–5.8)
SODIUM BLD-SCNC: 142 MMOL/L (ref 136–145)
TOTAL PROTEIN: 6.3 G/DL (ref 6.4–8.3)
TRIGL SERPL-MCNC: 106 MG/DL (ref 0–249)
TSH SERPL DL<=0.05 MIU/L-ACNC: 1.56 UIU/ML (ref 0.27–4.2)
VITAMIN B-12: 407 PG/ML (ref 211–911)
VITAMIN D 25-HYDROXY: 33.9 (ref 32–100)
VLDLC SERPL CALC-MCNC: 21 MG/DL
WBC # BLD: 8 K/UL (ref 4–11)

## 2020-11-30 PROCEDURE — 82607 VITAMIN B-12: CPT

## 2020-11-30 PROCEDURE — 82306 VITAMIN D 25 HYDROXY: CPT

## 2020-11-30 PROCEDURE — 84443 ASSAY THYROID STIM HORMONE: CPT

## 2020-11-30 PROCEDURE — 80053 COMPREHEN METABOLIC PANEL: CPT

## 2020-11-30 PROCEDURE — 36415 COLL VENOUS BLD VENIPUNCTURE: CPT

## 2020-11-30 PROCEDURE — 83036 HEMOGLOBIN GLYCOSYLATED A1C: CPT

## 2020-11-30 PROCEDURE — 93010 ELECTROCARDIOGRAM REPORT: CPT | Performed by: INTERNAL MEDICINE

## 2020-11-30 PROCEDURE — 82746 ASSAY OF FOLIC ACID SERUM: CPT

## 2020-11-30 PROCEDURE — 80061 LIPID PANEL: CPT

## 2020-11-30 PROCEDURE — 85025 COMPLETE CBC W/AUTO DIFF WBC: CPT

## 2020-12-01 ENCOUNTER — OFFICE VISIT (OUTPATIENT)
Dept: PULMONOLOGY | Facility: CLINIC | Age: 25
End: 2020-12-01

## 2020-12-01 VITALS
HEIGHT: 67 IN | TEMPERATURE: 97.1 F | WEIGHT: 175 LBS | HEART RATE: 87 BPM | SYSTOLIC BLOOD PRESSURE: 112 MMHG | DIASTOLIC BLOOD PRESSURE: 62 MMHG | BODY MASS INDEX: 27.47 KG/M2 | OXYGEN SATURATION: 94 % | RESPIRATION RATE: 16 BRPM

## 2020-12-01 DIAGNOSIS — J45.40 MODERATE PERSISTENT ASTHMA, UNSPECIFIED WHETHER COMPLICATED: Primary | ICD-10-CM

## 2020-12-01 DIAGNOSIS — J30.1 SEASONAL ALLERGIC RHINITIS DUE TO POLLEN: ICD-10-CM

## 2020-12-01 DIAGNOSIS — Z13.228 CYSTIC FIBROSIS SCREENING: ICD-10-CM

## 2020-12-01 DIAGNOSIS — R05.9 COUGH: ICD-10-CM

## 2020-12-01 DIAGNOSIS — K21.9 GERD WITHOUT ESOPHAGITIS: ICD-10-CM

## 2020-12-01 DIAGNOSIS — R06.09 DYSPNEA ON EXERTION: ICD-10-CM

## 2020-12-01 PROCEDURE — 94729 DIFFUSING CAPACITY: CPT | Performed by: INTERNAL MEDICINE

## 2020-12-01 PROCEDURE — 94726 PLETHYSMOGRAPHY LUNG VOLUMES: CPT | Performed by: INTERNAL MEDICINE

## 2020-12-01 PROCEDURE — 99213 OFFICE O/P EST LOW 20 MIN: CPT | Performed by: INTERNAL MEDICINE

## 2020-12-01 PROCEDURE — 94060 EVALUATION OF WHEEZING: CPT | Performed by: INTERNAL MEDICINE

## 2020-12-01 RX ORDER — HYDROXYZINE 50 MG/1
TABLET, FILM COATED ORAL
COMMUNITY
Start: 2020-10-30

## 2020-12-01 RX ORDER — MOMETASONE FUROATE 50 UG/1
2 SPRAY, METERED NASAL DAILY
Qty: 17 G | Refills: 5 | Status: SHIPPED | OUTPATIENT
Start: 2020-12-01 | End: 2020-12-03

## 2020-12-01 NOTE — PROGRESS NOTES
Pulmonary follow-up office Visit      Chief Complaint:    Chief Complaint   Patient presents with   • Follow-up   • Breathing Problem       Objective: Ashish Wilkerson is a 25 y.o. female who is here today for follow-up on shortness of breath and asthma.    History: Asthma diagnosed in grade school and originally on prn albuterol only.   Attempted IT, but unable to tolerate it.    Current:  Duration: Asthma diagnosed as a child  Severity: moderate at best, fev1 101% on recent pfts  Exercise Tolerance: able to walk on flat ground for 10 minutes  Night Symptoms: none currently   Timing: intermittently  Exacerbating Factors: moderate exertion, extremes of weather, allergy seasons, noxious odors (cleaning solutions)  Relieving Factors: rest and inhalers    Much improved symptoms with Trelegy. Has required albuterol 1x since last visit  Unable to tolerate Flonase, but has been able to tolerate Nasoxnex in the past  Recently started having episodes of syncope.  PCP working up and has sent her to cardiology.  Denies any episodes of dyspnea associated to these episodes of syncope.    Her mother has cystic fibrosis and she has multiple family members with cystic fibrosis.  She states she has never been tested.    No recent infections.  She is now willing to get tested.         Subjective      Review of Systems:   Review of Systems   Constitutional: Positive for unexpected weight loss. Negative for appetite change, fatigue, fever and unexpected weight gain.        Weight loss felt to be due to Topamax   HENT: Positive for postnasal drip and rhinorrhea. Negative for nosebleeds, sinus pressure, sneezing, sore throat and voice change.    Eyes: Negative for discharge and visual disturbance.   Respiratory: Positive for cough. Negative for shortness of breath and wheezing.    Cardiovascular: Negative for chest pain, palpitations and leg swelling.   Gastrointestinal: Positive for GERD. Negative for abdominal pain, blood  in stool, constipation and diarrhea.   Endocrine: Negative for cold intolerance and heat intolerance.   Genitourinary: Negative for difficulty urinating, frequency and hematuria.   Musculoskeletal: Negative for arthralgias, joint swelling and myalgias.   Skin: Negative for rash and bruise.   Allergic/Immunologic: Negative for immunocompromised state.   Neurological: Positive for syncope and light-headedness. Negative for dizziness and weakness.   Hematological: Negative for adenopathy. Does not bruise/bleed easily.   Psychiatric/Behavioral: Positive for depressed mood. Negative for suicidal ideas. The patient is nervous/anxious.      Family History:   Family History   Problem Relation Age of Onset   • Cancer Mother    • Cystic fibrosis Mother    • Lupus Mother        Medications:     Current Outpatient Medications:   •  busPIRone (BUSPAR) 15 MG tablet, 3 (Three) Times a Day., Disp: , Rfl:   •  cetirizine (zyrTEC) 10 MG tablet, Take 1 tablet by mouth Daily., Disp: 30 tablet, Rfl: 5  •  hydrOXYzine (ATARAX) 50 MG tablet, , Disp: , Rfl:   •   MG tablet, , Disp: , Rfl:   •  lamoTRIgine (LaMICtal) 100 MG tablet, Daily., Disp: , Rfl:   •  omeprazole (priLOSEC) 40 MG capsule, Take 1 capsule by mouth Daily., Disp: 30 capsule, Rfl: 5  •  topiramate (TOPAMAX) 100 MG tablet, Daily., Disp: , Rfl:   •  traZODone (DESYREL) 50 MG tablet, Every Night., Disp: , Rfl:   •  FLUoxetine HCl (PROZAC PO), Take 1 tablet by mouth Daily., Disp: , Rfl:   •  Fluticasone Furoate-Vilanterol (Breo Ellipta) 100-25 MCG/INH inhaler, Inhale 1 puff Daily., Disp: 1 inhaler, Rfl: 5  •  hydrOXYzine (ATARAX) 25 MG tablet, 50 mg Every 4 (Four) Hours As Needed., Disp: , Rfl:   •  meloxicam (MOBIC) 7.5 MG tablet, Take 7.5 mg by mouth Daily., Disp: , Rfl:   •  mometasone (NASONEX) 50 MCG/ACT nasal spray, 2 sprays into the nostril(s) as directed by provider Daily., Disp: 17 g, Rfl: 5  •  OLANZapine (ZYPREXA PO), Take 1 tablet by mouth Every Night., Disp:  ", Rfl:     Allergies:   Allergies   Allergen Reactions   • Zoloft [Sertraline Hcl] Other (See Comments)     Migraines        Objective     Physical Exam:  Vital Signs:   Vitals:    12/01/20 1355   BP: 112/62   Pulse: 87   Resp: 16   Temp: 97.1 °F (36.2 °C)   SpO2: 94%   Weight: 79.4 kg (175 lb)   Height: 170.2 cm (67.01\")     Constitutional: No respiratory distress when speaking complete sentences, no cough  General: Pale,  female, appropriate during exam   eyes: Extraocular movement was intact, non-icteric sclera  ENT: No rhinorrhea, chronic postnasal drip changes noted in oropharynx, oropharynx is  Neck: Supple.  No JVD noted.Trachea midline  Cardiovascular: S1 + S2.  Regular rhythm, regular rate  Lungs/Respiratory: Good air movement, clear to auscultation bilaterally  Abdomen/GI: Soft.  No obvious distention noted  MSK/Extremities: No significant edema noted.  No cyanosis noted. No clubbing noted  Neurologic: Awake, alert and oriented x 3.answers questions appropriately and follow commands.  Psychiatric: Much less anxious during today's exam  Skin: No rash noted on visible skin. No excessive bruising noted.      Results Review:   Labs: Reviewed.    WBC   Date Value Ref Range Status   11/30/2020 8.0 4.0 - 11.0 K/uL Final     RBC   Date Value Ref Range Status   11/30/2020 4.41 3.80 - 5.80 M/uL Final     Hemoglobin   Date Value Ref Range Status   11/30/2020 12.9 11.5 - 16.5 g/dL Final     Hematocrit   Date Value Ref Range Status   11/30/2020 39.6 37.0 - 47.0 % Final     MCV   Date Value Ref Range Status   11/30/2020 89.8 80.0 - 100.0 fL Final     MCH   Date Value Ref Range Status   11/30/2020 29.3 27.0 - 32.0 pg Final     MCHC   Date Value Ref Range Status   11/30/2020 32.6 31.0 - 35.0 g/dL Final     RDW   Date Value Ref Range Status   11/30/2020 12.2 11.0 - 16.0 % Final     RDW-SD   Date Value Ref Range Status   08/12/2019 39.2 37.0 - 54.0 fl Final     MPV   Date Value Ref Range Status   11/30/2020 11.3 " (H) 6.0 - 10.0 fL Final     Platelets   Date Value Ref Range Status   11/30/2020 254 150 - 400 K/uL Final     Neutrophil Rel %   Date Value Ref Range Status   11/30/2020 64.6 % Final     Lymphocyte Rel %   Date Value Ref Range Status   11/30/2020 25.4 % Final     Monocyte Rel %   Date Value Ref Range Status   11/30/2020 6.3 % Final     Eosinophil Rel %   Date Value Ref Range Status   11/30/2020 2.8 % Final     Basophil Rel %   Date Value Ref Range Status   11/30/2020 0.5 % Final     Immature Grans %   Date Value Ref Range Status   11/30/2020 0.4 0.0 - 5.0 % Final     Neutrophils Absolute   Date Value Ref Range Status   11/30/2020 5.2 2.0 - 7.5 K/uL Final     Lymphocytes Absolute   Date Value Ref Range Status   11/30/2020 2.0 1.5 - 4.0 K/uL Final     Monocytes Absolute   Date Value Ref Range Status   11/30/2020 0.5 0.2 - 0.8 K/uL Final     Eosinophils Absolute   Date Value Ref Range Status   11/30/2020 0.2 0.0 - 0.4 K/uL Final     Basophils Absolute   Date Value Ref Range Status   11/30/2020 0.0 0.0 - 0.1 K/uL Final     Immature Grans, Absolute   Date Value Ref Range Status   11/30/2020 0.0 K/uL Final     nRBC   Date Value Ref Range Status   08/12/2019 0.0 0.0 - 0.2 /100 WBC Final         Lab Results   Component Value Date    GLUCOSE 104 (H) 08/12/2019    BUN 8 08/12/2019    CREATININE 0.77 08/12/2019    EGFRIFNONA 94 08/12/2019    BCR 10.4 08/12/2019    K 3.9 08/12/2019    CO2 22.8 08/12/2019    CALCIUM 8.9 08/12/2019    ALBUMIN 4.20 08/12/2019    AST 13 08/12/2019    ALT 11 08/12/2019       Micro: As of December 1, 2020   No results found for: RESPCX  No results found for: BLOODCX  Lab Results   Component Value Date    URINECX Mixed Abigail Isolated 07/27/2018     No results found for: MRSACX  No results found for: MRSAPCR  Lab Results   Component Value Date    URCX Yes 07/26/2019    URCX Not Indicated 11/28/2018    URCX Yes 11/21/2018         ABG: No results found for: PHART, IGC8WKQ, PO2ART, HGBBG, U9BTMLSS, CFIO2,  FCOHB, CARBOXYHGB, FMETHB       Radiology Scans:   None available for review      PFT IMPRESSION:    November 2020 PFT showed FEV1 101%, FEV1/FVC ratio 88.  No significant bronchodilator responsiveness.  No air trapping or hyperinflation.  TLC 96%.  Normal diffusing opacity.    Assessment / Plan      Assessment/Plan:    1. Moderate persistent asthma, unspecified whether complicated  Symptoms much better controlled compared to last visit.  PFTs reviewed with patient which were normal.  Either she has very well-controlled asthma on Trelegy versus alternative cause of symptoms.  However, much less albuterol use and no further nighttime awakenings.  Will try transitioning from Trelegy to Breo 100.    2. Dyspnea on exertion  Improved and PFTs with normal lung function.  However, now having syncopal episodes and being worked up by PCP and cardiology    3. Cough  Check Ct scan given chronicity.  I question whether not she has some underlying bronchiectasis and if she has cystic fibrosis given family history.  Check cystic fibrosis testing given family history    4. Seasonal allergic rhinitis due to pollen  Continue with daily antihistamines and switch to Nasonex    5. GERD without esophagitis  Continue with  PPI.     Patient with known family history of cystic fibrosis.  After clinic, had labs drawn and labs now show that she is a carrier which has genetic implications for her children in future generations.  Will refer for genetic counseling.    Follow Up:   Return in about 6 months (around 6/1/2021).    Keila Gonzalez MD  Pulmonary/Critical Care Physician   [facility]      Please note that portions of this note may have been completed with a voice recognition program. Efforts were made to edit the dictations, but occasionally words are mistranscribed.

## 2020-12-03 ENCOUNTER — TELEPHONE (OUTPATIENT)
Dept: PULMONOLOGY | Facility: CLINIC | Age: 25
End: 2020-12-03

## 2020-12-03 DIAGNOSIS — J30.1 SEASONAL ALLERGIC RHINITIS DUE TO POLLEN: Primary | ICD-10-CM

## 2020-12-03 RX ORDER — FLUNISOLIDE 0.25 MG/ML
2 SOLUTION NASAL EVERY 12 HOURS
Qty: 1 BOTTLE | Refills: 5 | Status: SHIPPED | OUTPATIENT
Start: 2020-12-03 | End: 2021-10-01

## 2020-12-03 NOTE — TELEPHONE ENCOUNTER
Patient's pharmacy sent over a PA request for this patient's Mometasone Furoate nasal spray and I did complete the PA and the insurance is still denying to pay for this medication and states the medications that are preferred are flunisolide, ipratropium bromide, or azelastine nasal spray.

## 2020-12-07 ENCOUNTER — HOSPITAL ENCOUNTER (OUTPATIENT)
Facility: HOSPITAL | Age: 25
Discharge: HOME | End: 2020-12-07
Payer: MEDICAID

## 2020-12-07 PROCEDURE — 87086 URINE CULTURE/COLONY COUNT: CPT

## 2020-12-09 LAB — URINE CULTURE, ROUTINE: NORMAL

## 2021-01-01 ENCOUNTER — APPOINTMENT (OUTPATIENT)
Dept: GENERAL RADIOLOGY | Facility: HOSPITAL | Age: 26
End: 2021-01-01
Payer: MEDICAID

## 2021-01-01 ENCOUNTER — HOSPITAL ENCOUNTER (EMERGENCY)
Facility: HOSPITAL | Age: 26
Discharge: HOME | End: 2021-01-01
Attending: HOSPITALIST
Payer: MEDICAID

## 2021-01-01 VITALS
DIASTOLIC BLOOD PRESSURE: 68 MMHG | WEIGHT: 155 LBS | HEART RATE: 86 BPM | TEMPERATURE: 98.2 F | BODY MASS INDEX: 24.33 KG/M2 | SYSTOLIC BLOOD PRESSURE: 111 MMHG | RESPIRATION RATE: 16 BRPM | HEIGHT: 67 IN | OXYGEN SATURATION: 99 %

## 2021-01-01 DIAGNOSIS — M79.641 PAIN OF RIGHT HAND: ICD-10-CM

## 2021-01-01 DIAGNOSIS — M25.531 RIGHT WRIST PAIN: ICD-10-CM

## 2021-01-01 DIAGNOSIS — S60.221A CONTUSION OF RIGHT HAND, INITIAL ENCOUNTER: Primary | ICD-10-CM

## 2021-01-01 PROCEDURE — 73130 X-RAY EXAM OF HAND: CPT

## 2021-01-01 PROCEDURE — 99282 EMERGENCY DEPT VISIT SF MDM: CPT

## 2021-01-01 PROCEDURE — 73110 X-RAY EXAM OF WRIST: CPT

## 2021-01-01 RX ORDER — CETIRIZINE HYDROCHLORIDE 10 MG/1
10 TABLET ORAL NIGHTLY
COMMUNITY
End: 2022-06-21

## 2021-01-01 RX ORDER — IBUPROFEN 800 MG/1
800 TABLET ORAL EVERY 8 HOURS PRN
Qty: 30 TABLET | Refills: 0 | Status: SHIPPED | OUTPATIENT
Start: 2021-01-01 | End: 2021-03-09 | Stop reason: ALTCHOICE

## 2021-01-01 RX ORDER — OMEPRAZOLE 20 MG/1
20 CAPSULE, DELAYED RELEASE ORAL NIGHTLY
COMMUNITY
End: 2021-05-22 | Stop reason: SDUPTHER

## 2021-01-01 RX ORDER — LAMOTRIGINE 100 MG/1
100 TABLET ORAL NIGHTLY
COMMUNITY
End: 2022-10-29

## 2021-01-01 RX ORDER — HYDROXYZINE 50 MG/1
50 TABLET, FILM COATED ORAL EVERY 6 HOURS PRN
COMMUNITY
End: 2022-10-29

## 2021-01-01 ASSESSMENT — PAIN DESCRIPTION - ORIENTATION: ORIENTATION: RIGHT
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
## 2021-01-01 NOTE — ED NOTES
Dc instructions given to patient, patient aware that rx sent electronically to [facility] pharmacy. Patient with no other questions or concerns.      Martha Garrett RN  01/01/21 4837
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
## 2021-01-27 ENCOUNTER — HOSPITAL ENCOUNTER (OUTPATIENT)
Dept: CT IMAGING | Facility: HOSPITAL | Age: 26
Discharge: HOME | End: 2021-01-27
Admitting: INTERNAL MEDICINE

## 2021-01-27 DIAGNOSIS — R05.9 COUGH: ICD-10-CM

## 2021-01-27 DIAGNOSIS — R06.09 DYSPNEA ON EXERTION: ICD-10-CM

## 2021-01-27 PROCEDURE — 71250 CT THORAX DX C-: CPT

## 2021-01-28 RX ORDER — FEXOFENADINE HCL 180 MG/1
180 TABLET ORAL DAILY
Qty: 30 TABLET | Refills: 5 | Status: SHIPPED | OUTPATIENT
Start: 2021-01-28 | End: 2021-10-01

## 2021-03-08 ENCOUNTER — HOSPITAL ENCOUNTER (EMERGENCY)
Facility: HOSPITAL | Age: 26
Discharge: HOME | End: 2021-03-08
Attending: EMERGENCY MEDICINE
Payer: MEDICAID

## 2021-03-08 VITALS
BODY MASS INDEX: 23.54 KG/M2 | WEIGHT: 150 LBS | HEART RATE: 80 BPM | OXYGEN SATURATION: 100 % | RESPIRATION RATE: 16 BRPM | TEMPERATURE: 98.2 F | DIASTOLIC BLOOD PRESSURE: 47 MMHG | SYSTOLIC BLOOD PRESSURE: 101 MMHG | HEIGHT: 67 IN

## 2021-03-08 DIAGNOSIS — G43.909 MIGRAINE WITHOUT STATUS MIGRAINOSUS, NOT INTRACTABLE, UNSPECIFIED MIGRAINE TYPE: Primary | ICD-10-CM

## 2021-03-08 PROCEDURE — 6360000002 HC RX W HCPCS: Performed by: EMERGENCY MEDICINE

## 2021-03-08 PROCEDURE — 99282 EMERGENCY DEPT VISIT SF MDM: CPT

## 2021-03-08 PROCEDURE — 96374 THER/PROPH/DIAG INJ IV PUSH: CPT

## 2021-03-08 PROCEDURE — 96375 TX/PRO/DX INJ NEW DRUG ADDON: CPT

## 2021-03-08 PROCEDURE — 2580000003 HC RX 258: Performed by: EMERGENCY MEDICINE

## 2021-03-08 RX ORDER — DEXAMETHASONE SODIUM PHOSPHATE 10 MG/ML
10 INJECTION INTRAMUSCULAR; INTRAVENOUS ONCE
Status: COMPLETED | OUTPATIENT
Start: 2021-03-08 | End: 2021-03-08

## 2021-03-08 RX ORDER — KETOROLAC TROMETHAMINE 30 MG/ML
30 INJECTION, SOLUTION INTRAMUSCULAR; INTRAVENOUS ONCE
Status: COMPLETED | OUTPATIENT
Start: 2021-03-08 | End: 2021-03-08

## 2021-03-08 RX ORDER — METOCLOPRAMIDE HYDROCHLORIDE 5 MG/ML
10 INJECTION INTRAMUSCULAR; INTRAVENOUS ONCE
Status: COMPLETED | OUTPATIENT
Start: 2021-03-08 | End: 2021-03-08

## 2021-03-08 RX ORDER — DIPHENHYDRAMINE HYDROCHLORIDE 50 MG/ML
50 INJECTION INTRAMUSCULAR; INTRAVENOUS ONCE
Status: COMPLETED | OUTPATIENT
Start: 2021-03-08 | End: 2021-03-08

## 2021-03-08 RX ORDER — 0.9 % SODIUM CHLORIDE 0.9 %
1000 INTRAVENOUS SOLUTION INTRAVENOUS ONCE
Status: COMPLETED | OUTPATIENT
Start: 2021-03-08 | End: 2021-03-08

## 2021-03-08 RX ADMIN — KETOROLAC TROMETHAMINE 30 MG: 30 INJECTION, SOLUTION INTRAMUSCULAR at 20:38

## 2021-03-08 RX ADMIN — METOCLOPRAMIDE 10 MG: 5 INJECTION, SOLUTION INTRAMUSCULAR; INTRAVENOUS at 20:41

## 2021-03-08 RX ADMIN — DEXAMETHASONE SODIUM PHOSPHATE 10 MG: 10 INJECTION INTRAMUSCULAR; INTRAVENOUS at 20:43

## 2021-03-08 RX ADMIN — DIPHENHYDRAMINE HYDROCHLORIDE 50 MG: 50 INJECTION INTRAMUSCULAR; INTRAVENOUS at 20:40

## 2021-03-08 RX ADMIN — SODIUM CHLORIDE 1000 ML: 9 INJECTION, SOLUTION INTRAVENOUS at 20:38

## 2021-03-08 ASSESSMENT — PAIN DESCRIPTION - PAIN TYPE: TYPE: ACUTE PAIN

## 2021-03-08 ASSESSMENT — PAIN DESCRIPTION - LOCATION: LOCATION: HEAD

## 2021-03-08 ASSESSMENT — PAIN SCALES - GENERAL: PAINLEVEL_OUTOF10: 8

## 2021-03-09 ENCOUNTER — CARE COORDINATION (OUTPATIENT)
Dept: CARE COORDINATION | Age: 26
End: 2021-03-09

## 2021-03-09 RX ORDER — NORGESTREL AND ETHINYL ESTRADIOL 0.3-0.03MG
1 KIT ORAL DAILY
COMMUNITY
End: 2022-10-05

## 2021-03-09 RX ORDER — TRAZODONE HYDROCHLORIDE 50 MG/1
50 TABLET ORAL NIGHTLY
COMMUNITY
End: 2022-10-29

## 2021-03-09 NOTE — ED PROVIDER NOTES
[address] [place] [address] Court  eMERGENCY dEPARTMENT eNCOUnter      Pt Name: Geoffrey Marc  MRN: [id number]  YOB: 1995  Date ofevaluation: 6431  Provider: Dayton De La Torre MD    [address] [place] [address]       Chief Complaint   Patient presents with    Headache         HISTORY OF PRESENT ILLNESS  (Location/Symptom, Timing/Onset, Context/Setting, Quality, Duration, Modifying Factors, Severity.)   Geoffrey Marc is a 23 y.o. female who presents to the emergency department with a migraine headache that started 3 days ago. Pain is left frontal/temporal and has been constant since the onset. Pain is \"heavy\" in nature. + photophobia, + phonophobia.  + nausea but no vomiting. She has taking ibuprofen and tylenol without relief. She takes topiramate for her migraines. Nursing notes were reviewed. REVIEW OF SYSTEMS    (2-9systems for level 4, 10 or more for level 5)   ROS:  General:  No fevers, no chills, no weakness  HEENT: No sore throat, runny nose or ear pain  Cardiovascular:  No chest pain, no palpitations  Respiratory:  No shortness of breath, no cough, no wheezing  Gastrointestinal:  No pain, no nausea, no vomiting, no diarrhea  Musculoskeletal:  No muscle pain, no joint pain  Skin:  No rash, no easy bruising  Genitourinary:  No dysuria, no hematuria  + migraine headache as above. Except as noted above theremainder of the review of systems was reviewed and negative.        PASTMEDICAL HISTORY     Past Medical History:   Diagnosis Date    Anxiety     Depression     Fibromyalgia     Migraine     Miscarriage     Ovarian cyst 2017    ruptured    Strep pharyngitis          SURGICAL HISTORY       Past Surgical History:   Procedure Laterality Date    ABDOMEN SURGERY      cleaned out drainage from an ovarian cyst rupture     SECTION      DENTAL SURGERY           CURRENT MEDICATIONS       Previous Medications    BUSPIRONE (BUSPAR) 10 MG TABLET Take 15 mg by mouth 3 times daily     CETIRIZINE (ZYRTEC) 10 MG TABLET    Take 10 mg by mouth nightly     HYDROXYZINE (ATARAX) 50 MG TABLET    Take 50 mg by mouth every 6 hours as needed for Itching    IBUPROFEN (IBU) 800 MG TABLET    Take 1 tablet by mouth every 8 hours as needed for Pain    LAMOTRIGINE (LAMICTAL) 100 MG TABLET    Take 100 mg by mouth nightly    OMEPRAZOLE (PRILOSEC) 20 MG DELAYED RELEASE CAPSULE    Take 20 mg by mouth nightly     TOPIRAMATE (TOPAMAX PO)    Take 150 mg by mouth nightly        ALLERGIES     Zoloft [sertraline]    FAMILY HISTORY     History reviewed. No pertinent family history.        SOCIAL HISTORY       Social History     Socioeconomic History    Marital status:      Spouse name: None    Number of children: None    Years of education: None    Highest education level: None   Occupational History    None   Social Needs    Financial resource strain: None    Food insecurity     Worry: None     Inability: None    Transportation needs     Medical: None     Non-medical: None   Tobacco Use    Smoking status: Never Smoker    Smokeless tobacco: Never Used   Substance and Sexual Activity    Alcohol use: No     Comment: rare    Drug use: No    Sexual activity: Yes   Lifestyle    Physical activity     Days per week: None     Minutes per session: None    Stress: None   Relationships    Social connections     Talks on phone: None     Gets together: None     Attends [employer] service: None     Active member of club or organization: None     Attends meetings of clubs or organizations: None     Relationship status: None    Intimate partner violence     Fear of current or ex partner: None     Emotionally abused: None     Physically abused: None     Forced sexual activity: None   Other Topics Concern    None   Social History Narrative    None         PHYSICAL EXAM    (up to 7 forlevel 4, 8 or more for level 5)     ED Triage Vitals   BP Temp Temp src Pulse Resp SpO2 Height Weight   -- -- -- -- -- -- -- --       Physical Exam  General :Patient is awake, alert, oriented, in no acute distress, nontoxic appearing  HEENT: Pupils are equally round and reactive to light, EOMI. Cardiac: Heart regular rate, rhythm, no murmurs, rubs, or gallops  Lungs: Lungs are clear to auscultation, there is no wheezing, rhonchi, or rales. Abdomen:Abdomen is soft, nontender, nondistended. Musculoskeletal: Ambulatory  Back: No midline or bony tenderness. Dermatology: Skin is warm and dry  Psych: Mentation is grossly normal, cognition is grossly normal. Affect is appropriate. DIAGNOSTIC RESULTS       RADIOLOGY:   Non-plain film images such as CT, Ultrasoundand MRI are read by the radiologist. Plain radiographic images are visualized and preliminarily interpreted by the emergency physician with the below findings:      [] Radiologist's Report Reviewed:  No orders to display         ED BEDSIDE ULTRASOUND:   Performed by ED Physician - none    LABS:  Labs Reviewed - No data to display    I have reviewed and interpreted all of the currently available lab resultsfrom this visit (if applicable):  No results found for this visit on 03/08/21. All other labs were within normal range or not returned as of this dictation. EMERGENCY DEPARTMENT COURSE and DIFFERENTIAL DIAGNOSIS/MDM:   Vitals:    Vitals:    03/08/21 2024   BP: (!) 118/51   Pulse: 86   Resp: 16   Temp: 98.2 °F (36.8 °C)   TempSrc: Oral   SpO2: 100%   Weight: 150 lb (68 kg)   Height: 5' 7\" (1.702 m)         Patient drove herself to the ER but agrees to call for a ride. Patient's headache has decreased from a 10 to a 2 in severity. She is requesting discharge home. The patient will follow-up with their PCP in 1-2 days for reevaluation. If the patient or family members have any further concerns or any worsening symptoms they will return to the ED for reevaluation.          CONSULTS:  None    PROCEDURES:  Procedures    CRITICAL CARE TIME    Total Critical Care time was 0 minutes, excluding separately reportable procedures. There was a high probability of clinically significant/life threatening deterioration in the patient's condition which required my urgent intervention. FINAL IMPRESSION      1. Migraine without status migrainosus, not intractable, unspecified migraine type          DISPOSITION/PLAN   DISPOSITION Decision To Discharge 03/08/2021 09:29:44 PM      PATIENT REFERRED TO:  JESSICA Campos CNP  [address] [place] [id number]  [phone]    Schedule an appointment as soon as possible for a visit in 2 days  As needed      DISCHARGE MEDICATIONS:  New Prescriptions    No medications on file       Comment: Please note this report has been produced using speech recognition software and may contain errors related tothat system including errors in grammar, punctuation, and spelling, as well as words and phrases that may be inappropriate. If there are any questions or concerns please feel free to contact the dictating provider forclarification.     Rajiv Hernandez MD  Attending Emergency Physician                  Rajiv Hernandez MD  03/08/21 8526

## 2021-03-09 NOTE — CARE COORDINATION
Patient contacted regarding recent discharge and COVID-19 risk. Discussed COVID-19 related testing which was not done at this time. Test results were not done. Patient informed of results, if available? Not tested     Care Transition Nurse/ Ambulatory Care Manager contacted the patient by telephone to perform post discharge assessment. Verified name and  with patient as identifiers. Patient has following risk factors of: no known risk factors. CTN/ACM reviewed discharge instructions, medical action plan and red flags related to discharge diagnosis. Reviewed and educated them on any new and changed medications related to discharge diagnosis. Advised obtaining a 90-day supply of all daily and as-needed medications. Shawanda Clark went to the ER for a migraine. She takes routine prevention medications and reviewed her meds during the call. She does not have a new onset HA med that she typically takes. Pt stated her PCP has discussed possible referral to a neurologist and she is interested in further discussion. ACM offered to schedule patient's follow up appt but she declined, stated she will call herself. ACM encouraged patient to continue good COVID exposure precautions as below. Also suggested that patient may contact her local health department to register for a vaccination when that is available in her area. She may also access [email or web address] or [facility] pharmacies online to check for appts for her age group. Pt voiced understanding and thanked this ACM. She agreed to f/u call in the next 2 weeks. Education provided regarding infection prevention, and signs and symptoms of COVID-19 and when to seek medical attention with patient who verbalized understanding. Discussed exposure protocols and quarantine from [address] you at higher risk for severe illness  and given an opportunity for questions and concerns.  The patient agrees to contact the COVID-19 hotline [phone] or PCP office for questions related to their healthcare. CTN/ACM provided contact information for future reference. From CDC: Are you at higher risk for severe illness?  Wash your hands often.  Avoid close contact (6 feet, which is about two arm lengths) with people who are sick.  Put distance between yourself and other people if COVID-19 is spreading in your community.  Clean and disinfect frequently touched surfaces.  Avoid all cruise travel and non-essential air travel.  Call your healthcare professional if you have concerns about COVID-19 and your underlying condition or if you are sick. For more information on steps you can take to protect yourself, see CDC's How to [place] for follow-up call in 7-14 days based on severity of symptoms and risk factors.

## 2021-03-09 NOTE — ED NOTES
Patient with c/o migraine since Friday. Patient is on topiramate every night.      Lyndon Rodriguez RN  03/08/21 0984

## 2021-03-09 NOTE — ED NOTES
Pt. Left amb. and in [address] at [address] [place] [address] Dr. Díaz c/o at d'c from ERASMO Weber RN  03/08/21 8391

## 2021-03-09 NOTE — ED NOTES
States pain is better and down to a 2 and she is ready to go home.      Jeanine Marcial, RN  03/08/21 4139

## 2021-03-23 ENCOUNTER — CARE COORDINATION (OUTPATIENT)
Dept: CARE COORDINATION | Age: 26
End: 2021-03-23

## 2021-03-23 NOTE — CARE COORDINATION
Your Patient resolved from the Care Transitions episode on 3/23/21. Discussed COVID-19 related testing which was not done at this time. Test results were not done. Patient informed of results, if available? Not tested    Patient/family has been provided the following resources and education related to COVID-19:                         Signs, symptoms and red flags related to COVID-19            [facility] exposure and quarantine guidelines            [facility] exposure contact - [phone]            Contact for their local Department of Health                 Patient currently reports that the following symptoms have improved:  no new/worsening symptoms   Patient has not scheduled f/u with her PCP, stated she will do so and declined my offer to schedule. Pt has CT of head ordered and stated that should be scheduled soon. She has not returned to the ER for further severe HA. Pt has all of her regular medications. She has no other concerns at this time. No further outreach scheduled with this CTN/ACM. Episode of Care resolved. Patient has this CTN/ACM contact information if future needs arise.

## 2021-04-12 ENCOUNTER — HOSPITAL ENCOUNTER (OUTPATIENT)
Dept: ULTRASOUND IMAGING | Facility: HOSPITAL | Age: 26
Discharge: HOME | End: 2021-04-12
Payer: MEDICAID

## 2021-04-12 DIAGNOSIS — N64.4 MASTODYNIA: ICD-10-CM

## 2021-04-12 PROCEDURE — 76642 ULTRASOUND BREAST LIMITED: CPT

## 2021-04-13 NOTE — PROGRESS NOTES
Patient: Ashish Wilkerson    YOB: 1995    Date: 2021    Primary Care Provider: Mickie Hernandez    Chief Complaint   Patient presents with   • Mass     right breast       Subjective .     History of present illness:  I saw the patient in the office  today for evaluation and treatment of abnormal breast imaging that show BI-RADS 4. Patient states she has pain in right breast for for several months but pain has gotten worse in the last week.  Patient has increased pain at the beginning of her menstrual cycle.  Ultrasound indicates a BI-RADS 4 cyst in no other abnormalities.  No family history of breast cancer.  No previous breast biopsy.    The following portions of the patient's history were reviewed and updated as appropriate: allergies, current medications, past family history, past medical history, past social history, past surgical history and problem list.    Review of Systems    History:  Past Medical History:   Diagnosis Date   • Anxiety    • Migraine           Past Surgical History:   Procedure Laterality Date   •  SECTION     • DENTAL PROCEDURE     • DIAGNOSTIC LAPAROSCOPY N/A 2017    Procedure: DIAGNOSTIC LAPAROSCOPY;  Surgeon: Dandy Shelby MD;  Location: [facility];  Service:        Family History   Problem Relation Age of Onset   • Cancer Mother    • Cystic fibrosis Mother    • Lupus Mother        Social History     Tobacco Use   • Smoking status: Never Smoker   • Smokeless tobacco: Never Used   Substance Use Topics   • Alcohol use: No   • Drug use: No       Allergies:  Allergies   Allergen Reactions   • Zoloft [Sertraline Hcl] Other (See Comments)     Migraines        Medications:     Current Outpatient Medications:   •  busPIRone (BUSPAR) 15 MG tablet, 3 (Three) Times a Day., Disp: , Rfl:   •  traZODone (DESYREL) 50 MG tablet, Every Night., Disp: , Rfl:   •  cetirizine (zyrTEC) 10 MG tablet, Take 10 mg by mouth., Disp: , Rfl:   •  fexofenadine (ALLEGRA) 180  "MG tablet, Take 1 tablet by mouth Daily., Disp: 30 tablet, Rfl: 5  •  flunisolide (NASALIDE) 25 MCG/ACT (0.025%) solution nasal spray, Inhale 2 sprays Every 12 (Twelve) Hours., Disp: 1 bottle, Rfl: 5  •  FLUoxetine HCl (PROZAC PO), Take 1 tablet by mouth Daily., Disp: , Rfl:   •  Fluticasone Furoate-Vilanterol (Breo Ellipta) 100-25 MCG/INH inhaler, Inhale 1 puff Daily., Disp: 1 inhaler, Rfl: 5  •  hydrOXYzine (ATARAX) 25 MG tablet, 50 mg Every 4 (Four) Hours As Needed., Disp: , Rfl:   •  hydrOXYzine (ATARAX) 50 MG tablet, , Disp: , Rfl:   •   MG tablet, , Disp: , Rfl:   •  lamoTRIgine (LaMICtal) 100 MG tablet, Daily., Disp: , Rfl:   •  meloxicam (MOBIC) 7.5 MG tablet, Take 7.5 mg by mouth Daily., Disp: , Rfl:   •  OLANZapine (ZYPREXA PO), Take 1 tablet by mouth Every Night., Disp: , Rfl:   •  omeprazole (priLOSEC) 40 MG capsule, Take 1 capsule by mouth Daily., Disp: 30 capsule, Rfl: 5  •  topiramate (TOPAMAX) 100 MG tablet, Daily., Disp: , Rfl:   •  topiramate (TOPAMAX) 100 MG tablet, Take 150 mg by mouth., Disp: , Rfl:     Objective     Vital Signs:   Vitals:    04/14/21 1030   BP: 112/70   Pulse: 100   Temp: 96.9 °F (36.1 °C)   SpO2: 98%   Weight: 70.8 kg (156 lb)   Height: 170.2 cm (67.01\")       Physical Exam:     General Appearance:    Alert, cooperative, in no acute distress   Head:    Normocephalic, without obvious abnormality, atraumatic   Eyes:            Lids and lashes normal, conjunctivae and sclerae normal, no   icterus, no pallor, corneas clear,   Ears:    Ears appear intact with no abnormalities noted   Throat:   No oral lesions, no thrush, oral mucosa moist   Breast:    Tenderness right breast near the areola.  No other breast masses palpated.   Lungs:     Clear to auscultation,respirations regular, even and                  Unlabored    Heart:    Regular rhythm and normal rate, no murmur, no gallop.   Chest Wall:    No abnormalities observed   Abdomen:     Normal bowel sounds, no masses, no " organomegaly, soft        non-tender, non-distended, no guarding.   Extremities:   Moves all extremities well, no edema, no cyanosis, no             redness   Pulses:   Pulses palpable and equal bilaterally   Skin:   No bleeding, bruising or rash   Lymph nodes:   No palpable adenopathy   Neurologic:   Cranial nerves 2 - 12 grossly intact.           Results Review:   I reviewed the patient's new clinical results.    Review of Systems was reviewed and confirmed as accurate as documented by the MA.    Assessment / Plan:    1. Breast mass    2. Breast pain        I did have a detailed and extensive discussion with the patient in the office today and reviewed her recent workup.  I have told the patient that we will repeat ultrasound in 6 months to ensure stability.  Patient reassured and told her nothing needs to be biopsied at this time.    Electronically signed by Hope Gomez MD  04/14/21  10:58 EDT

## 2021-04-14 ENCOUNTER — OFFICE VISIT (OUTPATIENT)
Dept: SURGERY | Facility: CLINIC | Age: 26
End: 2021-04-14

## 2021-04-14 ENCOUNTER — TRANSCRIBE ORDERS (OUTPATIENT)
Dept: ADMINISTRATIVE | Facility: HOSPITAL | Age: 26
End: 2021-04-14

## 2021-04-14 VITALS
HEART RATE: 100 BPM | BODY MASS INDEX: 24.48 KG/M2 | SYSTOLIC BLOOD PRESSURE: 112 MMHG | DIASTOLIC BLOOD PRESSURE: 70 MMHG | OXYGEN SATURATION: 98 % | HEIGHT: 67 IN | TEMPERATURE: 96.9 F | WEIGHT: 156 LBS

## 2021-04-14 DIAGNOSIS — N64.4 BREAST PAIN: ICD-10-CM

## 2021-04-14 DIAGNOSIS — N63.0 BREAST MASS: Primary | ICD-10-CM

## 2021-04-14 PROCEDURE — 99203 OFFICE O/P NEW LOW 30 MIN: CPT | Performed by: SURGERY

## 2021-04-14 RX ORDER — TOPIRAMATE 100 MG/1
150 TABLET, FILM COATED ORAL
COMMUNITY

## 2021-04-14 RX ORDER — CETIRIZINE HYDROCHLORIDE 10 MG/1
10 TABLET ORAL
COMMUNITY
End: 2021-10-01

## 2021-04-23 ENCOUNTER — HOSPITAL ENCOUNTER (EMERGENCY)
Facility: HOSPITAL | Age: 26
Discharge: HOME | End: 2021-04-23
Attending: HOSPITALIST
Payer: MEDICAID

## 2021-04-23 ENCOUNTER — APPOINTMENT (OUTPATIENT)
Dept: GENERAL RADIOLOGY | Facility: HOSPITAL | Age: 26
End: 2021-04-23
Payer: MEDICAID

## 2021-04-23 VITALS
HEIGHT: 67 IN | SYSTOLIC BLOOD PRESSURE: 97 MMHG | HEART RATE: 78 BPM | RESPIRATION RATE: 18 BRPM | OXYGEN SATURATION: 100 % | BODY MASS INDEX: 23.86 KG/M2 | WEIGHT: 152 LBS | DIASTOLIC BLOOD PRESSURE: 51 MMHG | TEMPERATURE: 98.2 F

## 2021-04-23 DIAGNOSIS — R07.89 MUSCULOSKELETAL CHEST PAIN: Primary | ICD-10-CM

## 2021-04-23 DIAGNOSIS — T50.Z95A VACCINATION REACTION, INITIAL ENCOUNTER: ICD-10-CM

## 2021-04-23 LAB
A/G RATIO: 1.6 (ref 0.8–2)
ALBUMIN SERPL-MCNC: 4.1 G/DL (ref 3.4–4.8)
ALP BLD-CCNC: 69 U/L (ref 25–100)
ALT SERPL-CCNC: 6 U/L (ref 4–36)
ANION GAP SERPL CALCULATED.3IONS-SCNC: 9 MMOL/L (ref 3–16)
AST SERPL-CCNC: 11 U/L (ref 8–33)
BASOPHILS ABSOLUTE: 0 K/UL (ref 0–0.1)
BASOPHILS RELATIVE PERCENT: 0.6 %
BILIRUB SERPL-MCNC: 0.6 MG/DL (ref 0.3–1.2)
BUN BLDV-MCNC: 8 MG/DL (ref 6–20)
CALCIUM SERPL-MCNC: 8.8 MG/DL (ref 8.5–10.5)
CHLORIDE BLD-SCNC: 108 MMOL/L (ref 98–107)
CO2: 21 MMOL/L (ref 20–30)
CREAT SERPL-MCNC: 1 MG/DL (ref 0.4–1.2)
EOSINOPHILS ABSOLUTE: 0 K/UL (ref 0–0.4)
EOSINOPHILS RELATIVE PERCENT: 0.6 %
GFR AFRICAN AMERICAN: >59
GFR NON-AFRICAN AMERICAN: >60
GLOBULIN: 2.5 G/DL
GLUCOSE BLD-MCNC: 90 MG/DL (ref 74–106)
HCT VFR BLD CALC: 40.3 % (ref 37–47)
HEMOGLOBIN: 13.2 G/DL (ref 11.5–16.5)
IMMATURE GRANULOCYTES #: 0 K/UL
IMMATURE GRANULOCYTES %: 0.2 % (ref 0–5)
LYMPHOCYTES ABSOLUTE: 0.8 K/UL (ref 1.5–4)
LYMPHOCYTES RELATIVE PERCENT: 12.8 %
MAGNESIUM: 1.8 MG/DL (ref 1.7–2.4)
MCH RBC QN AUTO: 28.7 PG (ref 27–32)
MCHC RBC AUTO-ENTMCNC: 32.8 G/DL (ref 31–35)
MCV RBC AUTO: 87.6 FL (ref 80–100)
MONOCYTES ABSOLUTE: 0.3 K/UL (ref 0.2–0.8)
MONOCYTES RELATIVE PERCENT: 5.1 %
NEUTROPHILS ABSOLUTE: 5.2 K/UL (ref 2–7.5)
NEUTROPHILS RELATIVE PERCENT: 80.7 %
PDW BLD-RTO: 12.4 % (ref 11–16)
PLATELET # BLD: 199 K/UL (ref 150–400)
PMV BLD AUTO: 11.7 FL (ref 6–10)
POTASSIUM REFLEX MAGNESIUM: 3.4 MMOL/L (ref 3.4–5.1)
RBC # BLD: 4.6 M/UL (ref 3.8–5.8)
SODIUM BLD-SCNC: 138 MMOL/L (ref 136–145)
TOTAL PROTEIN: 6.6 G/DL (ref 6.4–8.3)
TROPONIN: <0.3 NG/ML
WBC # BLD: 6.4 K/UL (ref 4–11)

## 2021-04-23 PROCEDURE — 80053 COMPREHEN METABOLIC PANEL: CPT

## 2021-04-23 PROCEDURE — 6360000002 HC RX W HCPCS: Performed by: HOSPITALIST

## 2021-04-23 PROCEDURE — 99283 EMERGENCY DEPT VISIT LOW MDM: CPT

## 2021-04-23 PROCEDURE — 36415 COLL VENOUS BLD VENIPUNCTURE: CPT

## 2021-04-23 PROCEDURE — 85025 COMPLETE CBC W/AUTO DIFF WBC: CPT

## 2021-04-23 PROCEDURE — 2580000003 HC RX 258: Performed by: HOSPITALIST

## 2021-04-23 PROCEDURE — 84484 ASSAY OF TROPONIN QUANT: CPT

## 2021-04-23 PROCEDURE — 71045 X-RAY EXAM CHEST 1 VIEW: CPT

## 2021-04-23 PROCEDURE — 96374 THER/PROPH/DIAG INJ IV PUSH: CPT

## 2021-04-23 PROCEDURE — 83735 ASSAY OF MAGNESIUM: CPT

## 2021-04-23 RX ORDER — 0.9 % SODIUM CHLORIDE 0.9 %
1000 INTRAVENOUS SOLUTION INTRAVENOUS ONCE
Status: COMPLETED | OUTPATIENT
Start: 2021-04-23 | End: 2021-04-23

## 2021-04-23 RX ORDER — KETOROLAC TROMETHAMINE 30 MG/ML
30 INJECTION, SOLUTION INTRAMUSCULAR; INTRAVENOUS ONCE
Status: COMPLETED | OUTPATIENT
Start: 2021-04-23 | End: 2021-04-23

## 2021-04-23 RX ADMIN — KETOROLAC TROMETHAMINE 30 MG: 30 INJECTION, SOLUTION INTRAMUSCULAR at 09:11

## 2021-04-23 RX ADMIN — SODIUM CHLORIDE 1000 ML: 9 INJECTION, SOLUTION INTRAVENOUS at 09:11

## 2021-04-23 ASSESSMENT — PAIN SCALES - GENERAL: PAINLEVEL_OUTOF10: 7

## 2021-04-23 ASSESSMENT — HEART SCORE: ECG: 0

## 2021-04-23 ASSESSMENT — PAIN DESCRIPTION - PAIN TYPE: TYPE: ACUTE PAIN

## 2021-04-23 ASSESSMENT — PAIN DESCRIPTION - ORIENTATION: ORIENTATION: LEFT

## 2021-04-23 ASSESSMENT — PAIN DESCRIPTION - FREQUENCY: FREQUENCY: CONTINUOUS

## 2021-04-23 ASSESSMENT — PAIN DESCRIPTION - LOCATION
LOCATION: CHEST
LOCATION: CHEST

## 2021-04-23 NOTE — ED NOTES
AVS reviewed with patient, understanding verbalized. Patient discharged home with no further needs or concerns voiced. pcp follow up recommended.      Lady Gamino RN  04/23/21 3093

## 2021-04-23 NOTE — ED PROVIDER NOTES
[address] [facility] ENCOUNTER      Pt Name: Akua Urbina  MRN: [id number]  YOB: 1995  Date of evaluation: 4/23/2021  Provider: Zaina Ibanez DO    CHIEF COMPLAINT     No chief complaint on file. HISTORY OF PRESENT ILLNESS  (Location/Symptom, Timing/Onset, Context/Setting, Quality, Duration, Modifying Factors, Severity.)   Akua Urbina is a 27 y.o. female who presents to the emergency department chest pain. Patient advised that she got her second COVID-19 shot around 3 PM yesterday. Advises that she was asleep last night and was awoken from sleep at around midnight with chest discomfort. She states that it was hurting in the center aspect and going around on her left breast.  She advises that the chest pain is greatly improved this morning but she states that she just feels a little sore to that area now. She describes it more as an achy sensation is worse with deep breath. She denies any cough with the symptoms. She states that she felt like she might have been a little short of breath but she was not sure. Patient states that she checked her temperature but did not have a fever but she did have chills. Advised that she had to get multiple comforters and put a sweatshirt on to keep her from \"shaking\". Patient denies any muscle aches or body aches out of ordinary except for the just the chest discomfort. Denies any tenderness to palpation but she states that movement of the chest does cause the symptoms to slightly worsen. She states she did have some mild nausea but denies any vomiting. This is actually resolved. Denies any abdominal pain out of ordinary. Denies any dysuria. Denies any swelling out of ordinary. Denies any sick contacts that she is aware of. Denies any concern for COVID-19. Denies any numbness tingling weakness of the extremities out of ordinary. Denies any headache out of the ordinary.   Denies any visual changes. Nursing notes were reviewed. REVIEW OFSYSTEMS    (2-9 systems for level 4, 10 or more for level 5)   ROS:  General:  No fevers, no chills, no weakness  Cardiovascular:  + chest pain-resolved, no palpitations  Respiratory:  +shortness of breath-resolved, no cough, no wheezing  Gastrointestinal:  No pain, no nausea, no vomiting, no diarrhea  Musculoskeletal:  No muscle pain, no joint pain  Skin:  No rash, no easy bruising  Neurologic:  No speech problems, no headache, no extremity weakness  Psychiatric:  No anxiety  Genitourinary:  No dysuria, no hematuria    Except as noted above the remainder of the review of systems was reviewed and negative. PAST MEDICAL HISTORY     Past Medical History:   Diagnosis Date    Anxiety     Depression     Fibromyalgia     Migraine     Miscarriage     Ovarian cyst 2017    ruptured    Strep pharyngitis          SURGICAL HISTORY       Past Surgical History:   Procedure Laterality Date    ABDOMEN SURGERY      cleaned out drainage from an ovarian cyst rupture     SECTION      DENTAL SURGERY           CURRENT MEDICATIONS       Previous Medications    BUSPIRONE (BUSPAR) 10 MG TABLET    Take 15 mg by mouth 3 times daily     CETIRIZINE (ZYRTEC) 10 MG TABLET    Take 10 mg by mouth nightly     HYDROXYZINE (ATARAX) 50 MG TABLET    Take 50 mg by mouth every 6 hours as needed for Itching    LAMOTRIGINE (LAMICTAL) 100 MG TABLET    Take 100 mg by mouth nightly    NORGESTREL-ETHINYL ESTRADIOL (LOW-OGESTREL) 0.3-30 MG-MCG PER TABLET    Take 1 tablet by mouth daily    OMEPRAZOLE (PRILOSEC) 20 MG DELAYED RELEASE CAPSULE    Take 20 mg by mouth nightly     TOPIRAMATE (TOPAMAX PO)    Take 150 mg by mouth nightly     TRAZODONE (DESYREL) 50 MG TABLET    Take 50 mg by mouth nightly 1-2 nightly       ALLERGIES     Zoloft [sertraline]    FAMILY HISTORY     History reviewed. No pertinent family history.        SOCIAL HISTORY       Social History Socioeconomic History    Marital status:      Spouse name: None    Number of children: None    Years of education: None    Highest education level: None   Occupational History    None   Social Needs    Financial resource strain: None    Food insecurity     Worry: None     Inability: None    Transportation needs     Medical: None     Non-medical: None   Tobacco Use    Smoking status: Never Smoker    Smokeless tobacco: Never Used   Substance and Sexual Activity    Alcohol use: No     Comment: rare    Drug use: No    Sexual activity: Yes   Lifestyle    Physical activity     Days per week: None     Minutes per session: None    Stress: None   Relationships    Social connections     Talks on phone: None     Gets together: None     Attends Zoroastrianism service: None     Active member of club or organization: None     Attends meetings of clubs or organizations: None     Relationship status: None    Intimate partner violence     Fear of current or ex partner: None     Emotionally abused: None     Physically abused: None     Forced sexual activity: None   Other Topics Concern    None   Social History Narrative    None         PHYSICAL EXAM    (up to 7 for level 4, 8 or more for level 5)     ED Triage Vitals   BP Temp Temp src Pulse Resp SpO2 Height Weight   -- -- -- -- -- -- -- --       Physical Exam  General :Patient is awake, alert, oriented, in no acute distress, nontoxic appearing  HEENT: Pupils are equally round and reactive to light, EOMI, conjunctivae clear. Oral mucosa is moist, no exudate. Uvula is midline  Cardiac: Heart regular rate, rhythm, no murmurs, rubs, or gallops  Lungs: Lungs are clear to auscultation, there is no wheezing, rhonchi, or rales. There is no use of accessory muscles. Chest wall: There is no tenderness to palpation over the chest wall or over ribs  Abdomen: Abdomen is soft, nontender, nondistended.  There is no firm or pulsatile masses, no rebound rigidity or guarding. Musculoskeletal: 5 out of 5 strength in all 4 extremities. No focal muscle deficits are appreciated  Neuro: Motor intact, sensory intact, level of consciousness is normal  Dermatology: Skin is warm and dry  Psych: Mentation is grossly normal, cognition is grossly normal. Affect is appropriate. DIAGNOSTIC RESULTS     EKG: All EKG's are interpreted by the Emergency Department Physician who either signs or Co-signs this chart in the [address] a cardiologist.    The EKG interpreted by me shows sinus rhythm. Ventricular rate 76 bpm, VA interval is 152 ms, QRS duration 78 ms, QT/QTc interval is 358/403 ms. No acute T wave inversions concerning for acute myocardial ischemia. No ST elevations concerning for acute myocardial infarction. RADIOLOGY:   Non-plain film images such as CT, Ultrasound and MRI are read by the radiologist. Plain radiographic images are visualized and preliminarily interpreted by the emergency physician with the below findings:      ? Radiologist's Report Reviewed:  XR CHEST PORTABLE   Final Result   1. No acute cardiopulmonary abnormalities.             ED BEDSIDE ULTRASOUND:   Performed by ED Physician - none    LABS:    I have reviewed and interpreted all of the currently available lab results from this visit (ifapplicable):  Results for orders placed or performed during the hospital encounter of 04/23/21   CBC Auto Differential   Result Value Ref Range    WBC 6.4 4.0 - 11.0 K/uL    RBC 4.60 3.80 - 5.80 M/uL    Hemoglobin 13.2 11.5 - 16.5 g/dL    Hematocrit 40.3 37.0 - 47.0 %    MCV 87.6 80.0 - 100.0 fL    MCH 28.7 27.0 - 32.0 pg    MCHC 32.8 31.0 - 35.0 g/dL    RDW 12.4 11.0 - 16.0 %    Platelets 611 514 - 187 K/uL    MPV 11.7 (H) 6.0 - 10.0 fL    Neutrophils % 80.7 %    Immature Granulocytes % 0.2 0.0 - 5.0 %    Lymphocytes % 12.8 %    Monocytes % 5.1 %    Eosinophils % 0.6 %    Basophils % 0.6 %    Neutrophils Absolute 5.2 2.0 - 7.5 K/uL    Immature Granulocytes # 0.0 K/uL musculoskeletal.  Advised that her pulse ox is 100% on room air and she is not tachycardic so the likelihood of this being a pulmonary embolism is statistically very low especially in a 27year-old without risk factors so we believe it is okay not to check a D-dimer on this patient since she is essentially statistically is 0 for the risk of this. Patient does state her understanding of this. She has had no cough. Her shortness of breath is completely resolved but she states that she believes it is more secondary to the just the discomfort in her chest that was causing her symptoms. We will give her a dose of Toradol 30 mg to see if this helps with her musculoskeletal discomfort. Should be given a fluid bolus of normal saline. Patient will also have portable chest radiograph performed. We will check a CBC, CMP, troponin and a twelve-lead EKG. I do not believe the patient would require a 3-hour troponin for rule out since her chest pain or discomfort started around midnight last night and had been present all way till this morning without any relief until prior to arrival which puts her over 9hours does seem more musculoskeletal in nature than actual cardiac. Patient's heart score is low risk at less than 3. Patient is resting comfortably stretcher no acute distress nontoxic-appearing. I do believe the patient's symptoms most likely secondary to her second Moderna COVID-19 vaccination. Blood work showed white count 6400, hemoglobin is 13.2, hematocrit 40.3, platelet counts 672. Comprehensive metabolic panel was benign except for chloride of 108 which is just one-point high. Troponin was nondetectable less than 0.30. Magnesium was 1.8 within the normal range. Portable chest radiograph read by radiology as no acute cardiopulmonary abnormalities. Patient's radiological diagnostic studies were discussed with her she does state her understanding. Patient states that she is feeling better here.   She is sitting up in the bed no acute distress nontoxic-appearing I discussed the findings with her which essentially benign and she does agree that this is most likely just symptoms is secondary to the second Covid vaccine that she got yesterday. Advised that the symptoms can last up to 2 to 3 days. Advised her to drink plenty of fluids and take anti-inflammatory medication such as Motrin for body aches or sensations of fevers chills. Otherwise the patient be discharged home in stable condition. Patient was offered prescription for nausea medication but declined. The patient advised that she does need to follow-up with her regular family physician within the next 1 to 2 days reevaluation. Patient was also given instructions if her symptoms worsens or new symptoms arise she should return back to emergency department for further evaluation work-up. CONSULTS:  None    PROCEDURES:  Procedures    CRITICAL CARE TIME    Total Critical Care time was 0 minutes, excluding separately reportable procedures. There was a high probability of clinically significant/life threatening deterioration in the patient's condition which required my urgent intervention. FINAL IMPRESSION      1. Musculoskeletal chest pain    2. Vaccination reaction, initial encounter          DISPOSITION/PLAN   DISPOSITION        PATIENT REFERRED TO:  JESSICA Dorsey - [address]  [phone]    In 2 days      [facility] Emergency Department  [facility] [address] [facility]  [phone]    As needed, If symptoms worsen      DISCHARGE MEDICATIONS:  New Prescriptions    No medications on file       Comment: Please note this report has been produced using speech recognition software and may contain errorsrelated to that system including errors in grammar, punctuation, and spelling, as well as words and phrases that may be inappropriate.  If there are any questions or concerns please feel free to contact the dictating providerfor clarification.     Francine Christie DO  Attending Emergency Physician              Francine Christie DO  04/23/21 [id number]

## 2021-04-23 NOTE — ED NOTES
Patient resting quietly, voicing no needs or concerns at this time. Stats pain to left chest wall better. Patient is updated on plan of care.      Mere Angulo RN  04/23/21 [id number]

## 2021-05-02 ENCOUNTER — APPOINTMENT (OUTPATIENT)
Dept: PREADMISSION TESTING | Facility: HOSPITAL | Age: 26
End: 2021-05-02

## 2021-05-02 PROCEDURE — U0004 COV-19 TEST NON-CDC HGH THRU: HCPCS

## 2021-05-02 PROCEDURE — C9803 HOPD COVID-19 SPEC COLLECT: HCPCS

## 2021-05-03 LAB — SARS-COV-2 RNA NOSE QL NAA+PROBE: NOT DETECTED

## 2021-05-05 ENCOUNTER — LAB REQUISITION (OUTPATIENT)
Dept: LAB | Facility: HOSPITAL | Age: 26
End: 2021-05-05

## 2021-05-05 DIAGNOSIS — N63.15 UNSPECIFIED LUMP IN THE RIGHT BREAST, OVERLAPPING QUADRANTS: ICD-10-CM

## 2021-05-05 PROCEDURE — 88305 TISSUE EXAM BY PATHOLOGIST: CPT | Performed by: SURGERY

## 2021-05-06 LAB
CYTO UR: NORMAL
LAB AP CASE REPORT: NORMAL
LAB AP CLINICAL INFORMATION: NORMAL
PATH REPORT.FINAL DX SPEC: NORMAL
PATH REPORT.GROSS SPEC: NORMAL

## 2021-05-18 ENCOUNTER — HOSPITAL ENCOUNTER (OUTPATIENT)
Dept: ULTRASOUND IMAGING | Facility: HOSPITAL | Age: 26
Discharge: HOME | End: 2021-05-18
Payer: MEDICAID

## 2021-05-18 DIAGNOSIS — R10.10 PAIN OF UPPER ABDOMEN: ICD-10-CM

## 2021-05-18 PROCEDURE — 76700 US EXAM ABDOM COMPLETE: CPT

## 2021-05-22 ENCOUNTER — OFFICE VISIT (OUTPATIENT)
Dept: PRIMARY CARE CLINIC | Age: 26
End: 2021-05-22
Payer: MEDICAID

## 2021-05-22 VITALS
SYSTOLIC BLOOD PRESSURE: 113 MMHG | OXYGEN SATURATION: 98 % | RESPIRATION RATE: 18 BRPM | DIASTOLIC BLOOD PRESSURE: 69 MMHG | HEART RATE: 83 BPM | TEMPERATURE: 98.8 F

## 2021-05-22 DIAGNOSIS — N18.9 CHRONIC KIDNEY DISEASE, UNSPECIFIED CKD STAGE: ICD-10-CM

## 2021-05-22 DIAGNOSIS — M62.830 BACK MUSCLE SPASM: Primary | ICD-10-CM

## 2021-05-22 PROCEDURE — 99202 OFFICE O/P NEW SF 15 MIN: CPT | Performed by: PHYSICIAN ASSISTANT

## 2021-05-22 RX ORDER — FLUNISOLIDE 0.25 MG/ML
2 SOLUTION NASAL EVERY 12 HOURS
COMMUNITY
Start: 2020-12-03 | End: 2022-06-21

## 2021-05-22 RX ORDER — FEXOFENADINE HCL 180 MG/1
180 TABLET ORAL DAILY
COMMUNITY
Start: 2021-01-28 | End: 2022-06-21

## 2021-05-22 RX ORDER — METHYLPREDNISOLONE SODIUM SUCCINATE 125 MG/2ML
125 INJECTION, POWDER, LYOPHILIZED, FOR SOLUTION INTRAMUSCULAR; INTRAVENOUS ONCE
Status: COMPLETED | OUTPATIENT
Start: 2021-05-22 | End: 2021-05-22

## 2021-05-22 RX ORDER — TIZANIDINE 4 MG/1
4 TABLET ORAL 4 TIMES DAILY PRN
Qty: 40 TABLET | Refills: 0 | Status: SHIPPED | OUTPATIENT
Start: 2021-05-22 | End: 2022-06-21

## 2021-05-22 RX ORDER — BUSPIRONE HYDROCHLORIDE 15 MG/1
15 TABLET ORAL 3 TIMES DAILY
COMMUNITY
Start: 2020-10-02 | End: 2022-10-29

## 2021-05-22 RX ORDER — OMEPRAZOLE 40 MG/1
40 CAPSULE, DELAYED RELEASE ORAL DAILY
COMMUNITY
Start: 2020-10-30 | End: 2022-10-29

## 2021-05-22 RX ORDER — PREDNISONE 20 MG/1
40 TABLET ORAL DAILY
Qty: 20 TABLET | Refills: 0 | Status: SHIPPED | OUTPATIENT
Start: 2021-05-22 | End: 2021-06-01

## 2021-05-22 RX ADMIN — METHYLPREDNISOLONE SODIUM SUCCINATE 125 MG: 125 INJECTION, POWDER, LYOPHILIZED, FOR SOLUTION INTRAMUSCULAR; INTRAVENOUS at 09:50

## 2021-05-22 NOTE — PROGRESS NOTES
Ruben Hall 27 y.o. presents today for   Knots/tender spots along spine for three weeks now     HPI:  Ruben Hall states painful, hard areas mid back and lower back for three weeks now. Patient wants to avoid nsaids due to hx of slightly decreased kidney function in past.    No family history on file. Social History     Socioeconomic History    Marital status:      Spouse name: Not on file    Number of children: Not on file    Years of education: Not on file    Highest education level: Not on file   Occupational History    Not on file   Tobacco Use    Smoking status: Never Smoker    Smokeless tobacco: Never Used   Vaping Use    Vaping Use: Never used   Substance and Sexual Activity    Alcohol use: No     Comment: rare    Drug use: No    Sexual activity: Yes   Other Topics Concern    Not on file   Social History Narrative    Not on file     Social Determinants of Health     Financial Resource Strain:     Difficulty of Paying Living Expenses:    Food Insecurity:     Worried About Running Out of Food in the Last Year:     [address] in the Last Year:    Transportation Needs:     Lack of Transportation (Medical):      Lack of Transportation (Non-Medical):    Physical Activity:     Days of Exercise per Week:     Minutes of Exercise per Session:    Stress:     Feeling of Stress :    Social Connections:     Frequency of Communication with Friends and Family:     Frequency of Social Gatherings with Friends and Family:     Attends Evangelical Services:     Active Member of Clubs or Organizations:     Attends Club or Organization Meetings:     Marital Status:    Intimate Partner Violence:     Fear of Current or Ex-Partner:     Emotionally Abused:     Physically Abused:     Sexually Abused:         Past Surgical History:   Procedure Laterality Date    ABDOMEN SURGERY      cleaned out drainage from an ovarian cyst rupture     SECTION      DENTAL SURGERY          Past Medical History:   Diagnosis Date    Anxiety     Depression     Fibromyalgia     Migraine     Miscarriage     Ovarian cyst 12/11/2017    ruptured    Strep pharyngitis         Current Outpatient Medications   Medication Sig Dispense Refill    fexofenadine (ALLEGRA) 180 MG tablet Take 180 mg by mouth daily      flunisolide (NASALIDE) 25 MCG/ACT (0.025%) SOLN Inhale 2 sprays into the lungs every 12 hours      busPIRone (BUSPAR) 15 MG tablet Take 15 mg by mouth 3 times daily      omeprazole (PRILOSEC) 40 MG delayed release capsule Take 40 mg by mouth daily      predniSONE (DELTASONE) 20 MG tablet Take 2 tablets by mouth daily for 10 days 20 tablet 0    tiZANidine (ZANAFLEX) 4 MG tablet Take 1 tablet by mouth 4 times daily as needed (back pain) 40 tablet 0    traZODone (DESYREL) 50 MG tablet Take 50 mg by mouth nightly 1-2 nightly      norgestrel-ethinyl estradiol (LOW-OGESTREL) 0.3-30 MG-MCG per tablet Take 1 tablet by mouth daily      hydrOXYzine (ATARAX) 50 MG tablet Take 50 mg by mouth every 6 hours as needed for Itching      lamoTRIgine (LAMICTAL) 100 MG tablet Take 100 mg by mouth nightly      cetirizine (ZYRTEC) 10 MG tablet Take 10 mg by mouth nightly       Topiramate (TOPAMAX PO) Take 150 mg by mouth nightly        No current facility-administered medications for this visit. Review of Systems   See above    /69   Pulse 83   Temp 98.8 °F (37.1 °C) (Oral)   Resp 18   SpO2 98%      Physical Exam  Constitutional:       General: She is not in acute distress. Appearance: Normal appearance. She is not ill-appearing. HENT:      Head: Normocephalic and atraumatic. Right Ear: External ear normal.      Left Ear: External ear normal.   Eyes:      Extraocular Movements: Extraocular movements intact. Conjunctiva/sclera: Conjunctivae normal.      Comments: Wearing glasses   Musculoskeletal:         General: Normal range of motion. Arms:    Neurological:      Mental Status: She is alert. Lab Results   Component Value Date     04/23/2021    K 3.4 04/23/2021     04/23/2021    CO2 21 04/23/2021    BUN 8 04/23/2021    CREATININE 1.0 04/23/2021    GLUCOSE 90 04/23/2021    CALCIUM 8.8 04/23/2021      ASSESSMENT/PLAN    1. Back muscle spasm  Avoid nsaids due to below, tylenol otc for pain and finish oral steroids and muscle relaxants  - methylPREDNISolone sodium (SOLU-MEDROL) injection 125 mg  - predniSONE (DELTASONE) 20 MG tablet; Take 2 tablets by mouth daily for 10 days  Dispense: 20 tablet; Refill: 0  - tiZANidine (ZANAFLEX) 4 MG tablet; Take 1 tablet by mouth 4 times daily as needed (back pain)  Dispense: 40 tablet; Refill: 0    2.  Chronic kidney disease, unspecified CKD stage  Stage 1-2 most likely          [place], [place]

## 2021-05-22 NOTE — PROGRESS NOTES
After obtaining consent, and per orders of LUIS F Blas, injection of Solumedrol 125mg given by Any Dennis MA. Patient instructed to remain in clinic for 20 minutes afterwards, and to report any adverse reaction to me immediately.

## 2021-06-04 ENCOUNTER — OFFICE VISIT (OUTPATIENT)
Dept: PULMONOLOGY | Facility: CLINIC | Age: 26
End: 2021-06-04

## 2021-06-04 ENCOUNTER — LAB (OUTPATIENT)
Dept: LAB | Facility: HOSPITAL | Age: 26
End: 2021-06-04

## 2021-06-04 VITALS
DIASTOLIC BLOOD PRESSURE: 70 MMHG | WEIGHT: 150.6 LBS | BODY MASS INDEX: 23.64 KG/M2 | SYSTOLIC BLOOD PRESSURE: 110 MMHG | OXYGEN SATURATION: 98 % | HEIGHT: 67 IN | HEART RATE: 76 BPM

## 2021-06-04 DIAGNOSIS — J30.1 SEASONAL ALLERGIC RHINITIS DUE TO POLLEN: ICD-10-CM

## 2021-06-04 DIAGNOSIS — K21.9 GERD WITHOUT ESOPHAGITIS: ICD-10-CM

## 2021-06-04 DIAGNOSIS — Z13.228 CYSTIC FIBROSIS SCREENING: ICD-10-CM

## 2021-06-04 DIAGNOSIS — J45.40 MODERATE PERSISTENT ASTHMA, UNSPECIFIED WHETHER COMPLICATED: Primary | ICD-10-CM

## 2021-06-04 DIAGNOSIS — R06.09 DYSPNEA ON EXERTION: ICD-10-CM

## 2021-06-04 DIAGNOSIS — J45.40 MODERATE PERSISTENT ASTHMA, UNSPECIFIED WHETHER COMPLICATED: ICD-10-CM

## 2021-06-04 PROCEDURE — 86003 ALLG SPEC IGE CRUDE XTRC EA: CPT

## 2021-06-04 PROCEDURE — 82785 ASSAY OF IGE: CPT

## 2021-06-04 PROCEDURE — 99214 OFFICE O/P EST MOD 30 MIN: CPT | Performed by: INTERNAL MEDICINE

## 2021-06-04 PROCEDURE — 81220 CFTR GENE COM VARIANTS: CPT

## 2021-06-04 PROCEDURE — 36415 COLL VENOUS BLD VENIPUNCTURE: CPT

## 2021-06-04 RX ORDER — ALBUTEROL SULFATE 90 UG/1
2 AEROSOL, METERED RESPIRATORY (INHALATION) EVERY 4 HOURS PRN
Qty: 6.7 G | Refills: 5 | Status: SHIPPED | OUTPATIENT
Start: 2021-06-04

## 2021-06-04 RX ORDER — NORGESTREL AND ETHINYL ESTRADIOL 0.3-0.03MG
KIT ORAL
COMMUNITY
Start: 2021-05-15 | End: 2021-10-01

## 2021-06-04 RX ORDER — BUDESONIDE AND FORMOTEROL FUMARATE DIHYDRATE 80; 4.5 UG/1; UG/1
2 AEROSOL RESPIRATORY (INHALATION) 2 TIMES DAILY
Qty: 1 EACH | Refills: 4 | Status: SHIPPED | OUTPATIENT
Start: 2021-06-04

## 2021-06-04 RX ORDER — TOPIRAMATE 150 MG/1
CAPSULE, EXTENDED RELEASE ORAL
COMMUNITY
Start: 2021-05-10

## 2021-06-04 RX ORDER — AZELASTINE 1 MG/ML
SPRAY, METERED NASAL
COMMUNITY
Start: 2021-05-18

## 2021-06-04 NOTE — PROGRESS NOTES
Pulmonary follow-up office Visit      Chief Complaint:    Chief Complaint   Patient presents with   • Follow-up     patient in office for follow up on asthma       Objective: Ashish Wilkerson is a 26 y.o. female who is here today for follow-up on shortness of breath and asthma.    History: Asthma diagnosed in grade school and originally on prn albuterol only.   Attempted IT, but unable to tolerate it in about 2017.    Current:  Duration: Asthma diagnosed as a child, but has been off Breo for a few months due to insurance  Severity: moderate based on symptoms  Exercise Tolerance: Moderate exertion   night Symptoms: none currently   Timing: intermittently  Exacerbating Factors: moderate exertion, weather changes, allergy seasons   relieving Factors: rest     Her mother has cystic fibrosis and she has multiple family members with cystic fibrosis.  She states she has never been tested.    No recent infections.  This was ordered at previous visit, but she never had labs done.      Subjective      Review of Systems:   Review of Systems   Constitutional: Negative for appetite change, fatigue, fever, unexpected weight gain and unexpected weight loss.        Weight loss felt to be due to Topamax   HENT: Positive for rhinorrhea. Negative for nosebleeds, postnasal drip, sinus pressure, sneezing, sore throat and voice change.    Eyes: Negative for discharge and visual disturbance.   Respiratory: Negative for cough, shortness of breath and wheezing.    Cardiovascular: Negative for chest pain, palpitations and leg swelling.   Gastrointestinal: Positive for GERD. Negative for abdominal pain, blood in stool, constipation and diarrhea.   Endocrine: Negative for cold intolerance and heat intolerance.   Genitourinary: Negative for difficulty urinating, frequency and hematuria.   Musculoskeletal: Negative for arthralgias, joint swelling and myalgias.   Skin: Negative for rash and bruise.   Allergic/Immunologic: Positive for  environmental allergies. Negative for food allergies and immunocompromised state.   Neurological: Negative for dizziness, syncope, weakness and light-headedness.   Hematological: Negative for adenopathy. Does not bruise/bleed easily.   Psychiatric/Behavioral: Positive for depressed mood. Negative for suicidal ideas. The patient is nervous/anxious.      Family History:   Family History   Problem Relation Age of Onset   • Cancer Mother    • Cystic fibrosis Mother    • Lupus Mother        Medications:     Current Outpatient Medications:   •  azelastine (ASTELIN) 0.1 % nasal spray, , Disp: , Rfl:   •  busPIRone (BUSPAR) 15 MG tablet, 3 (Three) Times a Day., Disp: , Rfl:   •  fexofenadine (ALLEGRA) 180 MG tablet, Take 1 tablet by mouth Daily., Disp: 30 tablet, Rfl: 5  •  flunisolide (NASALIDE) 25 MCG/ACT (0.025%) solution nasal spray, Inhale 2 sprays Every 12 (Twelve) Hours., Disp: 1 bottle, Rfl: 5  •  hydrOXYzine (ATARAX) 50 MG tablet, , Disp: , Rfl:   •  lamoTRIgine (LaMICtal) 100 MG tablet, Daily., Disp: , Rfl:   •  Low-Ogestrel 0.3-30 MG-MCG per tablet, , Disp: , Rfl:   •  omeprazole (priLOSEC) 40 MG capsule, Take 1 capsule by mouth Daily., Disp: 30 capsule, Rfl: 5  •  topiramate (TOPAMAX) 100 MG tablet, Daily., Disp: , Rfl:   •  traZODone (DESYREL) 50 MG tablet, Every Night., Disp: , Rfl:   •  albuterol sulfate  (90 Base) MCG/ACT inhaler, Inhale 2 puffs Every 4 (Four) Hours As Needed for Wheezing., Disp: 6.7 g, Rfl: 5  •  budesonide-formoterol (SYMBICORT) 80-4.5 MCG/ACT inhaler, Inhale 2 puffs 2 (Two) Times a Day., Disp: 1 each, Rfl: 4  •  cetirizine (zyrTEC) 10 MG tablet, Take 10 mg by mouth., Disp: , Rfl:   •  FLUoxetine HCl (PROZAC PO), Take 1 tablet by mouth Daily., Disp: , Rfl:   •  hydrOXYzine (ATARAX) 25 MG tablet, 50 mg Every 4 (Four) Hours As Needed., Disp: , Rfl:   •   MG tablet, , Disp: , Rfl:   •  meloxicam (MOBIC) 7.5 MG tablet, Take 7.5 mg by mouth Daily., Disp: , Rfl:   •  OLANZapine  "(ZYPREXA PO), Take 1 tablet by mouth Every Night., Disp: , Rfl:   •  topiramate (TOPAMAX) 100 MG tablet, Take 150 mg by mouth., Disp: , Rfl:   •  Topiramate  MG capsule extended-release 24 hour sprinkle, , Disp: , Rfl:     Allergies:   Allergies   Allergen Reactions   • Zoloft [Sertraline Hcl] Other (See Comments)     Migraines        Objective     Physical Exam:  Vital Signs:   Vitals:    06/04/21 1254   BP: 110/70   Pulse: 76   SpO2: 98%   Weight: 68.3 kg (150 lb 9.6 oz)   Height: 170.2 cm (67.01\")     Constitutional: No respiratory distress when speaking complete sentences, no cough on exam  Eyes: Extraocular movement was intact, non-icteric sclera, + fake eyelashes  ENT: Oropharynx is moist, no exudate or erythema  Neck: Supple.  No JVD noted.full range of motion   cardiovascular: S1 + S2.  Regular rhythm  Lungs/Respiratory: Clear to auscultation bilaterally, no wheezing  Abdomen/GI: Soft.  No obvious distention noted  MSK/Extremities: No significant edema noted.  No cyanosis noted. No clubbing noted  Neurologic: Awake, alert and oriented x 3, non-focal .  Psychiatric: does not appear anxious today  Skin: No rash noted on visible skin. No excessive bruising noted.      Results Review:   Labs: Reviewed.    WBC   Date Value Ref Range Status   04/23/2021 6.4 4.0 - 11.0 K/uL Final     RBC   Date Value Ref Range Status   04/23/2021 4.60 3.80 - 5.80 M/uL Final     Hemoglobin   Date Value Ref Range Status   04/23/2021 13.2 11.5 - 16.5 g/dL Final     Hematocrit   Date Value Ref Range Status   04/23/2021 40.3 37.0 - 47.0 % Final     MCV   Date Value Ref Range Status   04/23/2021 87.6 80.0 - 100.0 fL Final     MCH   Date Value Ref Range Status   04/23/2021 28.7 27.0 - 32.0 pg Final     MCHC   Date Value Ref Range Status   04/23/2021 32.8 31.0 - 35.0 g/dL Final     RDW   Date Value Ref Range Status   04/23/2021 12.4 11.0 - 16.0 % Final     RDW-SD   Date Value Ref Range Status   08/12/2019 39.2 37.0 - 54.0 fl Final "     MPV   Date Value Ref Range Status   04/23/2021 11.7 (H) 6.0 - 10.0 fL Final     Platelets   Date Value Ref Range Status   04/23/2021 199 150 - 400 K/uL Final     Neutrophil Rel %   Date Value Ref Range Status   04/23/2021 80.7 % Final     Lymphocyte Rel %   Date Value Ref Range Status   04/23/2021 12.8 % Final     Monocyte Rel %   Date Value Ref Range Status   04/23/2021 5.1 % Final     Eosinophil Rel %   Date Value Ref Range Status   04/23/2021 0.6 % Final     Basophil Rel %   Date Value Ref Range Status   04/23/2021 0.6 % Final     Immature Grans %   Date Value Ref Range Status   04/23/2021 0.2 0.0 - 5.0 % Final     Neutrophils Absolute   Date Value Ref Range Status   04/23/2021 5.2 2.0 - 7.5 K/uL Final     Lymphocytes Absolute   Date Value Ref Range Status   04/23/2021 0.8 (L) 1.5 - 4.0 K/uL Final     Monocytes Absolute   Date Value Ref Range Status   04/23/2021 0.3 0.2 - 0.8 K/uL Final     Eosinophils Absolute   Date Value Ref Range Status   04/23/2021 0.0 0.0 - 0.4 K/uL Final     Basophils Absolute   Date Value Ref Range Status   04/23/2021 0.0 0.0 - 0.1 K/uL Final     Immature Grans, Absolute   Date Value Ref Range Status   04/23/2021 0.0 K/uL Final     nRBC   Date Value Ref Range Status   08/12/2019 0.0 0.0 - 0.2 /100 WBC Final         Lab Results   Component Value Date    GLUCOSE 104 (H) 08/12/2019    BUN 8 08/12/2019    CREATININE 0.77 08/12/2019    EGFRIFNONA 94 08/12/2019    BCR 10.4 08/12/2019    K 3.9 08/12/2019    CO2 22.8 08/12/2019    CALCIUM 8.9 08/12/2019    ALBUMIN 4.20 08/12/2019    AST 13 08/12/2019    ALT 11 08/12/2019       Micro: As of June 4, 2021   No results found for: RESPCX  No results found for: BLOODCX  Lab Results   Component Value Date    URINECX Mixed Abigail Isolated 07/27/2018     No results found for: MRSACX  No results found for: MRSAPCR  Lab Results   Component Value Date    URCX Yes 07/26/2019    URCX Not Indicated 11/28/2018    URCX Yes 11/21/2018         ABG: No results  found for: PHART, DEJ6BDN, PO2ART, HGBBG, W5LXMRIM, CFIO2, FCOHB, CARBOXYHGB, FMETHB       Radiology Scans:   CT Chest Hi Resolution Diagnostic  Narrative: PROCEDURE: CT CHEST HI RESOLUTION-     HISTORY: Dyspnea chronic, first study     TECHNIQUE: Thin section axial CT using high resolution technique.   Imaging was performed duringinspiration and expiration,supine and prone     FINDINGS:     No acute lung disease is present. No bronchiectasis is present.There is  no diffuse interstitial change to indicate interstitial lung disease.     No pleural or pericardial effusion is seen. No obvious adenopathy or  mass lesion is present.     Impression: 1. No evidence of diffuse interstitial lung disease.        This study was performed with techniques to keep radiation doses as low  as reasonably achievable (ALARA). Individualized dose reduction  techniques using automated exposure control or adjustment of vA and/or  kV according to the patient size were employed.      This report was finalized on 1/27/2021 9:16 AM by Candelario Ley MD.        PFT IMPRESSION:    November 2020 PFT showed FEV1 101%, FEV1/FVC ratio 88.  No significant bronchodilator responsiveness.  No air trapping or hyperinflation.  TLC 96%.  Normal diffusing opacity.    Assessment / Plan      Assessment/Plan:    1. Moderate persistent asthma, unspecified whether complicated  Restart medications with low-dose Symbicort.  Patient did not like powder inhaler and therefore will try Symbicort at low-dose.  Discussed risk, benefits and possible side effects of medication.  Advised to rinse her mouth out after each use.  Check PFTs prior to next visit    - Regional Allergen Zone 8 / With IgE; Future  - budesonide-formoterol (SYMBICORT) 80-4.5 MCG/ACT inhaler; Inhale 2 puffs 2 (Two) Times a Day.  Dispense: 1 each; Refill: 4  - albuterol sulfate  (90 Base) MCG/ACT inhaler; Inhale 2 puffs Every 4 (Four) Hours As Needed for Wheezing.  Dispense: 6.7 g; Refill:  5  - Pulmonary Function Test; Future    2. Seasonal allergic rhinitis due to pollen  Check lab work and if positive would need to consider immunotherapy versus allergy drops.  - Regional Allergen Zone 8 / With IgE; Future    3. GERD without esophagitis  Advised to continue with current regimen    4. Dyspnea on exertion  Unclear how much of her symptoms are related to asthma versus anxiety.  At this point will restart inhalers and repeat PFTs at next visit.  If they are normal would consider trial off of inhaled corticosteroids.  - Pulmonary Function Test; Future      Follow Up:   Return in about 4 months (around 10/4/2021).    Keila Gonzalez MD  Pulmonary/Critical Care Physician   [facility]      Please note that portions of this note may have been completed with a voice recognition program. Efforts were made to edit the dictations, but occasionally words are mistranscribed.

## 2021-06-09 LAB
A ALTERNATA IGE QN: <0.1 KU/L
A FUMIGATUS IGE QN: <0.1 KU/L
AMER ROACH IGE QN: <0.1 KU/L
BAHIA GRASS IGE QN: <0.1 KU/L
BERMUDA GRASS IGE QN: <0.1 KU/L
BOXELDER IGE QN: <0.1 KU/L
C HERBARUM IGE QN: <0.1 KU/L
CAT DANDER IGE QN: <0.1 KU/L
CMN PIGWEED IGE QN: <0.1 KU/L
COMMON RAGWEED IGE QN: <0.1 KU/L
CONV CLASS DESCRIPTION: ABNORMAL
D FARINAE IGE QN: 0.76 KU/L
D PTERONYSS IGE QN: 1.2 KU/L
DOG DANDER IGE QN: <0.1 KU/L
ENGL PLANTAIN IGE QN: <0.1 KU/L
HAZELNUT POLN IGE QN: <0.1 KU/L
IGE SERPL-ACNC: 9 IU/ML (ref 6–495)
JOHNSON GRASS IGE QN: <0.1 KU/L
KENT BLUE GRASS IGE QN: <0.1 KU/L
LONDON PLANE IGE QN: <0.1 KU/L
M RACEMOSUS IGE QN: <0.1 KU/L
MT JUNIPER IGE QN: <0.1 KU/L
MUGWORT IGE QN: <0.1 KU/L
NETTLE IGE QN: <0.1 KU/L
P NOTATUM IGE QN: <0.1 KU/L
S BOTRYOSUM IGE QN: <0.1 KU/L
SHEEP SORREL IGE QN: <0.1 KU/L
SWEET GUM IGE QN: <0.1 KU/L
WHITE ELM IGE QN: <0.1 KU/L
WHITE HICKORY IGE QN: <0.1 KU/L
WHITE MULBERRY IGE QN: <0.1 KU/L
WHITE OAK IGE QN: <0.1 KU/L

## 2021-06-11 LAB
CFTR MUT ANL BLD/T: ABNORMAL
LABORATORY COMMENT REPORT: ABNORMAL

## 2021-08-18 ENCOUNTER — HOSPITAL ENCOUNTER (OUTPATIENT)
Facility: HOSPITAL | Age: 26
Discharge: HOME | End: 2021-08-18
Payer: MEDICAID

## 2021-08-18 ENCOUNTER — HOSPITAL ENCOUNTER (OUTPATIENT)
Dept: GENERAL RADIOLOGY | Facility: HOSPITAL | Age: 26
Discharge: HOME | End: 2021-08-18
Payer: MEDICAID

## 2021-08-18 DIAGNOSIS — R06.02 SHORTNESS OF BREATH: ICD-10-CM

## 2021-08-18 DIAGNOSIS — R07.9 CHEST PAIN, UNSPECIFIED TYPE: ICD-10-CM

## 2021-08-18 LAB
A/G RATIO: 1.6 (ref 0.8–2)
ALBUMIN SERPL-MCNC: 4.5 G/DL (ref 3.4–4.8)
ALP BLD-CCNC: 53 U/L (ref 25–100)
ALT SERPL-CCNC: 13 U/L (ref 4–36)
ANION GAP SERPL CALCULATED.3IONS-SCNC: 10 MMOL/L (ref 3–16)
AST SERPL-CCNC: 13 U/L (ref 8–33)
BASOPHILS ABSOLUTE: 0.1 K/UL (ref 0–0.1)
BASOPHILS RELATIVE PERCENT: 0.8 %
BILIRUB SERPL-MCNC: 0.3 MG/DL (ref 0.3–1.2)
BUN BLDV-MCNC: 14 MG/DL (ref 6–20)
CALCIUM SERPL-MCNC: 9.4 MG/DL (ref 8.5–10.5)
CHLORIDE BLD-SCNC: 106 MMOL/L (ref 98–107)
CHOLESTEROL, TOTAL: 194 MG/DL (ref 0–200)
CO2: 21 MMOL/L (ref 20–30)
CREAT SERPL-MCNC: 1 MG/DL (ref 0.4–1.2)
EOSINOPHILS ABSOLUTE: 0.1 K/UL (ref 0–0.4)
EOSINOPHILS RELATIVE PERCENT: 1.2 %
FOLATE: 6.88 NG/ML
GFR AFRICAN AMERICAN: >59
GFR NON-AFRICAN AMERICAN: >60
GLOBULIN: 2.8 G/DL
GLUCOSE BLD-MCNC: 86 MG/DL (ref 74–106)
HCT VFR BLD CALC: 41.9 % (ref 37–47)
HDLC SERPL-MCNC: 58 MG/DL (ref 40–60)
HEMOGLOBIN: 13.9 G/DL (ref 11.5–16.5)
IMMATURE GRANULOCYTES #: 0 K/UL
IMMATURE GRANULOCYTES %: 0.3 % (ref 0–5)
LDL CHOLESTEROL CALCULATED: 115 MG/DL
LYMPHOCYTES ABSOLUTE: 2 K/UL (ref 1.5–4)
LYMPHOCYTES RELATIVE PERCENT: 30.2 %
MCH RBC QN AUTO: 29 PG (ref 27–32)
MCHC RBC AUTO-ENTMCNC: 33.2 G/DL (ref 31–35)
MCV RBC AUTO: 87.3 FL (ref 80–100)
MONOCYTES ABSOLUTE: 0.4 K/UL (ref 0.2–0.8)
MONOCYTES RELATIVE PERCENT: 5.6 %
NEUTROPHILS ABSOLUTE: 4.1 K/UL (ref 2–7.5)
NEUTROPHILS RELATIVE PERCENT: 61.9 %
PDW BLD-RTO: 12.3 % (ref 11–16)
PLATELET # BLD: 243 K/UL (ref 150–400)
PMV BLD AUTO: 10.8 FL (ref 6–10)
POTASSIUM SERPL-SCNC: 3.9 MMOL/L (ref 3.4–5.1)
PRO-BNP: 32 PG/ML (ref 0–900)
RBC # BLD: 4.8 M/UL (ref 3.8–5.8)
SODIUM BLD-SCNC: 137 MMOL/L (ref 136–145)
TOTAL PROTEIN: 7.3 G/DL (ref 6.4–8.3)
TRIGL SERPL-MCNC: 107 MG/DL (ref 0–249)
TROPONIN: <0.3 NG/ML
TSH SERPL DL<=0.05 MIU/L-ACNC: 1.85 UIU/ML (ref 0.27–4.2)
VITAMIN B-12: 307 PG/ML (ref 211–911)
VLDLC SERPL CALC-MCNC: 21 MG/DL
WBC # BLD: 6.6 K/UL (ref 4–11)

## 2021-08-18 PROCEDURE — 93005 ELECTROCARDIOGRAM TRACING: CPT

## 2021-08-18 PROCEDURE — 80061 LIPID PANEL: CPT

## 2021-08-18 PROCEDURE — 36415 COLL VENOUS BLD VENIPUNCTURE: CPT

## 2021-08-18 PROCEDURE — 82746 ASSAY OF FOLIC ACID SERUM: CPT

## 2021-08-18 PROCEDURE — 82607 VITAMIN B-12: CPT

## 2021-08-18 PROCEDURE — 83880 ASSAY OF NATRIURETIC PEPTIDE: CPT

## 2021-08-18 PROCEDURE — 71046 X-RAY EXAM CHEST 2 VIEWS: CPT

## 2021-08-18 PROCEDURE — 84484 ASSAY OF TROPONIN QUANT: CPT

## 2021-08-18 PROCEDURE — 80053 COMPREHEN METABOLIC PANEL: CPT

## 2021-08-18 PROCEDURE — 84443 ASSAY THYROID STIM HORMONE: CPT

## 2021-08-18 PROCEDURE — 85025 COMPLETE CBC W/AUTO DIFF WBC: CPT

## 2021-08-18 PROCEDURE — 82553 CREATINE MB FRACTION: CPT

## 2021-08-21 LAB — MISCELLANEOUS LAB TEST ORDER: NORMAL

## 2021-10-01 ENCOUNTER — OFFICE VISIT (OUTPATIENT)
Dept: CARDIOLOGY | Facility: CLINIC | Age: 26
End: 2021-10-01

## 2021-10-01 ENCOUNTER — HOSPITAL ENCOUNTER (OUTPATIENT)
Facility: HOSPITAL | Age: 26
Discharge: HOME | End: 2021-10-01
Payer: MEDICAID

## 2021-10-01 VITALS
HEIGHT: 67 IN | BODY MASS INDEX: 21.97 KG/M2 | OXYGEN SATURATION: 98 % | WEIGHT: 140 LBS | SYSTOLIC BLOOD PRESSURE: 108 MMHG | HEART RATE: 82 BPM | DIASTOLIC BLOOD PRESSURE: 84 MMHG

## 2021-10-01 DIAGNOSIS — R00.2 PALPITATIONS: ICD-10-CM

## 2021-10-01 DIAGNOSIS — R55 SYNCOPE AND COLLAPSE: Primary | ICD-10-CM

## 2021-10-01 DIAGNOSIS — R07.2 PRECORDIAL PAIN: ICD-10-CM

## 2021-10-01 DIAGNOSIS — I95.1 AUTONOMIC ORTHOSTATIC HYPOTENSION: ICD-10-CM

## 2021-10-01 LAB
GONADOTROPIN, CHORIONIC (HCG) QUANT: <0.1 MIU/ML
HCG QUALITATIVE: NEGATIVE

## 2021-10-01 PROCEDURE — 99244 OFF/OP CNSLTJ NEW/EST MOD 40: CPT | Performed by: INTERNAL MEDICINE

## 2021-10-01 PROCEDURE — 93005 ELECTROCARDIOGRAM TRACING: CPT

## 2021-10-01 PROCEDURE — 84702 CHORIONIC GONADOTROPIN TEST: CPT

## 2021-10-01 PROCEDURE — 93000 ELECTROCARDIOGRAM COMPLETE: CPT | Performed by: INTERNAL MEDICINE

## 2021-10-01 PROCEDURE — 84703 CHORIONIC GONADOTROPIN ASSAY: CPT

## 2021-10-05 ENCOUNTER — HOSPITAL ENCOUNTER (OUTPATIENT)
Dept: RESPIRATORY THERAPY | Facility: HOSPITAL | Age: 26
Discharge: HOME | End: 2021-10-05
Payer: MEDICAID

## 2021-10-05 PROCEDURE — 93225 XTRNL ECG REC<48 HRS REC: CPT

## 2021-10-11 ENCOUNTER — HOSPITAL ENCOUNTER (OUTPATIENT)
Facility: HOSPITAL | Age: 26
Discharge: HOME | End: 2021-10-11
Payer: MEDICAID

## 2021-10-11 PROCEDURE — 87491 CHLMYD TRACH DNA AMP PROBE: CPT

## 2021-10-11 PROCEDURE — 87591 N.GONORRHOEAE DNA AMP PROB: CPT

## 2021-10-13 LAB
C. TRACHOMATIS DNA ,URINE: NEGATIVE
N. GONORRHOEAE DNA, URINE: NEGATIVE

## 2021-10-18 ENCOUNTER — TELEPHONE (OUTPATIENT)
Dept: CARDIOLOGY | Facility: CLINIC | Age: 26
End: 2021-10-18

## 2021-10-18 ENCOUNTER — HOSPITAL ENCOUNTER (OUTPATIENT)
Dept: CARDIOLOGY | Facility: HOSPITAL | Age: 26
End: 2021-10-18

## 2021-10-18 ENCOUNTER — HOSPITAL ENCOUNTER (OUTPATIENT)
Dept: CARDIOLOGY | Facility: HOSPITAL | Age: 26
Discharge: HOME | End: 2021-10-18
Admitting: INTERNAL MEDICINE

## 2021-10-18 VITALS
SYSTOLIC BLOOD PRESSURE: 108 MMHG | WEIGHT: 140 LBS | DIASTOLIC BLOOD PRESSURE: 60 MMHG | BODY MASS INDEX: 21.97 KG/M2 | HEIGHT: 67 IN

## 2021-10-18 DIAGNOSIS — R55 SYNCOPE AND COLLAPSE: ICD-10-CM

## 2021-10-18 LAB
BH CV ECHO MEAS - AO MAX PG (FULL): 2.2 MMHG
BH CV ECHO MEAS - AO MAX PG: 6 MMHG
BH CV ECHO MEAS - AO MEAN PG (FULL): 1.2 MMHG
BH CV ECHO MEAS - AO MEAN PG: 3.3 MMHG
BH CV ECHO MEAS - AO ROOT AREA (BSA CORRECTED): 1.6
BH CV ECHO MEAS - AO ROOT AREA: 5.9 CM^2
BH CV ECHO MEAS - AO ROOT DIAM: 2.7 CM
BH CV ECHO MEAS - AO V2 MAX: 120.2 CM/SEC
BH CV ECHO MEAS - AO V2 MEAN: 83.7 CM/SEC
BH CV ECHO MEAS - AO V2 VTI: 27.6 CM
BH CV ECHO MEAS - ASC AORTA: 2.5 CM
BH CV ECHO MEAS - AVA(I,A): 2.3 CM^2
BH CV ECHO MEAS - AVA(I,D): 2.3 CM^2
BH CV ECHO MEAS - AVA(V,A): 2.5 CM^2
BH CV ECHO MEAS - AVA(V,D): 2.5 CM^2
BH CV ECHO MEAS - BSA(HAYCOCK): 1.7 M^2
BH CV ECHO MEAS - BSA: 1.7 M^2
BH CV ECHO MEAS - BZI_BMI: 21.9 KILOGRAMS/M^2
BH CV ECHO MEAS - BZI_METRIC_HEIGHT: 170.2 CM
BH CV ECHO MEAS - BZI_METRIC_WEIGHT: 63.5 KG
BH CV ECHO MEAS - EDV(CUBED): 85.5 ML
BH CV ECHO MEAS - EDV(MOD-SP2): 70.8 ML
BH CV ECHO MEAS - EDV(MOD-SP4): 66 ML
BH CV ECHO MEAS - EDV(TEICH): 87.9 ML
BH CV ECHO MEAS - EF(CUBED): 75.9 %
BH CV ECHO MEAS - EF(MOD-SP2): 52.3 %
BH CV ECHO MEAS - EF(MOD-SP4): 58.8 %
BH CV ECHO MEAS - EF(TEICH): 68.1 %
BH CV ECHO MEAS - ESV(CUBED): 20.6 ML
BH CV ECHO MEAS - ESV(MOD-SP2): 33.8 ML
BH CV ECHO MEAS - ESV(MOD-SP4): 27.2 ML
BH CV ECHO MEAS - ESV(TEICH): 28.1 ML
BH CV ECHO MEAS - FS: 37.7 %
BH CV ECHO MEAS - IVS/LVPW: 1.1
BH CV ECHO MEAS - IVSD: 0.94 CM
BH CV ECHO MEAS - LA DIMENSION: 2.9 CM
BH CV ECHO MEAS - LA/AO: 1
BH CV ECHO MEAS - LAD MAJOR: 4.4 CM
BH CV ECHO MEAS - LAT PEAK E' VEL: 20.6 CM/SEC
BH CV ECHO MEAS - LATERAL E/E' RATIO: 4.8
BH CV ECHO MEAS - LV DIASTOLIC VOL/BSA (35-75): 38 ML/M^2
BH CV ECHO MEAS - LV MASS(C)D: 130.2 GRAMS
BH CV ECHO MEAS - LV MASS(C)DI: 74.9 GRAMS/M^2
BH CV ECHO MEAS - LV MAX PG: 3.8 MMHG
BH CV ECHO MEAS - LV MEAN PG: 2.1 MMHG
BH CV ECHO MEAS - LV SYSTOLIC VOL/BSA (12-30): 15.7 ML/M^2
BH CV ECHO MEAS - LV V1 MAX: 97.7 CM/SEC
BH CV ECHO MEAS - LV V1 MEAN: 66.8 CM/SEC
BH CV ECHO MEAS - LV V1 VTI: 20.5 CM
BH CV ECHO MEAS - LVIDD: 4.4 CM
BH CV ECHO MEAS - LVIDS: 2.7 CM
BH CV ECHO MEAS - LVLD AP2: 7.5 CM
BH CV ECHO MEAS - LVLD AP4: 7.5 CM
BH CV ECHO MEAS - LVLS AP2: 6.5 CM
BH CV ECHO MEAS - LVLS AP4: 6.4 CM
BH CV ECHO MEAS - LVOT AREA (M): 3.1 CM^2
BH CV ECHO MEAS - LVOT AREA: 3.1 CM^2
BH CV ECHO MEAS - LVOT DIAM: 2 CM
BH CV ECHO MEAS - LVPWD: 0.88 CM
BH CV ECHO MEAS - MED PEAK E' VEL: 15.1 CM/SEC
BH CV ECHO MEAS - MEDIAL E/E' RATIO: 6.6
BH CV ECHO MEAS - MV A MAX VEL: 42.2 CM/SEC
BH CV ECHO MEAS - MV DEC SLOPE: 366.4 CM/SEC^2
BH CV ECHO MEAS - MV DEC TIME: 0.16 SEC
BH CV ECHO MEAS - MV E MAX VEL: 99.9 CM/SEC
BH CV ECHO MEAS - MV E/A: 2.4
BH CV ECHO MEAS - MV P1/2T MAX VEL: 133.4 CM/SEC
BH CV ECHO MEAS - MV P1/2T: 106.6 MSEC
BH CV ECHO MEAS - MVA P1/2T LCG: 1.6 CM^2
BH CV ECHO MEAS - MVA(P1/2T): 2.1 CM^2
BH CV ECHO MEAS - PA ACC TIME: 0.23 SEC
BH CV ECHO MEAS - PA MAX PG: 4.4 MMHG
BH CV ECHO MEAS - PA PR(ACCEL): -24 MMHG
BH CV ECHO MEAS - PA V2 MAX: 104.5 CM/SEC
BH CV ECHO MEAS - PI END-D VEL: 89.6 CM/SEC
BH CV ECHO MEAS - RAP SYSTOLE: 3 MMHG
BH CV ECHO MEAS - RVSP: 17 MMHG
BH CV ECHO MEAS - SI(AO): 94.2 ML/M^2
BH CV ECHO MEAS - SI(CUBED): 37.3 ML/M^2
BH CV ECHO MEAS - SI(LVOT): 36.6 ML/M^2
BH CV ECHO MEAS - SI(MOD-SP2): 21.3 ML/M^2
BH CV ECHO MEAS - SI(MOD-SP4): 22.3 ML/M^2
BH CV ECHO MEAS - SI(TEICH): 34.4 ML/M^2
BH CV ECHO MEAS - SV(AO): 163.7 ML
BH CV ECHO MEAS - SV(CUBED): 64.8 ML
BH CV ECHO MEAS - SV(LVOT): 63.6 ML
BH CV ECHO MEAS - SV(MOD-SP2): 37 ML
BH CV ECHO MEAS - SV(MOD-SP4): 38.8 ML
BH CV ECHO MEAS - SV(TEICH): 59.8 ML
BH CV ECHO MEAS - TAPSE (>1.6): 1.8 CM
BH CV ECHO MEAS - TR MAX PG: 14 MMHG
BH CV ECHO MEAS - TR MAX VEL: 167.5 CM/SEC
BH CV ECHO MEASUREMENTS AVERAGE E/E' RATIO: 5.6
BH CV XLRA - RV BASE: 3.3 CM
BH CV XLRA - RV LENGTH: 6.6 CM
BH CV XLRA - RV MID: 2.5 CM
BH CV XLRA - TDI S': 13.1 CM/SEC
LEFT ATRIUM VOLUME INDEX: 20.5 ML/M^2
LEFT ATRIUM VOLUME: 35.6 ML
MAXIMAL PREDICTED HEART RATE: 196 BPM
STRESS TARGET HR: 167 BPM

## 2021-10-18 PROCEDURE — 93306 TTE W/DOPPLER COMPLETE: CPT | Performed by: INTERNAL MEDICINE

## 2021-10-18 PROCEDURE — 93306 TTE W/DOPPLER COMPLETE: CPT

## 2021-10-18 NOTE — TELEPHONE ENCOUNTER
Patient contacted to review echo and monitor results. Patient states she had no episodes while wearing monitor. Patient advised to keep scheduled follow up. Patient verbalizes understanding, all questions answered at this time.

## 2021-10-18 NOTE — TELEPHONE ENCOUNTER
----- Message from Sudarshan Perez MD sent at 10/18/2021  2:39 PM EDT -----  Please let the patient know her heart monitor did not reveal a significant arrhythmia.  Please ask if she had any syncopal, presyncopal events while wearing the monitor.  Separate note about her echo sent

## 2021-11-01 ENCOUNTER — HOSPITAL ENCOUNTER (OUTPATIENT)
Dept: CARDIOLOGY | Facility: HOSPITAL | Age: 26
Discharge: HOME | End: 2021-11-01
Admitting: INTERNAL MEDICINE

## 2021-11-01 VITALS
HEART RATE: 88 BPM | SYSTOLIC BLOOD PRESSURE: 120 MMHG | RESPIRATION RATE: 16 BRPM | DIASTOLIC BLOOD PRESSURE: 84 MMHG | HEIGHT: 67 IN | WEIGHT: 140 LBS | BODY MASS INDEX: 21.97 KG/M2

## 2021-11-01 DIAGNOSIS — R55 SYNCOPE AND COLLAPSE: ICD-10-CM

## 2021-11-01 DIAGNOSIS — R06.09 DYSPNEA ON EXERTION: Primary | ICD-10-CM

## 2021-11-01 LAB
MAXIMAL PREDICTED HEART RATE: 196 BPM
STRESS TARGET HR: 167 BPM

## 2021-11-01 PROCEDURE — 93660 TILT TABLE EVALUATION: CPT | Performed by: INTERNAL MEDICINE

## 2021-11-01 PROCEDURE — 93660 TILT TABLE EVALUATION: CPT

## 2021-11-05 ENCOUNTER — OFFICE VISIT (OUTPATIENT)
Dept: CARDIOLOGY | Facility: CLINIC | Age: 26
End: 2021-11-05

## 2021-11-05 VITALS
BODY MASS INDEX: 22.44 KG/M2 | OXYGEN SATURATION: 99 % | DIASTOLIC BLOOD PRESSURE: 68 MMHG | WEIGHT: 143 LBS | SYSTOLIC BLOOD PRESSURE: 116 MMHG | HEART RATE: 82 BPM | HEIGHT: 67 IN

## 2021-11-05 DIAGNOSIS — I95.1 AUTONOMIC ORTHOSTATIC HYPOTENSION: ICD-10-CM

## 2021-11-05 DIAGNOSIS — R55 SYNCOPE, UNSPECIFIED SYNCOPE TYPE: ICD-10-CM

## 2021-11-05 DIAGNOSIS — R00.2 PALPITATIONS: Primary | ICD-10-CM

## 2021-11-05 DIAGNOSIS — R07.2 PRECORDIAL PAIN: ICD-10-CM

## 2021-11-05 PROCEDURE — 99214 OFFICE O/P EST MOD 30 MIN: CPT | Performed by: INTERNAL MEDICINE

## 2021-11-05 RX ORDER — BISOPROLOL FUMARATE 5 MG/1
2.5 TABLET, FILM COATED ORAL DAILY
Qty: 30 TABLET | Refills: 5 | Status: SHIPPED | OUTPATIENT
Start: 2021-11-05 | End: 2022-08-11

## 2021-11-05 NOTE — PROGRESS NOTES
[facility]   [address], [address]  [place], [place], [address]    [phone]  [facility]           OUTPATIENT CLINIC FOLLOW UP NOTE    Patient Care Team:  Patient Care Team:  Mickie Hernandez as PCP - General (Nurse Practitioner)  Hope Gomez MD as Consulting Physician (General Surgery)    Subjective:      Chief Complaint   Patient presents with   • Loss of Consciousness       HPI:    Ashish Wilkerson is a 26 y.o. female.  Problem list:  1. Syncope  a. Holter monitor 10/18/2021, no significant arrhythmia  b. Table 11/1/2021, consistent with POTs  2. Chest pain  3. Palpitations  4. Migraines  5. Ovarian cyst    Patient presents today for follow-up.    Since her last visit patient has continued to have lightheadedness, dyspnea on exertion and intermittent chest pain.  Denies syncopal episode since last visit.  Has not been very active due to symptoms.  Patient wore a 48-hour heart monitor that showed no significant arrhythmia.  However she did not have any symptoms while wearing the monitor.  Patient also underwent tilt table test which showed significant increase in heart rate during the test, without corresponding change in blood pressure, consistent with POTs.      Review of Systems:  Positive for lightheadedness, chest pain, palpitations, fatigue  Negative for exertional chest pain, dyspnea with exertion, lower extremity edema, palpitations, syncope.     PFSH:  Patient Active Problem List   Diagnosis   • Ovarian cyst   • Pelvic pain in female   • Moderate persistent asthma   • Syncope   • Palpitations   • Precordial pain   • Autonomic orthostatic hypotension         Current Outpatient Medications:   •  albuterol sulfate  (90 Base) MCG/ACT inhaler, Inhale 2 puffs Every 4 (Four) Hours As Needed for Wheezing., Disp: 6.7 g, Rfl: 5  •  azelastine (ASTELIN) 0.1 % nasal spray, , Disp: , Rfl:   •  budesonide-formoterol (SYMBICORT) 80-4.5 MCG/ACT  "inhaler, Inhale 2 puffs 2 (Two) Times a Day., Disp: 1 each, Rfl: 4  •  busPIRone (BUSPAR) 15 MG tablet, 3 (Three) Times a Day., Disp: , Rfl:   •  hydrOXYzine (ATARAX) 50 MG tablet, , Disp: , Rfl:   •  lamoTRIgine (LaMICtal) 100 MG tablet, Daily., Disp: , Rfl:   •  omeprazole (priLOSEC) 40 MG capsule, Take 1 capsule by mouth Daily., Disp: 30 capsule, Rfl: 5  •  topiramate (TOPAMAX) 100 MG tablet, Take 150 mg by mouth., Disp: , Rfl:   •  Topiramate  MG capsule extended-release 24 hour sprinkle, , Disp: , Rfl:   •  traZODone (DESYREL) 50 MG tablet, Every Night., Disp: , Rfl:     Allergies   Allergen Reactions   • Zoloft [Sertraline Hcl] Other (See Comments)     Migraines         reports that she has never smoked. She has never used smokeless tobacco.      Objective:   Physical exam:  /68   Pulse 82   Ht 170.2 cm (67\")   Wt 64.9 kg (143 lb)   SpO2 99%   BMI 22.40 kg/m²   CONSTITUTIONAL: No acute distress  RESPIRATORY: Normal effort. Clear to auscultation bilaterally without wheezing or rales  CARDIOVASCULAR: Carotids with normal upstrokes without bruits.  Regular rate and rhythm with normal S1 and S2. Without murmur, gallop or rub. Normal radial pulse. There is no lower extremity edema bilaterally.    Labs:    BUN   Date Value Ref Range Status   08/12/2019 8 6 - 20 mg/dL Final     Creatinine   Date Value Ref Range Status   08/12/2019 0.77 0.57 - 1.00 mg/dL Final     Potassium   Date Value Ref Range Status   08/12/2019 3.9 3.5 - 5.2 mmol/L Final     ALT (SGPT)   Date Value Ref Range Status   08/12/2019 11 1 - 33 U/L Final     AST (SGOT)   Date Value Ref Range Status   08/12/2019 13 1 - 32 U/L Final       No results found for: CHOL  Lab Results   Component Value Date    TRIG 107 08/18/2021     Lab Results   Component Value Date    HDL 58 08/18/2021     Lab Results   Component Value Date     08/18/2021     No components found for: LDLDIRECTC    Diagnostic Data:    Procedures    Results for orders " placed during the hospital encounter of 10/18/21    Adult Transthoracic Echo Complete W/ Cont if Necessary Per Protocol    Interpretation Summary  · Left ventricular ejection fraction appears to be 56 - 60%. Left ventricular systolic function is normal.  · The cardiac valves are structurally and functionally within normal limits.    Tilt Table 11/1/2021  · Rhythm during the study was sinus. She was sinus bradycardia at baseline. Exhibited sinus tachycardia during the tilt table portion of the test. Maximum heart rate 150 bpm.  · During the tilt table portion of the test the patient felt warm all over, appeared pale, mildly diaphoretic, she endorsed chest pain  · Blood pressure remained within normal limits during the study, no orthostatic change in blood pressure  · Impression: Significant increase in heart rate during the tilt table test, without corresponding change in blood pressure, consistent with POTs. There was no syncope    Holter monitor 10/18/2021  · No arrhythmia noted. One patient level event associated with sinus rhythm.         Assessment and Plan:   Diagnoses and all orders for this visit:    POTs Syndrome  Palpitations   Precordial pain  Autonomic orthostatic hypotension  Syncope, unspecified syncope type  -Zebeta 2.5 mg daily.  If without side effects but with persistent symptoms, will trial 5 mg daily.    -Encourage patient to continue exercise.  -Discussed importance of hydration.  -Recommend compression stockings (20-30 mmHg) when on her feet for long periods of time such as when she hikes.    -We will consider salt tablets at next visit if still symptomatic    - Return in about 4 weeks (around 12/3/2021) for Next scheduled follow up.    Scribed for Sudarshan Perez MD by SHASHANK Butcher. 11/5/2021  15:03 EDT    I, Sudarshan Perez MD, personally performed the services as scribed by the above named individual. I have made any necessary edits and it is both accurate and complete.     Sudarshan ESPINOZA  MD Ana, MSc, FACC, [facility]  Interventional Cardiology  [facility]

## 2021-11-23 ENCOUNTER — TELEPHONE (OUTPATIENT)
Dept: SURGERY | Facility: CLINIC | Age: 26
End: 2021-11-23

## 2021-11-23 NOTE — TELEPHONE ENCOUNTER
Called patient concerning her 6 month breast appointment that she canceled with Dr Gomez. Patient stated that she will call back at a later date after she checks her schedule

## 2021-12-07 ENCOUNTER — CLINICAL SUPPORT (OUTPATIENT)
Dept: GENETICS | Facility: HOSPITAL | Age: 26
End: 2021-12-07

## 2021-12-07 DIAGNOSIS — Z83.49 FAMILY HISTORY OF CYSTIC FIBROSIS: ICD-10-CM

## 2021-12-07 DIAGNOSIS — Z14.1 CYSTIC FIBROSIS CARRIER: ICD-10-CM

## 2021-12-07 DIAGNOSIS — Z13.79 GENETIC TESTING: Primary | ICD-10-CM

## 2021-12-14 NOTE — PROGRESS NOTES
Ashish Wilkerson is a 26-year-old female who was seen for genetic counseling to discuss her family history of cystic fibrosis (CF) and genetic testing that had previously been completed. Genetic counseling was completed via telehealth. Ms. Wilkerson reports that she was diagnosed with asthma in grade school and she has also struggled with shortness of breath. Her mother was diagnosed with CF at age 41, which led to concern for Ms. Wilkerson. Her pulmonologist ordered genetic testing that evaluated 32 common CF mutations. 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 Ms. Wilkerson was interested in discussing these test results and recommendations for additional genetic testing.     Family History:    Mother:  Cystic fibrosis dx. 41   Mat. Second Cousin: Cystic fibrosis,  at age 6  Mat. Second Cousin: Cystic fibrosis,  at age 23    Records regarding the family history were not provided for review. Ms. Wilkerson is in the process of obtaining a copy of her mother’s genetic test results confirming her diagnosis of CF.     Genetic Counseling:  Cystic Fibrosis is an autosomal recessive condition that leads to respiratory complications and pancreatic insufficiency. It is characterized by the buildup of thick, sticky mucus that can damage many of the body's organs. The disorder's most common signs and symptoms include progressive damage to the respiratory system and chronic digestive system problems. The features of the disorder and their severity varies among affected individuals. Both parents must carry pathogenic mutations in the CFTR gene (the gene that causes Cystic Fibrosis) in order for their children to be at risk.  If both parents are carriers, there is a 1 in 4 chance that each child will have Cystic Fibrosis.      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   This means that each of her children are at 50% risk to receive the non-working copy of the CFTR gene from her.  In order for her to have a child with CF, Ms. Wilkerson’s partner must be a carrier of a pathogenic mutation in the CFTR gene (the carrier frequency in the [place]  population is 1 in 25) and he must pass that mutation on to the child.      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 Given her personal and family history, we discussed that Ms. Wilkerson could consider full sequencing and deletion/duplication analysis of the CFTR gene to ensure that she doesn’t have a second CFTR mutation and to provide accurate risk assessment for Ms. Wilkerson. The testing already completed has a 90% detection rate in the white population. Ms. Wilkerson was interested in pursuing this additional testing of the CFTR gene.     Plan:  Ms. Wilkerson is interested in pursuing sequencing and deletion/duplication analysis of the CFTR gene through [facility]. An insurance pre-verification has been requested through the lab to determine OOP cost for Ms. Wilkerson. Once the estimated OOP cost is determined, Ms. Wilkerson will be contacted and a sample collection will be coordinated at that time either through blood draw or saliva collection kit. She is welcome to contact us in the meantime with questions at [phone].     Tegan Walker MS, [facility], [facility]    Licensed Certified Genetic Counselor

## 2021-12-21 ENCOUNTER — TELEPHONE (OUTPATIENT)
Dept: GENETICS | Facility: HOSPITAL | Age: 26
End: 2021-12-21

## 2021-12-21 NOTE — TELEPHONE ENCOUNTER
Called patient to discuss benefits investigation for genetic testing of CFTR gene (Sequencing and deletion/duplication analysis), as discussed during telephone consult on 12/7/2021. The testing lab, [facility], determined the patient's OOP cost is $0 and the estimate is valid through 12/31/2021. Patient stated she is interested in proceeding with testing, therefore a saliva collection kit is being mailed to her home address. Reviewed YASMIN. Results expected 2-3 weeks after she mails her saliva sample in. Encouraged to call me with any questions.     Tegan Walker, MS, [facility], [facility]  Licensed Certified Genetic Counselor

## 2022-01-04 ENCOUNTER — HOSPITAL ENCOUNTER (OUTPATIENT)
Facility: HOSPITAL | Age: 27
Discharge: HOME | End: 2022-01-04
Payer: MEDICAID

## 2022-01-04 LAB — SARS-COV-2, NAAT: DETECTED

## 2022-01-04 PROCEDURE — 87635 SARS-COV-2 COVID-19 AMP PRB: CPT

## 2022-01-20 ENCOUNTER — DOCUMENTATION (OUTPATIENT)
Dept: GENETICS | Facility: HOSPITAL | Age: 27
End: 2022-01-20

## 2022-02-24 ENCOUNTER — HOSPITAL ENCOUNTER (OUTPATIENT)
Facility: HOSPITAL | Age: 27
Discharge: HOME | End: 2022-02-24
Payer: MEDICAID

## 2022-02-24 LAB — SARS-COV-2, NAAT: NOT DETECTED

## 2022-02-24 PROCEDURE — 87635 SARS-COV-2 COVID-19 AMP PRB: CPT

## 2022-04-06 ENCOUNTER — OFFICE VISIT (OUTPATIENT)
Dept: PRIMARY CARE CLINIC | Age: 27
End: 2022-04-06
Payer: MEDICAID

## 2022-04-06 VITALS — HEART RATE: 105 BPM | RESPIRATION RATE: 18 BRPM | TEMPERATURE: 98 F | OXYGEN SATURATION: 98 %

## 2022-04-06 DIAGNOSIS — N39.0 URINARY TRACT INFECTION WITH HEMATURIA, SITE UNSPECIFIED: Primary | ICD-10-CM

## 2022-04-06 DIAGNOSIS — R31.9 URINARY TRACT INFECTION WITH HEMATURIA, SITE UNSPECIFIED: Primary | ICD-10-CM

## 2022-04-06 LAB
BILIRUBIN, POC: NORMAL
BLOOD URINE, POC: NORMAL
CLARITY, POC: CLEAR
COLOR, POC: YELLOW
GLUCOSE URINE, POC: NORMAL
KETONES, POC: NORMAL
LEUKOCYTE EST, POC: NORMAL
NITRITE, POC: NORMAL
PH, POC: 6
PROTEIN, POC: NORMAL
SPECIFIC GRAVITY, POC: 1.03
UROBILINOGEN, POC: 0.2

## 2022-04-06 PROCEDURE — 81002 URINALYSIS NONAUTO W/O SCOPE: CPT | Performed by: NURSE PRACTITIONER

## 2022-04-06 PROCEDURE — 99213 OFFICE O/P EST LOW 20 MIN: CPT | Performed by: NURSE PRACTITIONER

## 2022-04-06 RX ORDER — PHENAZOPYRIDINE HYDROCHLORIDE 200 MG/1
200 TABLET, FILM COATED ORAL 3 TIMES DAILY PRN
Qty: 10 TABLET | Refills: 0 | Status: SHIPPED | OUTPATIENT
Start: 2022-04-06 | End: 2022-04-09

## 2022-04-06 RX ORDER — SULFAMETHOXAZOLE AND TRIMETHOPRIM 800; 160 MG/1; MG/1
1 TABLET ORAL 2 TIMES DAILY
Qty: 20 TABLET | Refills: 0 | Status: SHIPPED | OUTPATIENT
Start: 2022-04-06 | End: 2022-04-16

## 2022-04-06 RX ORDER — FLUCONAZOLE 150 MG/1
150 TABLET ORAL
Qty: 2 TABLET | Refills: 0 | Status: SHIPPED | OUTPATIENT
Start: 2022-04-06 | End: 2022-04-12

## 2022-04-06 ASSESSMENT — PATIENT HEALTH QUESTIONNAIRE - PHQ9: DEPRESSION UNABLE TO ASSESS: URGENT/EMERGENT SITUATION
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
## 2022-06-21 ENCOUNTER — HOSPITAL ENCOUNTER (EMERGENCY)
Facility: HOSPITAL | Age: 27
Discharge: HOME | End: 2022-06-21
Attending: HOSPITALIST
Payer: MEDICAID

## 2022-06-21 VITALS
BODY MASS INDEX: 24.33 KG/M2 | TEMPERATURE: 98.5 F | DIASTOLIC BLOOD PRESSURE: 66 MMHG | WEIGHT: 155 LBS | RESPIRATION RATE: 18 BRPM | OXYGEN SATURATION: 99 % | SYSTOLIC BLOOD PRESSURE: 121 MMHG | HEIGHT: 67 IN | HEART RATE: 82 BPM

## 2022-06-21 DIAGNOSIS — L03.116 CELLULITIS OF LEFT LOWER EXTREMITY: Primary | ICD-10-CM

## 2022-06-21 PROCEDURE — 99283 EMERGENCY DEPT VISIT LOW MDM: CPT

## 2022-06-21 RX ORDER — CLINDAMYCIN HYDROCHLORIDE 300 MG/1
300 CAPSULE ORAL 3 TIMES DAILY
Qty: 30 CAPSULE | Refills: 0 | Status: SHIPPED | OUTPATIENT
Start: 2022-06-21 | End: 2022-07-01

## 2022-06-21 RX ORDER — BISOPROLOL FUMARATE 5 MG/1
5 TABLET ORAL DAILY
COMMUNITY
End: 2022-10-29

## 2022-06-21 ASSESSMENT — PAIN DESCRIPTION - ORIENTATION: ORIENTATION: LEFT

## 2022-06-21 ASSESSMENT — PAIN - FUNCTIONAL ASSESSMENT
PAIN_FUNCTIONAL_ASSESSMENT: 0-10
PAIN_FUNCTIONAL_ASSESSMENT: NONE - DENIES PAIN

## 2022-06-21 ASSESSMENT — PAIN DESCRIPTION - PAIN TYPE: TYPE: ACUTE PAIN

## 2022-06-21 ASSESSMENT — PAIN DESCRIPTION - DESCRIPTORS: DESCRIPTORS: ACHING

## 2022-06-21 ASSESSMENT — PAIN SCALES - GENERAL: PAINLEVEL_OUTOF10: 4

## 2022-06-21 ASSESSMENT — PAIN DESCRIPTION - LOCATION: LOCATION: LEG
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
## 2022-06-21 NOTE — ED NOTES
Reviewed discharge plan with Ulysses Bourgeois. Encouraged her to f/u with JESSICA Trejo CNP and she understood. NAD noted on discharge, gait steady. Reviewed discharge prescription for:     Current Discharge Medication List      START taking these medications    Details   clindamycin (CLEOCIN) 300 MG capsule Take 1 capsule by mouth 3 times daily for 10 days  Qty: 30 capsule, Refills: 0      mupirocin (BACTROBAN) 2 % ointment Apply topically 3 times daily. Qty: 22 g, Refills: 0             [facility] understanding of how and when to take medications.       Electronically signed by Batsheva Dahl RN on 6/21/2022 at 4:58 PM     Batsheva Dahl RN  06/21/22 8901

## 2022-08-11 ENCOUNTER — HOSPITAL ENCOUNTER (OUTPATIENT)
Dept: CT IMAGING | Facility: HOSPITAL | Age: 27
Discharge: HOME | End: 2022-08-11
Payer: MEDICAID

## 2022-08-11 DIAGNOSIS — R10.84 GENERALIZED ABDOMINAL PAIN: ICD-10-CM

## 2022-08-11 PROCEDURE — 74176 CT ABD & PELVIS W/O CONTRAST: CPT

## 2022-08-11 RX ORDER — BISOPROLOL FUMARATE 5 MG/1
2.5 TABLET, FILM COATED ORAL DAILY
Qty: 30 TABLET | Refills: 5 | Status: SHIPPED | OUTPATIENT
Start: 2022-08-11 | End: 2022-11-29

## 2022-09-11 ENCOUNTER — HOSPITAL ENCOUNTER (EMERGENCY)
Facility: HOSPITAL | Age: 27
Discharge: ANOTHER HOSPITAL | End: 2022-09-11
Attending: EMERGENCY MEDICINE
Payer: MEDICAID

## 2022-09-11 ENCOUNTER — APPOINTMENT (OUTPATIENT)
Dept: CT IMAGING | Facility: HOSPITAL | Age: 27
End: 2022-09-11
Payer: MEDICAID

## 2022-09-11 ENCOUNTER — APPOINTMENT (OUTPATIENT)
Dept: GENERAL RADIOLOGY | Facility: HOSPITAL | Age: 27
End: 2022-09-11
Payer: MEDICAID

## 2022-09-11 VITALS
HEART RATE: 93 BPM | BODY MASS INDEX: 25.05 KG/M2 | OXYGEN SATURATION: 97 % | HEIGHT: 67 IN | TEMPERATURE: 98 F | WEIGHT: 159.6 LBS | SYSTOLIC BLOOD PRESSURE: 134 MMHG | RESPIRATION RATE: 16 BRPM | DIASTOLIC BLOOD PRESSURE: 99 MMHG

## 2022-09-11 DIAGNOSIS — S16.1XXA STRAIN OF NECK MUSCLE, INITIAL ENCOUNTER: Primary | ICD-10-CM

## 2022-09-11 DIAGNOSIS — S49.91XA RIGHT SHOULDER INJURY, INITIAL ENCOUNTER: ICD-10-CM

## 2022-09-11 DIAGNOSIS — I62.00 SUBDURAL HEMORRHAGE (HCC): ICD-10-CM

## 2022-09-11 LAB — HCG(URINE) PREGNANCY TEST: NEGATIVE

## 2022-09-11 PROCEDURE — 84703 CHORIONIC GONADOTROPIN ASSAY: CPT

## 2022-09-11 PROCEDURE — 70450 CT HEAD/BRAIN W/O DYE: CPT

## 2022-09-11 PROCEDURE — 72125 CT NECK SPINE W/O DYE: CPT

## 2022-09-11 PROCEDURE — 99285 EMERGENCY DEPT VISIT HI MDM: CPT

## 2022-09-11 PROCEDURE — 73030 X-RAY EXAM OF SHOULDER: CPT

## 2022-09-11 ASSESSMENT — PAIN SCALES - GENERAL: PAINLEVEL_OUTOF10: 6

## 2022-09-11 ASSESSMENT — PAIN - FUNCTIONAL ASSESSMENT: PAIN_FUNCTIONAL_ASSESSMENT: 0-10

## 2022-09-11 NOTE — ED NOTES
[facility]. EMS arrives to ER, patient placed on EMS gurney and taken to [facility] ER at this time.       Lilly Mosher RN  09/11/22 1946 Tissue Cultured Epidermal Autograft Text: The defect edges were debeveled with a #15 scalpel blade.  Given the location of the defect, shape of the defect and the proximity to free margins a tissue cultured epidermal autograft was deemed most appropriate.  The graft was then trimmed to fit the size of the defect.  The graft was then placed in the primary defect and oriented appropriately.
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
## 2022-09-11 NOTE — ED NOTES
RN called [facility] [address] EMS for [facility] at this time.       Giles Calloway RN  09/11/22 1945

## 2022-09-11 NOTE — ED NOTES
Spoke with Magdalena KEYS at [facility] regarding transfer to [facility]. They will call back with [facility] on the line.       Tigist [place]  09/11/22 3353

## 2022-10-04 ENCOUNTER — APPOINTMENT (OUTPATIENT)
Dept: GENERAL RADIOLOGY | Facility: HOSPITAL | Age: 27
End: 2022-10-04
Payer: MEDICAID

## 2022-10-04 ENCOUNTER — HOSPITAL ENCOUNTER (EMERGENCY)
Facility: HOSPITAL | Age: 27
Discharge: HOME | End: 2022-10-04
Attending: HOSPITALIST
Payer: MEDICAID

## 2022-10-04 VITALS
TEMPERATURE: 98.4 F | SYSTOLIC BLOOD PRESSURE: 102 MMHG | WEIGHT: 162 LBS | DIASTOLIC BLOOD PRESSURE: 53 MMHG | HEART RATE: 67 BPM | HEIGHT: 67 IN | OXYGEN SATURATION: 100 % | RESPIRATION RATE: 16 BRPM | BODY MASS INDEX: 25.43 KG/M2

## 2022-10-04 DIAGNOSIS — R07.9 CHEST PAIN, UNSPECIFIED TYPE: Primary | ICD-10-CM

## 2022-10-04 LAB
A/G RATIO: 1.5 (ref 0.8–2)
ALBUMIN SERPL-MCNC: 4 G/DL (ref 3.4–4.8)
ALP BLD-CCNC: 84 U/L (ref 25–100)
ALT SERPL-CCNC: 6 U/L (ref 4–36)
ANION GAP SERPL CALCULATED.3IONS-SCNC: 10 MMOL/L (ref 3–16)
AST SERPL-CCNC: 9 U/L (ref 8–33)
BASOPHILS ABSOLUTE: 0 K/UL (ref 0–0.1)
BASOPHILS RELATIVE PERCENT: 0.4 %
BILIRUB SERPL-MCNC: 0.4 MG/DL (ref 0.3–1.2)
BUN BLDV-MCNC: 8 MG/DL (ref 6–20)
CALCIUM SERPL-MCNC: 8.9 MG/DL (ref 8.5–10.5)
CHLORIDE BLD-SCNC: 101 MMOL/L (ref 98–107)
CO2: 26 MMOL/L (ref 20–30)
CREAT SERPL-MCNC: 0.8 MG/DL (ref 0.4–1.2)
EOSINOPHILS ABSOLUTE: 0.1 K/UL (ref 0–0.4)
EOSINOPHILS RELATIVE PERCENT: 1.1 %
GFR AFRICAN AMERICAN: >59
GFR NON-AFRICAN AMERICAN: >60
GLOBULIN: 2.6 G/DL
GLUCOSE BLD-MCNC: 109 MG/DL (ref 74–106)
HCG(URINE) PREGNANCY TEST: NEGATIVE
HCT VFR BLD CALC: 37.7 % (ref 37–47)
HEMOGLOBIN: 12.7 G/DL (ref 11.5–16.5)
IMMATURE GRANULOCYTES #: 0 K/UL
IMMATURE GRANULOCYTES %: 0.4 % (ref 0–5)
LYMPHOCYTES ABSOLUTE: 1.7 K/UL (ref 1.5–4)
LYMPHOCYTES RELATIVE PERCENT: 16.8 %
MCH RBC QN AUTO: 29 PG (ref 27–32)
MCHC RBC AUTO-ENTMCNC: 33.7 G/DL (ref 31–35)
MCV RBC AUTO: 86.1 FL (ref 80–100)
MONOCYTES ABSOLUTE: 0.8 K/UL (ref 0.2–0.8)
MONOCYTES RELATIVE PERCENT: 8.5 %
NEUTROPHILS ABSOLUTE: 7.2 K/UL (ref 2–7.5)
NEUTROPHILS RELATIVE PERCENT: 72.8 %
PDW BLD-RTO: 12.1 % (ref 11–16)
PLATELET # BLD: 220 K/UL (ref 150–400)
PMV BLD AUTO: 10.7 FL (ref 6–10)
POTASSIUM SERPL-SCNC: 3.5 MMOL/L (ref 3.4–5.1)
RBC # BLD: 4.38 M/UL (ref 3.8–5.8)
SARS-COV-2, NAAT: NOT DETECTED
SODIUM BLD-SCNC: 137 MMOL/L (ref 136–145)
TOTAL PROTEIN: 6.6 G/DL (ref 6.4–8.3)
TROPONIN: <0.3 NG/ML
WBC # BLD: 9.9 K/UL (ref 4–11)

## 2022-10-04 PROCEDURE — 84703 CHORIONIC GONADOTROPIN ASSAY: CPT

## 2022-10-04 PROCEDURE — 80053 COMPREHEN METABOLIC PANEL: CPT

## 2022-10-04 PROCEDURE — 87635 SARS-COV-2 COVID-19 AMP PRB: CPT

## 2022-10-04 PROCEDURE — 6360000002 HC RX W HCPCS: Performed by: HOSPITALIST

## 2022-10-04 PROCEDURE — 99285 EMERGENCY DEPT VISIT HI MDM: CPT

## 2022-10-04 PROCEDURE — 36415 COLL VENOUS BLD VENIPUNCTURE: CPT

## 2022-10-04 PROCEDURE — 96374 THER/PROPH/DIAG INJ IV PUSH: CPT

## 2022-10-04 PROCEDURE — 93005 ELECTROCARDIOGRAM TRACING: CPT

## 2022-10-04 PROCEDURE — 2580000003 HC RX 258: Performed by: HOSPITALIST

## 2022-10-04 PROCEDURE — 84484 ASSAY OF TROPONIN QUANT: CPT

## 2022-10-04 PROCEDURE — 71045 X-RAY EXAM CHEST 1 VIEW: CPT

## 2022-10-04 PROCEDURE — 85025 COMPLETE CBC W/AUTO DIFF WBC: CPT

## 2022-10-04 RX ORDER — ONDANSETRON 2 MG/ML
4 INJECTION INTRAMUSCULAR; INTRAVENOUS ONCE
Status: COMPLETED | OUTPATIENT
Start: 2022-10-04 | End: 2022-10-04

## 2022-10-04 RX ORDER — ONDANSETRON 4 MG/1
4 TABLET, ORALLY DISINTEGRATING ORAL EVERY 4 HOURS PRN
Qty: 10 TABLET | Refills: 0 | Status: SHIPPED | OUTPATIENT
Start: 2022-10-04 | End: 2022-10-29

## 2022-10-04 RX ORDER — 0.9 % SODIUM CHLORIDE 0.9 %
1000 INTRAVENOUS SOLUTION INTRAVENOUS ONCE
Status: COMPLETED | OUTPATIENT
Start: 2022-10-04 | End: 2022-10-04

## 2022-10-04 RX ADMIN — ONDANSETRON 4 MG: 2 INJECTION INTRAMUSCULAR; INTRAVENOUS at 17:50

## 2022-10-04 RX ADMIN — SODIUM CHLORIDE 1000 ML: 9 INJECTION, SOLUTION INTRAVENOUS at 17:31

## 2022-10-04 ASSESSMENT — HEART SCORE: ECG: 0

## 2022-10-04 NOTE — ED TRIAGE NOTES
Patient arrives to ER from home with c/o chest pain. States she had similar chest pain yesterday but ut went away and she assumed it would be okay. Patient having the same pain today. Patient describes the pain as \"someone hitting her and knocking the breath [id number] of her\". A dull/ache. Patient states she also has POTS syndrome and did not know if this had something to do with that. EKG performed in triage.

## 2022-10-04 NOTE — ED NOTES
Discharge instructions provided to patient. Patient verbalizes understanding of d/c plan and follow up care. PIV removed. Patient ambulatory off unit to POV at this time with no further needs noted.       Purnima Salas RN  10/04/22 7751

## 2022-10-04 NOTE — ED PROVIDER NOTES
[address] [facility] ENCOUNTER      Pt Name: Keira Geller  MRN: [id number]  YOB: 1995  Date of evaluation: 10/4/2022  Provider: Aura Guadalupe [facility]       Chief Complaint   Patient presents with    Chest Pain         HISTORY OF PRESENT ILLNESS  (Location/Symptom, Timing/Onset, Context/Setting, Quality, Duration, Modifying Factors, Severity.)   Keira Geller is a 24 y.o. female who presents to the emergency department for chest pain. Patient states that she has been having some intermittent chest discomfort over the last couple of days. She states only last for couple of minutes and then go away but she describes it as sharp stabbing in nature. She states is more right in the center of her chest and does not radiate. She states there is nothing that she is done this made it any better or any worse. She states her technically no rhyme or reason to it because she can be sitting still have the symptoms are she can be doing activity and have the same sensations. She states that she did get mildly short of breath with it. She also had some nausea and vomiting. She complains of mild nausea now. She is not actively having chest pain at this time. Patient denies any headaches at the ordinary. Denies any sore throat or earache. Denies any loss of taste or smell. Was complaining some mild intermittent or shortness of breath. Also with chest pain. States she has had a mild cough over the last day or so but has been nonproductive. Denies any abdominal pain. However she has had couple episodes of nausea vomiting a day but has not been enough to keep her from not eating or drinking. Denies any body aches out of ordinary. Denies any numbness tingling or weakness. States that she has been able to eat and drink without any difficulty. Nursing notes were reviewed.     REVIEW OFSYSTEMS    (2-9 systems for level 4, 10 or more for level 5)   ROS:  General:  No fevers, no chills, no weakness  Cardiovascular:  +chest pain, no palpitations  Respiratory:  + shortness of breath-intermittent, +cough-dry, no wheezing  Gastrointestinal:  No pain, + nausea, +vomiting, no diarrhea  Musculoskeletal:  No muscle pain, no joint pain  Skin:  No rash, no easy bruising  Neurologic:  No speech problems, no headache, no extremity weakness  Psychiatric:  No anxiety  Genitourinary:  No dysuria, no hematuria    Except as noted above the remainder of the review of systems was reviewed and negative. PAST MEDICAL HISTORY     Past Medical History:   Diagnosis Date    Anxiety     Depression     Fibromyalgia     Migraine     Miscarriage     Ovarian cyst 2017    ruptured    POTS (postural orthostatic tachycardia syndrome)     Strep pharyngitis          SURGICAL HISTORY       Past Surgical History:   Procedure Laterality Date    ABDOMEN SURGERY      cleaned out drainage from an ovarian cyst rupture     SECTION      DENTAL SURGERY           CURRENT MEDICATIONS       Previous Medications    BISOPROLOL (ZEBETA) 5 MG TABLET    Take 5 mg by mouth daily    BUSPIRONE (BUSPAR) 15 MG TABLET    Take 15 mg by mouth 3 times daily    HYDROXYZINE (ATARAX) 50 MG TABLET    Take 50 mg by mouth every 6 hours as needed for Itching    LAMOTRIGINE (LAMICTAL) 100 MG TABLET    Take 100 mg by mouth nightly    MUPIROCIN (BACTROBAN) 2 % OINTMENT    Apply topically 3 times daily. NORGESTREL-ETHINYL ESTRADIOL (LOW-OGESTREL) 0.3-30 MG-MCG PER TABLET    Take 1 tablet by mouth daily    OMEPRAZOLE (PRILOSEC) 40 MG DELAYED RELEASE CAPSULE    Take 40 mg by mouth daily    TOPIRAMATE (TOPAMAX PO)    Take 150 mg by mouth nightly     TRAZODONE (DESYREL) 50 MG TABLET    Take 50 mg by mouth nightly 1-2 nightly       ALLERGIES     Zoloft [sertraline]    FAMILY HISTORY     History reviewed. No pertinent family history.        SOCIAL HISTORY       Social History     Socioeconomic History    Marital status:      Spouse name: None    Number of children: None    Years of education: None    Highest education level: None   Tobacco Use    Smoking status: Never    Smokeless tobacco: Never   Vaping Use    Vaping Use: Never used   Substance and Sexual Activity    Alcohol use: No     Comment: rare    Drug use: No    Sexual activity: Yes         PHYSICAL EXAM    (up to 7 for level 4, 8 or more for level 5)     ED Triage Vitals 10/04/22 1643   BP Temp Temp Source Heart Rate Resp SpO2 Height Weight   115/65 98.4 °F (36.9 °C) Oral 84 20 99 % 5' 7\" (1.702 m) 162 lb (73.5 kg)       Physical Exam  General :Patient is awake, alert, oriented, in no acute distress, nontoxic appearing  HEENT: Pupils are equally round and reactive to light, EOMI, conjunctivae clear. Oral mucosa is moist, no exudate. Uvula is midline, no erythema noted to the posterior pharynx. No asymmetrical tissue. Cardiac: Heart regular rate, rhythm, no murmurs, rubs, or gallops  Lungs: Lungs are clear to auscultation, there is no wheezing, rhonchi, or rales. There is no use of accessory muscles. Abdomen: Abdomen is soft, nontender, nondistended. There is no firm or pulsatile masses, no rebound rigidity or guarding. Musculoskeletal: No focal muscle deficits are appreciated  Neuro: Motor intact, sensory intact, level of consciousness is normal  Dermatology: Skin is warm and dry  Psych: Mentation is grossly normal, cognition is grossly normal. Affect is appropriate. DIAGNOSTIC RESULTS     EKG: All EKG's are interpreted by the Emergency Department Physician who either signs or Co-signs this chart in the [address] a cardiologist.    The EKG interpreted by me shows sinus rhythm. Heart rate is 88 bpm, [place] interval is 152 ms, QRS durations 83, QT is 352 ms, QTC is 427 ms. No acute ST depressions concerning for acute myocardial ischemia. No acute ST elevations concerning for acute myocardial infarction.     RADIOLOGY:   Non-plain film images such as CT, Ultrasound and MRI are read by the radiologist. Plain radiographic images are visualized and preliminarily interpreted by the emergency physician with the below findings:      Radiologist's Report Reviewed:  XR CHEST PORTABLE   Final Result      1.  Negative portable chest.               ED BEDSIDE ULTRASOUND:   Performed by ED Physician - none    LABS:    I have reviewed and interpreted all of the currently available lab results from this visit (ifapplicable):  Results for orders placed or performed during the hospital encounter of 10/04/22   COVID-19, Rapid    Specimen: Nasopharyngeal Swab   Result Value Ref Range    SARS-CoV-2, NAAT Not Detected Not Detected   Pregnancy, Urine   Result Value Ref Range    HCG(Urine) Pregnancy Test Negative Detects HCG level >20 MIU/mL   CBC with Auto Differential   Result Value Ref Range    WBC 9.9 4.0 - 11.0 K/uL    RBC 4.38 3.80 - 5.80 M/uL    Hemoglobin 12.7 11.5 - 16.5 g/dL    Hematocrit 37.7 37.0 - 47.0 %    MCV 86.1 80.0 - 100.0 fL    MCH 29.0 27.0 - 32.0 pg    MCHC 33.7 31.0 - 35.0 g/dL    RDW 12.1 11.0 - 16.0 %    Platelets 094 437 - 711 K/uL    MPV 10.7 (H) 6.0 - 10.0 fL    Neutrophils % 72.8 %    Immature Granulocytes % 0.4 0.0 - 5.0 %    Lymphocytes % 16.8 %    Monocytes % 8.5 %    Eosinophils % 1.1 %    Basophils % 0.4 %    Neutrophils Absolute 7.2 2.0 - 7.5 K/uL    Immature Granulocytes # 0.0 K/uL    Lymphocytes Absolute 1.7 1.5 - 4.0 K/uL    Monocytes Absolute 0.8 0.2 - 0.8 K/uL    Eosinophils Absolute 0.1 0.0 - 0.4 K/uL    Basophils Absolute 0.0 0.0 - 0.1 K/uL   Comprehensive Metabolic Panel   Result Value Ref Range    Sodium 137 136 - 145 mmol/L    Potassium 3.5 3.4 - 5.1 mmol/L    Chloride 101 98 - 107 mmol/L    CO2 26 20 - 30 mmol/L    Anion Gap 10 3 - 16    Glucose 109 (H) 74 - 106 mg/dL    BUN 8 6 - 20 mg/dL    Creatinine 0.8 0.4 - 1.2 mg/dL    GFR Non-African American >60 >59    GFR African American >59 >59    Calcium 8.9 8.5 - 10.5 mg/dL    Total Protein 6.6 6.4 - 8.3 g/dL    Albumin 4.0 3.4 - 4.8 g/dL    Albumin/Globulin Ratio 1.5 0.8 - 2.0    Total Bilirubin 0.4 0.3 - 1.2 mg/dL    Alkaline Phosphatase 84 25 - 100 U/L    ALT 6 4 - 36 U/L    AST 9 8 - 33 U/L    Globulin 2.6 Not Established g/dL   Troponin   Result Value Ref Range    Troponin <0.30 <0.30 ng/mL        All other labs were within normal range or not returned as of this dictation. EMERGENCY DEPARTMENT COURSE and DIFFERENTIAL DIAGNOSIS/MDM:   Vitals:    Vitals:    10/04/22 1643   BP: 115/65   Pulse: 84   Resp: 20   Temp: 98.4 °F (36.9 °C)   TempSrc: Oral   SpO2: 99%   Weight: 162 lb (73.5 kg)   Height: 5' 7\" (1.702 m)       MEDICATIONS ADMINISTERED IN ED:  Medications   0.9 % sodium chloride bolus (1,000 mLs IntraVENous New Bag 10/4/22 5891)   ondansetron (ZOFRAN) injection 4 mg (4 mg IntraVENous Given 10/4/22 1750)       After initial evaluation examination I did have conversation with the patient about the upcoming plan, treatment possible disposition which they are agreeable to the times dictation. Advised we give her some Zofran here for nausea. She also received fluid bolus. We will do orthostatic vital signs her symptoms could still to be related to her pots disorder. Orthostatic vital signs were reviewed please see nurses note for full details but she was partially orthostatic with elevated heart rate especially with standing position which could be secondary to her POTS but we would definitely still want to give her fluid bolus. Patient will have rapid COVID screen performed. She states that she has she has had history of irregular periods and does not use protection so we will also check a urine pregnancy. Patient will have CBC, CMP, troponin and a twelve-lead EKG performed. Patient's final disposition will be determined once her radiological and diagnostic studies been performed reviewed.   She will also have portable chest radiograph performed for her chest discomfort however likelihood of a DVT or blood clot was statistically be low since she has not tachycardic tachypneic or hypoxic. Rapid COVID screen was negative    Blood work showed white count is 9900, hemoglobin 12.7, hematocrit 37.7, platelet counts 461. Comprehensive metabolic panel was benign except for glucose 109 mildly elevated. Troponin was nondetectable less than 0.30. Urine pregnancy negative    Portable chest radiograph read by radiology as negative portable chest    Patient's radiological diagnostic studies were discussed with her she does state her understanding. Patient advised of the findings here being benign. Patient advised negative work-up here. We also discussed her heart score which is less than 3 which places her in the low statistical likelihood of a major acute cardiac event within the next 6weeks. Patient advised that she is at a 2.5% chance of this which people with a heart score 3 or less are considered safe to be discharged home with follow-up. She is agreeable to this plan. Advised that I cannot tell her exactly what is causing the intermittent chest discomfort that she is having based on findings here today the likelihood of being cardiological is statistically low and less its part of her POTS disorder because she did comes mildly tachycardic from going from lying to sitting to standing position she was given fluids here for this. Otherwise I do believe the patient is a stable to be discharged home. History: 0  EC  Patient Age: 2  *Risk factors for Atherosclerotic disease: Positive family History  Risk Factors: 1  Troponin: 0  Heart Score Total: 1       The patient advised they do need to follow-up with a regular family physician within the next 1 to 2 days for evaluation.   They are also given instruction of the symptoms worsens or new symptoms arise they should return back to emergency department for further evaluation work-up    Is this patient to be included in the SEP-1 Core Measure due to severe sepsis or septic shock? No   Exclusion criteria - the patient is NOT to be included for SEP-1 Core Measure due to: Infection is not suspected          CONSULTS:  None    PROCEDURES:  Procedures    CRITICAL CARE TIME    Total Critical Care time was 0 minutes, excluding separately reportable procedures. There was a high probability of clinically significant/life threatening deterioration in the patient's condition which required my urgent intervention. FINAL IMPRESSION      1. Chest pain, unspecified type          DISPOSITION/PLAN   DISPOSITION Discharge - Pending Orders Complete 10/04/2022 06:22:30 PM      PATIENT REFERRED TO:  Melton Boast, APRN - [address]  [phone]    In 2 days      [facility] Emergency Department  [facility] [address] [facility]  [phone]    As needed, If symptoms worsen    DISCHARGE MEDICATIONS:  New Prescriptions    ONDANSETRON (ZOFRAN ODT) 4 MG DISINTEGRATING TABLET    Take 1 tablet by mouth every 4 hours as needed for Nausea or Vomiting       Comment: Please note this report has been produced using speech recognition software and may contain errorsrelated to that system including errors in grammar, punctuation, and spelling, as well as words and phrases that may be inappropriate. If there are any questions or concerns please feel free to contact the dictating providerfor clarification.     Digna Bernardo DO  Attending Emergency Physician               Digna Bernardo DO  10/04/22 2634

## 2022-10-05 ENCOUNTER — HOSPITAL ENCOUNTER (EMERGENCY)
Facility: HOSPITAL | Age: 27
Discharge: HOME | End: 2022-10-05
Attending: EMERGENCY MEDICINE
Payer: MEDICAID

## 2022-10-05 ENCOUNTER — TELEPHONE (OUTPATIENT)
Dept: CARDIOLOGY | Facility: CLINIC | Age: 27
End: 2022-10-05

## 2022-10-05 VITALS
DIASTOLIC BLOOD PRESSURE: 66 MMHG | HEART RATE: 62 BPM | OXYGEN SATURATION: 99 % | TEMPERATURE: 97.9 F | HEIGHT: 67 IN | SYSTOLIC BLOOD PRESSURE: 117 MMHG | BODY MASS INDEX: 25.43 KG/M2 | WEIGHT: 162 LBS | RESPIRATION RATE: 19 BRPM

## 2022-10-05 DIAGNOSIS — R07.89 CHEST WALL PAIN: Primary | ICD-10-CM

## 2022-10-05 LAB
D DIMER: 0.3 UG/ML FEU (ref 0–0.6)
TROPONIN: <0.3 NG/ML

## 2022-10-05 PROCEDURE — 84484 ASSAY OF TROPONIN QUANT: CPT

## 2022-10-05 PROCEDURE — 93005 ELECTROCARDIOGRAM TRACING: CPT

## 2022-10-05 PROCEDURE — 36415 COLL VENOUS BLD VENIPUNCTURE: CPT

## 2022-10-05 PROCEDURE — 85379 FIBRIN DEGRADATION QUANT: CPT

## 2022-10-05 PROCEDURE — 94761 N-INVAS EAR/PLS OXIMETRY MLT: CPT

## 2022-10-05 PROCEDURE — 99284 EMERGENCY DEPT VISIT MOD MDM: CPT

## 2022-10-05 ASSESSMENT — PAIN DESCRIPTION - PAIN TYPE: TYPE: ACUTE PAIN

## 2022-10-05 ASSESSMENT — PAIN SCALES - GENERAL: PAINLEVEL_OUTOF10: 7

## 2022-10-05 ASSESSMENT — PAIN DESCRIPTION - DESCRIPTORS: DESCRIPTORS: SQUEEZING

## 2022-10-05 ASSESSMENT — PAIN DESCRIPTION - LOCATION: LOCATION: CHEST

## 2022-10-05 ASSESSMENT — PAIN DESCRIPTION - ORIENTATION: ORIENTATION: MID

## 2022-10-05 ASSESSMENT — LIFESTYLE VARIABLES
HOW OFTEN DO YOU HAVE A DRINK CONTAINING ALCOHOL: NEVER
HOW MANY STANDARD DRINKS CONTAINING ALCOHOL DO YOU HAVE ON A TYPICAL DAY: PATIENT DOES NOT DRINK

## 2022-10-05 ASSESSMENT — PAIN - FUNCTIONAL ASSESSMENT: PAIN_FUNCTIONAL_ASSESSMENT: 0-10

## 2022-10-05 ASSESSMENT — PAIN DESCRIPTION - FREQUENCY: FREQUENCY: INTERMITTENT

## 2022-10-05 NOTE — ED PROVIDER NOTES
[address] ENCOUNTER      Pt Name: Martha Belcher  MRN: [id number]  YOB: 1995  Date of evaluation: 10/5/2022  Provider: Sundeep Pollock MD    CHIEF COMPLAINT       Chief Complaint   Patient presents with    Chest Pain     Patient c/o substernal chest pain, was seen here for same symptoms earlier today. HISTORY OF PRESENT ILLNESS  (Location/Symptom, Timing/Onset, Context/Setting, Quality, Duration, Modifying Factors, Severity.)   Martha Belcher is a 24 y.o. female who presents to the emergency department for chest wall pain that she has had all day and she noted that it was worse before she went to bed. Patient states the pain is reproducible on palpation or changes in position. Patient states that she felt that sharp pain before she went to bed and made her anxious so she presented to the emergency department for further evaluation. When patient was seen earlier today she had negative cardiac work-up was instructed to follow-up with her primary physician and cardiologist.  Patient states that she felt anxious and wanted to have it rechecked so she presented to the emergency department      Nursing notes were reviewed. REVIEW OFSYSTEMS    (2-9 systems for level 4, 10 or more for level 5)   ROS:  General:  No fevers, no chills, no weakness  Cardiovascular: Reproducible chest wall pain  Respiratory:  No shortness of breath, no cough, no wheezing  Gastrointestinal:  No pain, no nausea, no vomiting, no diarrhea  Musculoskeletal:  No muscle pain, no joint pain  Skin:  No rash, no easy bruising  Neurologic:  No speech problems, no headache, no extremity weakness  Psychiatric:  No anxiety  Genitourinary:  No dysuria, no hematuria    Except as noted above the remainder of the review of systems was reviewed and negative.        PAST MEDICAL HISTORY     Past Medical History:   Diagnosis Date    Anxiety     Depression Fibromyalgia     Migraine     Miscarriage     Ovarian cyst 2017    ruptured    POTS (postural orthostatic tachycardia syndrome)     Strep pharyngitis          SURGICAL HISTORY       Past Surgical History:   Procedure Laterality Date    ABDOMEN SURGERY      cleaned out drainage from an ovarian cyst rupture     SECTION      DENTAL SURGERY           CURRENT MEDICATIONS       Previous Medications    BISOPROLOL (ZEBETA) 5 MG TABLET    Take 5 mg by mouth daily    BUSPIRONE (BUSPAR) 15 MG TABLET    Take 15 mg by mouth 3 times daily    HYDROXYZINE (ATARAX) 50 MG TABLET    Take 50 mg by mouth every 6 hours as needed for Itching    LAMOTRIGINE (LAMICTAL) 100 MG TABLET    Take 100 mg by mouth nightly    MUPIROCIN (BACTROBAN) 2 % OINTMENT    Apply topically 3 times daily. OMEPRAZOLE (PRILOSEC) 40 MG DELAYED RELEASE CAPSULE    Take 40 mg by mouth daily    ONDANSETRON (ZOFRAN ODT) 4 MG DISINTEGRATING TABLET    Take 1 tablet by mouth every 4 hours as needed for Nausea or Vomiting    TOPIRAMATE (TOPAMAX PO)    Take 150 mg by mouth nightly     TRAZODONE (DESYREL) 50 MG TABLET    Take 50 mg by mouth nightly 1-2 nightly       ALLERGIES     Zoloft [sertraline]    FAMILY HISTORY     History reviewed. No pertinent family history.        SOCIAL HISTORY       Social History     Socioeconomic History    Marital status:      Spouse name: None    Number of children: None    Years of education: None    Highest education level: None   Tobacco Use    Smoking status: Never    Smokeless tobacco: Never   Vaping Use    Vaping Use: Some days    Substances: Nicotine   Substance and Sexual Activity    Alcohol use: No     Comment: rare    Drug use: No    Sexual activity: Yes         PHYSICAL EXAM    (up to 7 for level 4, 8 or more for level 5)     ED Triage Vitals [10/05/22 0014]   BP Temp Temp Source Heart Rate Resp SpO2 Height Weight   112/80 97.9 °F (36.6 °C) Oral 78 18 100 % 5' 7\" (1.702 m) 162 lb (73.5 kg)       Physical Exam  General :Patient is awake, alert, oriented, in no acute distress, nontoxic appearing  HEENT: Pupils are equally round and reactive to light, EOMI, conjunctivae clear. Oral mucosa is moist, no exudate. Uvula is midline  Neck: Neck is supple, full range of motion, trachea midline  Cardiac: Heart regular rate, rhythm, no murmurs, rubs, or gallops  Lungs: Lungs are clear to auscultation, there is no wheezing, rhonchi, or rales. There is no use of accessory muscles. Chest wall: There is reproducible chest wall pain on palpation that recreates his symptoms have brought the patient into the emergency department. Abdomen: Abdomen is soft, nontender, nondistended. There is no firm or pulsatile masses, no rebound rigidity or guarding. Musculoskeletal: 5 out of 5 strength in all 4 extremities. No focal muscle deficits are appreciated  Neuro: Motor intact, sensory intact, level of consciousness is normal, cerebellar function is normal, reflexes are grossly normal. No evidence of incontinence or loss of bowel or bladder function, no saddle anesthesia noted   Dermatology: Skin is warm and dry  Psych: Mentation is grossly normal, cognition is grossly normal. Affect is appropriate.       DIAGNOSTIC RESULTS     EKG: All EKG's are interpreted by the Emergency Department Physician who either signs or Co-signs this chart in the [address] a cardiologist.    The EKG interpreted by me shows normal sinus rhythm rate of 75 per EDMD.    RADIOLOGY:   Non-plain film images such as CT, Ultrasound and MRI are read by the radiologist. Plain radiographic images are visualized and preliminarily interpreted by the emergency physician with the below findings:      [] Radiologist's Report Reviewed:  No orders to display         ED BEDSIDE ULTRASOUND:   Performed by ED Physician - none    LABS:    I have reviewed and interpreted all of the currently available lab results from this visit (ifapplicable):  Results for orders placed or performed during the hospital encounter of 10/05/22   Troponin   Result Value Ref Range    Troponin <0.30 <0.30 ng/mL   D-Dimer   Result Value Ref Range    D-Dimer, Quant 0.30 0.00 - 0.60 ug/mL FEU        All other labs were within normal range or not returned as of this dictation. EMERGENCY DEPARTMENT COURSE and DIFFERENTIAL DIAGNOSIS/MDM:   Vitals:    Vitals:    10/05/22 0014   BP: 112/80   Pulse: 78   Resp: 18   Temp: 97.9 °F (36.6 °C)   TempSrc: Oral   SpO2: 100%   Weight: 162 lb (73.5 kg)   Height: 5' 7\" (1.702 m)       MEDICATIONS ADMINISTERED IN ED:  Medications - No data to display    Patient was placed in examination room at which time after exam was performed EKG was obtained which showed normal sinus rhythm rate of 75 per EDMD.  Patient's labs were reviewed from earlier today when she saw Dr. Kyle León. Prior chest x-ray was within normal limits. Troponin and D-dimer were negative. Results reviewed with patient as instructed follow-up with primary physician and her cardiologist for continued adjustment of her medication for her known POTS syndrome. Patient was appreciative the care and agrees with plan above      The patient will follow-up with their PCP in 1-2 days for reevaluation. If the patient or family members have anyfurther concerns or any worsening symptoms they will return to the ED for reevaluation. Is this patient to be included in the SEP-1 Core Measure due to severe sepsis or septic shock? No   Exclusion criteria - the patient is NOT to be included for SEP-1 Core Measure due to:  2+ SIRS criteria are not met          CONSULTS:  None    PROCEDURES:  Procedures    CRITICAL CARE TIME    Total Critical Care time was 0 minutes, excluding separately reportable procedures. There was a high probability of clinically significant/life threatening deterioration in the patient's condition which required my urgent intervention. FINAL IMPRESSION      1.  Chest wall pain Stable         DISPOSITION/PLAN DISPOSITION Decision To Discharge 10/05/2022 01:16:21 AM      PATIENT REFERRED TO:  JESSICA Cloud - [address]  [phone]    Schedule an appointment as soon as possible for a visit in 2 days      [facility] Emergency Department  [address] [facility]  [phone]  In 2 days  If symptoms worsen    DISCHARGE MEDICATIONS:  New Prescriptions    No medications on file       Comment: Please note this report has been produced using speech recognition software and may contain errorsrelated to that system including errors in grammar, punctuation, and spelling, as well as words and phrases that may be inappropriate. If there are any questions or concerns please feel free to contact the dictating providerfor clarification.     Raquel Mercado MD  Attending Emergency Physician               Raquel Mercado MD  10/05/22 4229

## 2022-10-05 NOTE — TELEPHONE ENCOUNTER
Patient over head paged for active chest pain. Patient states that she is in constant chest pain that is aggravated by moving and bending over. Patient has gone to [facility] ED twice due to this chest pain and has been sent home. Patient states that pain makes her dizzy and like someone is squeezing her heart.     Advised pt to come to [facility] ED to be assessed. Pt verbalizes understanding.

## 2022-10-11 ENCOUNTER — HOSPITAL ENCOUNTER (OUTPATIENT)
Facility: HOSPITAL | Age: 27
Discharge: HOME | End: 2022-10-11
Payer: MEDICAID

## 2022-10-11 PROCEDURE — 86780 TREPONEMA PALLIDUM: CPT

## 2022-10-11 PROCEDURE — 36415 COLL VENOUS BLD VENIPUNCTURE: CPT

## 2022-10-12 LAB — TOTAL SYPHILLIS IGG/IGM: NORMAL

## 2022-10-25 ENCOUNTER — APPOINTMENT (OUTPATIENT)
Dept: ULTRASOUND IMAGING | Facility: HOSPITAL | Age: 27
End: 2022-10-25
Payer: MEDICAID

## 2022-10-25 ENCOUNTER — HOSPITAL ENCOUNTER (EMERGENCY)
Facility: HOSPITAL | Age: 27
Discharge: HOME | End: 2022-10-25
Attending: EMERGENCY MEDICINE
Payer: MEDICAID

## 2022-10-25 VITALS
HEART RATE: 78 BPM | OXYGEN SATURATION: 98 % | DIASTOLIC BLOOD PRESSURE: 61 MMHG | TEMPERATURE: 98.3 F | HEIGHT: 67 IN | BODY MASS INDEX: 25.27 KG/M2 | SYSTOLIC BLOOD PRESSURE: 134 MMHG | RESPIRATION RATE: 16 BRPM | WEIGHT: 161 LBS

## 2022-10-25 DIAGNOSIS — O03.9 MISCARRIAGE: Primary | ICD-10-CM

## 2022-10-25 LAB
A/G RATIO: 1.6 (ref 0.8–2)
ALBUMIN SERPL-MCNC: 4 G/DL (ref 3.4–4.8)
ALP BLD-CCNC: 80 U/L (ref 25–100)
ALT SERPL-CCNC: 8 U/L (ref 4–36)
ANION GAP SERPL CALCULATED.3IONS-SCNC: 8 MMOL/L (ref 3–16)
AST SERPL-CCNC: 11 U/L (ref 8–33)
BASOPHILS ABSOLUTE: 0.1 K/UL (ref 0–0.1)
BASOPHILS RELATIVE PERCENT: 0.6 %
BILIRUB SERPL-MCNC: 0.3 MG/DL (ref 0.3–1.2)
BUN BLDV-MCNC: 8 MG/DL (ref 6–20)
CALCIUM SERPL-MCNC: 9 MG/DL (ref 8.5–10.5)
CHLORIDE BLD-SCNC: 103 MMOL/L (ref 98–107)
CO2: 29 MMOL/L (ref 20–30)
CREAT SERPL-MCNC: 0.8 MG/DL (ref 0.4–1.2)
EOSINOPHILS ABSOLUTE: 0.2 K/UL (ref 0–0.4)
EOSINOPHILS RELATIVE PERCENT: 1.8 %
GFR SERPL CREATININE-BSD FRML MDRD: >60 ML/MIN/{1.73_M2}
GLOBULIN: 2.5 G/DL
GLUCOSE BLD-MCNC: 99 MG/DL (ref 74–106)
GONADOTROPIN, CHORIONIC (HCG) QUANT: 62.1 MIU/ML
HCT VFR BLD CALC: 38.5 % (ref 37–47)
HEMOGLOBIN: 12.6 G/DL (ref 11.5–16.5)
IMMATURE GRANULOCYTES #: 0 K/UL
IMMATURE GRANULOCYTES %: 0.2 % (ref 0–5)
LYMPHOCYTES ABSOLUTE: 2.3 K/UL (ref 1.5–4)
LYMPHOCYTES RELATIVE PERCENT: 27.5 %
MCH RBC QN AUTO: 28.5 PG (ref 27–32)
MCHC RBC AUTO-ENTMCNC: 32.7 G/DL (ref 31–35)
MCV RBC AUTO: 87.1 FL (ref 80–100)
MONOCYTES ABSOLUTE: 0.7 K/UL (ref 0.2–0.8)
MONOCYTES RELATIVE PERCENT: 8.2 %
NEUTROPHILS ABSOLUTE: 5.1 K/UL (ref 2–7.5)
NEUTROPHILS RELATIVE PERCENT: 61.7 %
PDW BLD-RTO: 12.1 % (ref 11–16)
PLATELET # BLD: 266 K/UL (ref 150–400)
PMV BLD AUTO: 10.6 FL (ref 6–10)
POTASSIUM SERPL-SCNC: 3.9 MMOL/L (ref 3.4–5.1)
RBC # BLD: 4.42 M/UL (ref 3.8–5.8)
SODIUM BLD-SCNC: 140 MMOL/L (ref 136–145)
TOTAL PROTEIN: 6.5 G/DL (ref 6.4–8.3)
WBC # BLD: 8.3 K/UL (ref 4–11)

## 2022-10-25 PROCEDURE — 80053 COMPREHEN METABOLIC PANEL: CPT

## 2022-10-25 PROCEDURE — 85025 COMPLETE CBC W/AUTO DIFF WBC: CPT

## 2022-10-25 PROCEDURE — 76815 OB US LIMITED FETUS(S): CPT

## 2022-10-25 PROCEDURE — 99284 EMERGENCY DEPT VISIT MOD MDM: CPT

## 2022-10-25 PROCEDURE — 94761 N-INVAS EAR/PLS OXIMETRY MLT: CPT

## 2022-10-25 PROCEDURE — 36415 COLL VENOUS BLD VENIPUNCTURE: CPT

## 2022-10-25 PROCEDURE — 84702 CHORIONIC GONADOTROPIN TEST: CPT

## 2022-10-25 ASSESSMENT — PAIN DESCRIPTION - LOCATION: LOCATION: ABDOMEN

## 2022-10-25 ASSESSMENT — PAIN DESCRIPTION - DESCRIPTORS: DESCRIPTORS: CRAMPING

## 2022-10-25 ASSESSMENT — PAIN - FUNCTIONAL ASSESSMENT: PAIN_FUNCTIONAL_ASSESSMENT: 0-10

## 2022-10-25 ASSESSMENT — PAIN DESCRIPTION - PAIN TYPE: TYPE: ACUTE PAIN

## 2022-10-25 ASSESSMENT — PAIN SCALES - GENERAL: PAINLEVEL_OUTOF10: 3

## 2022-10-25 ASSESSMENT — PAIN DESCRIPTION - ORIENTATION: ORIENTATION: LOWER

## 2022-10-25 NOTE — ED NOTES
Spoke with Radha Rahman from Lab. She will come perform straight stick blood draw since ED is busy.       Monica Holcomb RN  10/25/22 6168

## 2022-10-25 NOTE — ED PROVIDER NOTES
[facility] [address]      Pt Name: Eber Louis  MRN: [id number]  YOB: 1995  Date of evaluation: 10/25/2022  Provider: Caitlin Velez MD    [address] [place] [address]       Chief Complaint   Patient presents with    Vaginal Bleeding     Positive pregnancy test last week           HISTORY OF PRESENT ILLNESS  (Location/Symptom, Timing/Onset, Context/Setting, Quality, Duration, Modifying Factors, Severity.)   Eber Louis is a 24 y.o. female who presents to the emergency department complaining of being about 8 weeks pregnant with the onset of vaginal bleeding and lower abdominal cramping about 2 hours ago she is not even soaked 1 pad at this point but states she is concerned about miscarriage since she has had 2 in the past she denies any nausea vomiting or diarrhea she denies any vaginal discharge prior to the bleeding starting she is G5, P2 Ab2. Nursing notes were reviewed. REVIEW OFSYSTEMS    (2-9 systems for level 4, 10 or more for level 5)   ROS:  General:  No fevers, no chills, no weakness  Cardiovascular:  No chest pain, no palpitations  Respiratory:  No shortness of breath, no cough, no wheezing  Gastrointestinal:  + pain, no nausea, no vomiting, no diarrhea  Musculoskeletal:  No muscle pain, no joint pain  Skin:  No rash, no easy bruising  Neurologic:  No speech problems, no headache, no extremity weakness  Psychiatric:  No anxiety  Genitourinary:  No dysuria, no hematuria    Except as noted above the remainder of the review of systems was reviewed and negative.        PAST MEDICAL HISTORY     Past Medical History:   Diagnosis Date    Anxiety     Depression     Fibromyalgia     Migraine     Miscarriage     Ovarian cyst 12/11/2017    ruptured    POTS (postural orthostatic tachycardia syndrome)     Strep pharyngitis          SURGICAL HISTORY       Past Surgical History:   Procedure Laterality Date    ABDOMEN SURGERY cleaned out drainage from an ovarian cyst rupture     SECTION      DENTAL SURGERY           CURRENT MEDICATIONS       Previous Medications    BISOPROLOL (ZEBETA) 5 MG TABLET    Take 5 mg by mouth daily    BUSPIRONE (BUSPAR) 15 MG TABLET    Take 15 mg by mouth 3 times daily    HYDROXYZINE (ATARAX) 50 MG TABLET    Take 50 mg by mouth every 6 hours as needed for Itching    LAMOTRIGINE (LAMICTAL) 100 MG TABLET    Take 100 mg by mouth nightly    MUPIROCIN (BACTROBAN) 2 % OINTMENT    Apply topically 3 times daily. OMEPRAZOLE (PRILOSEC) 40 MG DELAYED RELEASE CAPSULE    Take 40 mg by mouth daily    ONDANSETRON (ZOFRAN ODT) 4 MG DISINTEGRATING TABLET    Take 1 tablet by mouth every 4 hours as needed for Nausea or Vomiting    TOPIRAMATE (TOPAMAX PO)    Take 150 mg by mouth nightly     TRAZODONE (DESYREL) 50 MG TABLET    Take 50 mg by mouth nightly 1-2 nightly       ALLERGIES     Zoloft [sertraline]    FAMILY HISTORY     History reviewed. No pertinent family history. SOCIAL HISTORY       Social History     Socioeconomic History    Marital status:      Spouse name: None    Number of children: None    Years of education: None    Highest education level: None   Tobacco Use    Smoking status: Never    Smokeless tobacco: Never   Vaping Use    Vaping Use: Some days    Substances: Nicotine   Substance and Sexual Activity    Alcohol use: No     Comment: rare    Drug use: No    Sexual activity: Yes         PHYSICAL EXAM    (up to 7 for level 4, 8 or more for level 5)     ED Triage Vitals [10/25/22 1559]   BP Temp Temp Source Heart Rate Resp SpO2 Height Weight   117/75 98.3 °F (36.8 °C) Oral 85 16 98 % 5' 7\" (1.702 m) 161 lb (73 kg)       Physical Exam  General :Patient is awake, alert, oriented, in no acute distress, nontoxic appearing  HEENT: Pupils are equally round and reactive to light, EOMI, conjunctivae clear.  Neck: Neck is supple, full range of motion, trachea midline  Cardiac: Heart regular rate, rhythm, no murmurs, rubs, or gallops  Lungs: Lungs are clear to auscultation, there is no wheezing, rhonchi, or rales. There is no use of accessory muscles. Chest wall: There is no tenderness to palpation over the chest wall or over ribs  Abdomen: Abdomen is soft, nontender, nondistended. There is no firm or pulsatile masses, no rebound rigidity or guarding. Musculoskeletal: 5 out of 5 strength in all 4 extremities. No focal muscle deficits are appreciated  Neuro: Motor intact, sensory intact, level of consciousness is normal,   Dermatology: Skin is warm and dry  Psych: Mentation is grossly normal, cognition is grossly normal. Affect is appropriate. DIAGNOSTIC RESULTS     EKG: All EKG's are interpreted by the Emergency Department Physician who either signs or Co-signs this chart in the [address] a cardiologist.    The EKG interpreted by me shows     RADIOLOGY:   Non-plain film images such as CT, Ultrasound and MRI are read by the radiologist. Plain radiographic images are visualized and preliminarily interpreted by the emergency physician with the below findings:      [] Radiologist's Report Reviewed:  US OB 1 OR MORE FETUS LIMITED   Final Result      Pregnancy of Unknown Location: No IUP visualized with normal appearance of the uterus and adnexal structures. Differential diagnosis includes early IUP, occult ectopic pregnancy, or completed spontaneous . Recommend serial beta-hCG and short term    repeat imaging for further characterization.                ED BEDSIDE ULTRASOUND:   Performed by ED Physician - none    LABS:    I have reviewed and interpreted all of the currently available lab results from this visit (ifapplicable):  Results for orders placed or performed during the hospital encounter of 10/25/22   CBC with Auto Differential   Result Value Ref Range    WBC 8.3 4.0 - 11.0 K/uL    RBC 4.42 3.80 - 5.80 M/uL    Hemoglobin 12.6 11.5 - 16.5 g/dL    Hematocrit 38.5 37.0 - 47.0 %    MCV 87.1 80.0 - 100.0 fL    MCH 28.5 27.0 - 32.0 pg    MCHC 32.7 31.0 - 35.0 g/dL    RDW 12.1 11.0 - 16.0 %    Platelets 750 728 - 988 K/uL    MPV 10.6 (H) 6.0 - 10.0 fL    Neutrophils % 61.7 %    Immature Granulocytes % 0.2 0.0 - 5.0 %    Lymphocytes % 27.5 %    Monocytes % 8.2 %    Eosinophils % 1.8 %    Basophils % 0.6 %    Neutrophils Absolute 5.1 2.0 - 7.5 K/uL    Immature Granulocytes # 0.0 K/uL    Lymphocytes Absolute 2.3 1.5 - 4.0 K/uL    Monocytes Absolute 0.7 0.2 - 0.8 K/uL    Eosinophils Absolute 0.2 0.0 - 0.4 K/uL    Basophils Absolute 0.1 0.0 - 0.1 K/uL   CMP   Result Value Ref Range    Sodium 140 136 - 145 mmol/L    Potassium 3.9 3.4 - 5.1 mmol/L    Chloride 103 98 - 107 mmol/L    CO2 29 20 - 30 mmol/L    Anion Gap 8 3 - 16    Glucose 99 74 - 106 mg/dL    BUN 8 6 - 20 mg/dL    Creatinine 0.8 0.4 - 1.2 mg/dL    Est, Glom Filt Rate >60 >59    Calcium 9.0 8.5 - 10.5 mg/dL    Total Protein 6.5 6.4 - 8.3 g/dL    Albumin 4.0 3.4 - 4.8 g/dL    Albumin/Globulin Ratio 1.6 0.8 - 2.0    Total Bilirubin 0.3 0.3 - 1.2 mg/dL    Alkaline Phosphatase 80 25 - 100 U/L    ALT 8 4 - 36 U/L    AST 11 8 - 33 U/L    Globulin 2.5 Not Established g/dL   HCG, Quantitative, Pregnancy   Result Value Ref Range    hCG Quant 62.1 <5.0 mIU/mL        All other labs were within normal range or not returned as of this dictation. EMERGENCY DEPARTMENT COURSE and DIFFERENTIAL DIAGNOSIS/MDM:   Vitals:    Vitals:    10/25/22 1559   BP: 117/75   Pulse: 85   Resp: 16   Temp: 98.3 °F (36.8 °C)   TempSrc: Oral   SpO2: 98%   Weight: 161 lb (73 kg)   Height: 5' 7\" (1.702 m)       MEDICATIONS ADMINISTERED IN ED:  Medications - No data to display    Has been stable beta-hCG level is down to 62.1 consistent with spontaneous  ultrasound shows no evidence of conception also consistent with a completed spontaneous . The patient will follow-up with their PCP in 1-2 days for reevaluation.   If the patient or family members have anyfurther concerns or any worsening symptoms they will return to the ED for reevaluation. Is this patient to be included in the SEP-1 Core Measure due to severe sepsis or septic shock? No   Exclusion criteria - the patient is NOT to be included for SEP-1 Core Measure due to: Alternative explanation for abnormal labs/vitals that do not relate to sepsis, see MDM for further explanation          CONSULTS:  None    PROCEDURES:  Procedures    CRITICAL CARE TIME    Total Critical Care time was 0 minutes, excluding separately reportable procedures. There was a high probability of clinically significant/life threatening deterioration in the patient's condition which required my urgent intervention. FINAL IMPRESSION      1. Miscarriage New Problem         DISPOSITION/PLAN   DISPOSITION Decision To Discharge 10/25/2022 06:39:38 PM  Stable discharge to home    PATIENT REFERRED TO:  Gynecologist    Schedule an appointment as soon as possible for a visit in 3 days      DISCHARGE MEDICATIONS:  New Prescriptions    No medications on file       Comment: Please note this report has been produced using speech recognition software and may contain errorsrelated to that system including errors in grammar, punctuation, and spelling, as well as words and phrases that may be inappropriate. If there are any questions or concerns please feel free to contact the dictating providerfor clarification.     Artemio Pompa MD  Attending Emergency Physician               Artemio Pompa MD  10/25/22 4124

## 2022-10-25 NOTE — ED NOTES
AVS reviewed with patient, understanding verbalized. Patient discharged home with no further needs or concerns. Patient encouraged to follow up with GYN and /or return to ED as needed.      Tiffany Laureano RN  10/25/22 4371

## 2022-10-25 NOTE — ED NOTES
Patient came to ER today with c/o vaginal bleeding. Pt states she was in urgent care approx. 1 week ago and they confirmed she was pregnant. Pt states around 1:45 today patient began bleeding and cramping. Pt is worried she may be having a miscarriage.        Williams Navarrete RN  10/25/22 8625

## 2022-10-26 ENCOUNTER — HOSPITAL ENCOUNTER (OUTPATIENT)
Facility: HOSPITAL | Age: 27
Discharge: HOME | End: 2022-10-26
Payer: MEDICAID

## 2022-10-26 LAB — GONADOTROPIN, CHORIONIC (HCG) QUANT: 68.3 MIU/ML

## 2022-10-26 PROCEDURE — 36415 COLL VENOUS BLD VENIPUNCTURE: CPT

## 2022-10-26 PROCEDURE — 84702 CHORIONIC GONADOTROPIN TEST: CPT

## 2022-10-29 ENCOUNTER — HOSPITAL ENCOUNTER (EMERGENCY)
Facility: HOSPITAL | Age: 27
Discharge: HOME | End: 2022-10-29
Attending: HOSPITALIST
Payer: MEDICAID

## 2022-10-29 VITALS
RESPIRATION RATE: 18 BRPM | HEART RATE: 86 BPM | BODY MASS INDEX: 25.84 KG/M2 | TEMPERATURE: 98.6 F | OXYGEN SATURATION: 98 % | WEIGHT: 165 LBS | DIASTOLIC BLOOD PRESSURE: 75 MMHG | SYSTOLIC BLOOD PRESSURE: 121 MMHG

## 2022-10-29 DIAGNOSIS — Z71.1 CONCERN ABOUT MISCARRIAGE WITHOUT DIAGNOSIS: ICD-10-CM

## 2022-10-29 DIAGNOSIS — O46.90 VAGINAL BLEEDING IN PREGNANCY: Primary | ICD-10-CM

## 2022-10-29 LAB
GONADOTROPIN, CHORIONIC (HCG) QUANT: 10.3 MIU/ML
RAPID INFLUENZA  B AGN: NEGATIVE
RAPID INFLUENZA A AGN: NEGATIVE
S PYO AG THROAT QL: NEGATIVE
SARS-COV-2, NAAT: NOT DETECTED

## 2022-10-29 PROCEDURE — 87635 SARS-COV-2 COVID-19 AMP PRB: CPT

## 2022-10-29 PROCEDURE — 84702 CHORIONIC GONADOTROPIN TEST: CPT

## 2022-10-29 PROCEDURE — 99283 EMERGENCY DEPT VISIT LOW MDM: CPT

## 2022-10-29 PROCEDURE — 87880 STREP A ASSAY W/OPTIC: CPT

## 2022-10-29 PROCEDURE — 87804 INFLUENZA ASSAY W/OPTIC: CPT

## 2022-10-29 ASSESSMENT — PAIN - FUNCTIONAL ASSESSMENT: PAIN_FUNCTIONAL_ASSESSMENT: 0-10

## 2022-10-29 ASSESSMENT — PAIN DESCRIPTION - DESCRIPTORS: DESCRIPTORS: ACHING

## 2022-10-29 ASSESSMENT — PAIN DESCRIPTION - LOCATION: LOCATION: BACK;LEG

## 2022-10-29 ASSESSMENT — PAIN SCALES - GENERAL: PAINLEVEL_OUTOF10: 6

## 2022-10-29 ASSESSMENT — PAIN DESCRIPTION - FREQUENCY: FREQUENCY: INTERMITTENT

## 2022-10-29 ASSESSMENT — PAIN DESCRIPTION - PAIN TYPE: TYPE: ACUTE PAIN

## 2022-10-29 NOTE — ED PROVIDER NOTES
[address] ENCOUNTER      Pt Name: Ashlee Prasad  MRN: [id number]  YOB: 1995  Date of evaluation: 10/29/2022  Provider: Naveed Qureshi Dr       Chief Complaint   Patient presents with    Headache    Fatigue    Vaginal Bleeding    Generalized Body Aches         HISTORY OF PRESENT ILLNESS  (Location/Symptom, Timing/Onset, Context/Setting, Quality, Duration, Modifying Factors, Severity.)   Ashlee Prasad is a 24 y.o. female who presents to the emergency department for multiple complaints but initially vaginal bleeding concern for possible miscarriage. She has also been having generalized body aches headaches fatigue which started the day prior. Patient was seen here back on 10/25/2022 for vaginal bleeding concern for possible miscarriage. She had a quant at that time of 62.1. She was discharged home she state her last menstrual period was September 13 so at best she could have been 5 weeks pregnant. Upon following up with her family physician the next day she had a repeat quant which come back at 68.3 and was advised that she was not having a miscarriage because her quant had went up however you should have doubling time over 48 hours. So she had had it rechecked in 24 hours so she should have it went up at least 30 points with a been half of the 60 so her quant should have been in the 90 range from normal progression during her pregnancy but she only went up a total of 6 points which is very concerning because that is almost plateaued or stable without an appropriate rise. She states that she is continue to have intermittent bleeding she is not really having much pain or discomfort to the abdominal area.   Patient was concerned because she is continue to have the symptoms so she came back to the emergency department today for evaluation she wanted to have a quant performed to see if she actually has a viable pregnancy or if there is chance that she is having miscarriage. Denies any sick contacts that she is aware of but she does have other symptoms with the headache the fatigue and the body aches that were concerning for possible COVID or influenza. She denies any nausea or vomiting with the symptoms. Headache is not the worst headache of her entire life. Denies any sore throat or earache. Denies any chest pain or shortness of breath. Denies any numbness tingling weakness of extremities. Denies any dysuria. Patient advised that she is still having wrong positive pregnancy test at home but advised that as long as her quant levels 5 or higher she will always have a positive pregnancy test.      Nursing notes were reviewed. REVIEW OFSYSTEMS    (2-9 systems for level 4, 10 or more for level 5)   ROS:  General:  No fevers, no chills, no weakness, + fatigue, + body aches  Cardiovascular:  No chest pain, no palpitations  Respiratory:  No shortness of breath, no cough, no wheezing  Gastrointestinal:  No pain, no nausea, no vomiting, no diarrhea  Musculoskeletal:  No muscle pain, no joint pain  Skin:  No rash, no easy bruising  Neurologic:  No speech problems, +headache, no extremity weakness  Psychiatric:  No anxiety  Genitourinary:  No dysuria, no hematuria, + vaginal bleeding with pregnancy    Except as noted above the remainder of the review of systems was reviewed and negative.        PAST MEDICAL HISTORY     Past Medical History:   Diagnosis Date    Anxiety     Depression     Fibromyalgia     Migraine     Miscarriage     Ovarian cyst 2017    ruptured    POTS (postural orthostatic tachycardia syndrome)     Strep pharyngitis          SURGICAL HISTORY       Past Surgical History:   Procedure Laterality Date    ABDOMEN SURGERY      cleaned out drainage from an ovarian cyst rupture     SECTION      DENTAL SURGERY           CURRENT MEDICATIONS       Previous Medications    No medications on file ALLERGIES     Zoloft [sertraline]    FAMILY HISTORY     History reviewed. No pertinent family history. SOCIAL HISTORY       Social History     Socioeconomic History    Marital status:      Spouse name: None    Number of children: None    Years of education: None    Highest education level: None   Tobacco Use    Smoking status: Never    Smokeless tobacco: Never   Vaping Use    Vaping Use: Former    Substances: Nicotine   Substance and Sexual Activity    Alcohol use: No     Comment: rare    Drug use: No    Sexual activity: Yes         PHYSICAL EXAM    (up to 7 for level 4, 8 or more for level 5)     ED Triage Vitals [10/29/22 1803]   BP Temp Temp Source Heart Rate Resp SpO2 Height Weight   124/89 98.6 °F (37 °C) Oral 86 18 99 % -- 165 lb (74.8 kg)       Physical Exam  General :Patient is awake, alert, oriented, in no acute distress, nontoxic appearing  HEENT: Pupils are equally round and reactive to light, EOMI  Cardiac: Heart regular rate, rhythm, no murmurs, rubs, or gallops  Lungs: Lungs are clear to auscultation, there is no wheezing, rhonchi, or rales. There is no use of accessory muscles. Abdomen: Abdomen is soft, nontender, nondistended. There is no firm or pulsatile masses, no rebound rigidity or guarding. Musculoskeletal: No focal muscle deficits are appreciated  Neuro: Motor intact, sensory intact, level of consciousness is normal  Dermatology: Skin is warm and dry  Psych: Mentation is grossly normal, cognition is grossly normal. Affect is appropriate. DIAGNOSTIC RESULTS     EKG: All EKG's are interpreted by the Emergency Department Physician who either signs or Co-signs this chart in the [address] a cardiologist.        RADIOLOGY:   Non-plain film images such as CT, Ultrasound and MRI are read by the radiologist. Plain radiographic images are visualized and preliminarily interpreted by the emergency physician with the below findings:      [] Radiologist's Report Reviewed:   No orders to display         ED BEDSIDE ULTRASOUND:   Performed by ED Physician - none    LABS:    I have reviewed and interpreted all of the currently available lab results from this visit (ifapplicable):  No results found for this visit on 10/29/22. All other labs were within normal range or not returned as of this dictation. EMERGENCY DEPARTMENT COURSE and DIFFERENTIAL DIAGNOSIS/MDM:   Vitals:    Vitals:    10/29/22 1803   BP: 124/89   Pulse: 86   Resp: 18   Temp: 98.6 °F (37 °C)   TempSrc: Oral   SpO2: 99%   Weight: 165 lb (74.8 kg)       MEDICATIONS ADMINISTERED IN ED:  Medications - No data to display    After initial evaluation examination I did have conversation with the patient about the upcoming plan, treatment possible disposition which they are agreeable to the times dictation. Willie we can check a rapid COVID, strep and influenza screen because of her other symptoms that she is having besides her vaginal bleeding but we will recheck a hCG to see what her quant level is. If it is less are still stable she was previously there is concern for threatened miscarriage or actual miscarriage. If it is slightly higher than what she was before she definitely should have met doubling time since the last time that she had this checked so of her Blase Matthew has not doubled to the appropriate level there is also concerned that is not progressing the way that should and can still be considered a threatened miscarriage. Patient's final disposition will determine once her diagnostic studies been performed reviewed. Patient's resting comfortably stretcher no acute distress nontoxic-appearing. Patient's final disposition would not be determined prior to shift change. Disposition return by the oncoming physician. 19:00p.m. I have signed out [address] [place] [address] Emergency Department care to Dr. Monique Adames.  We discussed the pertinent history, physical exam, completed/pending test results (if applicable) and current treatment plan. Please refer to his/her chart for the patients remaining Emergency Department course and final disposition. Is this patient to be included in the SEP-1 Core Measure due to severe sepsis or septic shock? No   Exclusion criteria - the patient is NOT to be included for SEP-1 Core Measure due to:  Viral etiology found or highly suspected (including COVID-19) without concomitant bacterial infection          CONSULTS:  None    PROCEDURES:  Procedures    CRITICAL CARE TIME    Total Critical Care time was 0 minutes, excluding separately reportable procedures. There was a high probability of clinically significant/life threatening deterioration in the patient's condition which required my urgent intervention. FINAL IMPRESSION      1. Vaginal bleeding in pregnancy          DISPOSITION/PLAN   DISPOSITION        PATIENT REFERRED TO:  No follow-up provider specified. DISCHARGE MEDICATIONS:  New Prescriptions    No medications on file       Comment: Please note this report has been produced using speech recognition software and may contain errorsrelated to that system including errors in grammar, punctuation, and spelling, as well as words and phrases that may be inappropriate. If there are any questions or concerns please feel free to contact the dictating providerfor clarification.     Rivas Mg DO  Attending Emergency Physician               Rivas Mg DO  10/29/22 5407

## 2022-10-29 NOTE — ED TRIAGE NOTES
Pt states that she saw [facility] the day after she was told here that she was having a miscarriage and they advised her that she was not. She continues to have heavy vaginal bleeding. She also c/o fatigue, nausea, headache.

## 2022-10-30 NOTE — ED PROVIDER NOTES
8:50 PM EDT  Dariel Murrieta was checked out to me by Dr. José Miguel Pandey. Please see his/her initial documentation for details of the patient's ED presentation, physical exam and completed studies. In brief, Dariel Murrieta is a 24 y.o. female that presents to ED with continued vaginal bleeding. Pt was told 10/25 that she was probably having a miscarriage. Her B HCG level was only 62. Repeat was 68 the next day and saw primary care and they told her she wasn't miscarrying. Her levels should double every other day and the plateau of the level was already concerning as well as vaginal bleeding. Pt with 2 miscarriages in the past. Armen Zhu probably A3 now. She is pending repeat B HCG testing     I have reviewed and interpreted all of the currently available lab results from this visit (if applicable):  Results for orders placed or performed during the hospital encounter of 10/29/22   Strep Screen Group A Throat    Specimen: Throat   Result Value Ref Range    Rapid Strep A Screen Negative Negative   COVID-19, Rapid    Specimen: Nasopharyngeal Swab   Result Value Ref Range    SARS-CoV-2, NAAT Not Detected Not Detected   Rapid influenza A/B antigens    Specimen: Nares   Result Value Ref Range    Rapid Influenza A Ag Negative Negative    Rapid Influenza B Ag Negative Negative   HCG, Quantitative, Pregnancy   Result Value Ref Range    hCG Quant 10.3 <5.0 mIU/mL       MDM:  Her B HCG is down to 10, with vaginal bleeding, she is having miscarriage at this time. She has an appt to see OB/GYN on Tues of this coming week. Discussed to keep appt and they may repeat level and possible D&C could need to be done. Will d/c patient at this time. Final Impression:  1. Vaginal bleeding in pregnancy New Problem   2. Concern about miscarriage without diagnosis New Problem     Pt to follow up with OB/GYN on Tues at upcoming appt.       (Please note that portions of this note may have been completed with a voice recognition program. Efforts were made to edit the dictations but occasionally words are mis-transcribed.)    DO Jan De DO  10/29/22 2057

## 2022-11-29 RX ORDER — BISOPROLOL FUMARATE 5 MG/1
2.5 TABLET, FILM COATED ORAL DAILY
Qty: 30 TABLET | Refills: 5 | Status: SHIPPED | OUTPATIENT
Start: 2022-11-29

## 2023-01-07 ENCOUNTER — OFFICE VISIT (OUTPATIENT)
Dept: PRIMARY CARE CLINIC | Age: 28
End: 2023-01-07

## 2023-01-07 VITALS
OXYGEN SATURATION: 98 % | HEART RATE: 85 BPM | WEIGHT: 165 LBS | RESPIRATION RATE: 12 BRPM | BODY MASS INDEX: 25.84 KG/M2 | TEMPERATURE: 98.6 F

## 2023-01-07 DIAGNOSIS — J01.00 ACUTE NON-RECURRENT MAXILLARY SINUSITIS: Primary | ICD-10-CM

## 2023-01-07 DIAGNOSIS — R05.9 COUGH IN ADULT PATIENT: ICD-10-CM

## 2023-01-07 DIAGNOSIS — R11.0 NAUSEA: ICD-10-CM

## 2023-01-07 DIAGNOSIS — R53.83 OTHER FATIGUE: ICD-10-CM

## 2023-01-07 LAB
CONTROL: NORMAL
INFLUENZA A ANTIBODY: NORMAL
INFLUENZA B ANTIBODY: NORMAL
PREGNANCY TEST URINE, POC: NORMAL

## 2023-01-07 RX ORDER — BROMPHENIRAMINE MALEATE, PSEUDOEPHEDRINE HYDROCHLORIDE, AND DEXTROMETHORPHAN HYDROBROMIDE 2; 30; 10 MG/5ML; MG/5ML; MG/5ML
5 SYRUP ORAL 4 TIMES DAILY PRN
Qty: 118 ML | Refills: 0 | Status: SHIPPED | OUTPATIENT
Start: 2023-01-07 | End: 2023-01-17

## 2023-01-07 RX ORDER — AZITHROMYCIN 250 MG/1
250 TABLET, FILM COATED ORAL SEE ADMIN INSTRUCTIONS
Qty: 6 TABLET | Refills: 0 | Status: SHIPPED | OUTPATIENT
Start: 2023-01-07 | End: 2023-01-12

## 2023-01-07 ASSESSMENT — ENCOUNTER SYMPTOMS
SINUS PRESSURE: 1
SINUS PAIN: 1
NAUSEA: 1
VOMITING: 1
COUGH: 1
SORE THROAT: 1
RHINORRHEA: 1

## 2023-01-07 NOTE — PROGRESS NOTES
Leydi Romo 24 y.o. presents today for   Chief Complaint   Patient presents with    Cough    Fever    Congestion        HPI:  Leydi Romo states she has been sick for 5 days. She has a headache, nosebleeds, bilateral ear pain, coughing with increased pressure in her head. She was vomiting but that has stopped but nausea has persisted. She has been running a low grade fever. She has been taking phenergan but that is all. She has been sleeping constantly for four days. She says she is on birth control but has gotten pregnant on birth control before. She only remembers having a period 2-3 months ago. History reviewed. No pertinent family history.      Social History     Socioeconomic History    Marital status:      Spouse name: Not on file    Number of children: Not on file    Years of education: Not on file    Highest education level: Not on file   Occupational History    Not on file   Tobacco Use    Smoking status: Never    Smokeless tobacco: Never   Vaping Use    Vaping Use: Former    Substances: Nicotine   Substance and Sexual Activity    Alcohol use: No     Comment: rare    Drug use: No    Sexual activity: Yes   Other Topics Concern    Not on file   Social History Narrative    Not on file     Social Determinants of Health     Financial Resource Strain: Not on file   Food Insecurity: Not on file   Transportation Needs: Not on file   Physical Activity: Not on file   Stress: Not on file   Social Connections: Not on file   Intimate Partner Violence: Not on file   Housing Stability: Not on file        Past Surgical History:   Procedure Laterality Date    ABDOMEN SURGERY      cleaned out drainage from an ovarian cyst rupture     SECTION      DENTAL SURGERY          Past Medical History:   Diagnosis Date    Anxiety     Depression     Fibromyalgia     Migraine     Miscarriage     Ovarian cyst 2017    ruptured    POTS (postural orthostatic tachycardia syndrome)     Strep pharyngitis         Current Outpatient Medications   Medication Sig Dispense Refill    azithromycin (ZITHROMAX) 250 MG tablet Take 1 tablet by mouth See Admin Instructions for 5 days 500mg on day 1 followed by 250mg on days 2 - 5 6 tablet 0    brompheniramine-pseudoephedrine-DM 2-30-10 MG/5ML syrup Take 5 mLs by mouth 4 times daily as needed for Congestion or Cough 118 mL 0     No current facility-administered medications for this visit. Review of Systems   Constitutional:  Positive for fatigue. HENT:  Positive for congestion, rhinorrhea, sinus pressure, sinus pain and sore throat. Respiratory:  Positive for cough. Gastrointestinal:  Positive for nausea and vomiting. All other systems reviewed and are negative. Pulse 85   Temp 98.6 °F (37 °C) (Temporal)   Resp 12   Wt 165 lb (74.8 kg)   LMP 09/13/2022 (Exact Date)   SpO2 98%   BMI 25.84 kg/m²      Physical Exam  Vitals and nursing note reviewed. Constitutional:       Appearance: Normal appearance. She is ill-appearing. HENT:      Head: Normocephalic and atraumatic. Right Ear: Tympanic membrane, ear canal and external ear normal.      Left Ear: Ear canal and external ear normal.      Ears:      Comments: Bilateral effusions      Nose: Congestion and rhinorrhea present. Mouth/Throat:      Mouth: Mucous membranes are moist.      Pharynx: Oropharynx is clear. Posterior oropharyngeal erythema present. Eyes:      Extraocular Movements: Extraocular movements intact. Conjunctiva/sclera: Conjunctivae normal.      Pupils: Pupils are equal, round, and reactive to light. Cardiovascular:      Rate and Rhythm: Normal rate and regular rhythm. Pulses: Normal pulses. Heart sounds: Normal heart sounds. Pulmonary:      Effort: Pulmonary effort is normal.      Breath sounds: Normal breath sounds. Abdominal:      General: Bowel sounds are normal.      Palpations: Abdomen is soft.    Musculoskeletal:         General: Normal range of motion. Cervical back: Normal range of motion and neck supple. Skin:     General: Skin is warm and dry. Capillary Refill: Capillary refill takes less than 2 seconds. Neurological:      General: No focal deficit present. Mental Status: She is alert and oriented to person, place, and time. Psychiatric:         Mood and Affect: Mood normal.         Behavior: Behavior normal.        ASSESSMENT/PLAN    1. Acute non-recurrent maxillary sinusitis  Advised patient to take medication as directed. She should remain hydrated by drinking plenty of fluids and can take Tylenol alternating with ibuprofen for pain or fever. If her symptoms persist she should follow-up with her PCP. She is agreeable to plan of care. - azithromycin (ZITHROMAX) 250 MG tablet; Take 1 tablet by mouth See Admin Instructions for 5 days 500mg on day 1 followed by 250mg on days 2 - 5  Dispense: 6 tablet; Refill: 0    2. Cough in adult patient  Flu negative  Advised patient to take medication as directed. She should drink plenty of water. Follow-up with PCP if symptoms persist.  She is agreeable to plan of care. - POCT Influenza A/B  - brompheniramine-pseudoephedrine-DM 2-30-10 MG/5ML syrup; Take 5 mLs by mouth 4 times daily as needed for Congestion or Cough  Dispense: 118 mL; Refill: 0    3. Nausea  Pregnancy negative. Advised patient nausea may be due to postnasal drainage. She should take her medication as directed, she is agreeable to plan of care. - POCT urine pregnancy    4.  Other fatigue  Pregnancy negative  - POCT urine pregnancy             JESSICA De Leon - CNP

## 2023-01-30 ENCOUNTER — HOSPITAL ENCOUNTER (EMERGENCY)
Facility: HOSPITAL | Age: 28
Discharge: HOME | End: 2023-01-31
Attending: FAMILY MEDICINE
Payer: MEDICAID

## 2023-01-30 DIAGNOSIS — T50.905A MEDICATION SIDE EFFECT, INITIAL ENCOUNTER: Primary | ICD-10-CM

## 2023-01-30 LAB
BASOPHILS ABSOLUTE: 0.1 K/UL (ref 0–0.1)
BASOPHILS RELATIVE PERCENT: 0.7 %
EOSINOPHILS ABSOLUTE: 0.1 K/UL (ref 0–0.4)
EOSINOPHILS RELATIVE PERCENT: 1.2 %
HCT VFR BLD CALC: 39.5 % (ref 37–47)
HEMOGLOBIN: 13.4 G/DL (ref 11.5–16.5)
IMMATURE GRANULOCYTES #: 0 K/UL
IMMATURE GRANULOCYTES %: 0.3 % (ref 0–5)
LYMPHOCYTES ABSOLUTE: 2.9 K/UL (ref 1.5–4)
LYMPHOCYTES RELATIVE PERCENT: 25.1 %
MCH RBC QN AUTO: 28.5 PG (ref 27–32)
MCHC RBC AUTO-ENTMCNC: 33.9 G/DL (ref 31–35)
MCV RBC AUTO: 83.9 FL (ref 80–100)
MONOCYTES ABSOLUTE: 0.7 K/UL (ref 0.2–0.8)
MONOCYTES RELATIVE PERCENT: 6 %
NEUTROPHILS ABSOLUTE: 7.6 K/UL (ref 2–7.5)
NEUTROPHILS RELATIVE PERCENT: 66.7 %
PDW BLD-RTO: 11.9 % (ref 11–16)
PLATELET # BLD: 252 K/UL (ref 150–400)
PMV BLD AUTO: 10.7 FL (ref 6–10)
RAPID INFLUENZA  B AGN: NEGATIVE
RAPID INFLUENZA A AGN: NEGATIVE
RBC # BLD: 4.71 M/UL (ref 3.8–5.8)
SARS-COV-2, NAAT: NOT DETECTED
WBC # BLD: 11.5 K/UL (ref 4–11)

## 2023-01-30 PROCEDURE — 83735 ASSAY OF MAGNESIUM: CPT

## 2023-01-30 PROCEDURE — 87804 INFLUENZA ASSAY W/OPTIC: CPT

## 2023-01-30 PROCEDURE — 36415 COLL VENOUS BLD VENIPUNCTURE: CPT

## 2023-01-30 PROCEDURE — 87635 SARS-COV-2 COVID-19 AMP PRB: CPT

## 2023-01-30 PROCEDURE — 85025 COMPLETE CBC W/AUTO DIFF WBC: CPT

## 2023-01-30 PROCEDURE — 99283 EMERGENCY DEPT VISIT LOW MDM: CPT

## 2023-01-30 PROCEDURE — 80053 COMPREHEN METABOLIC PANEL: CPT

## 2023-01-30 RX ORDER — LAMOTRIGINE 100 MG/1
200 TABLET ORAL DAILY
COMMUNITY
End: 2023-01-31 | Stop reason: SDUPTHER

## 2023-01-30 RX ORDER — BUSPIRONE HYDROCHLORIDE 15 MG/1
15 TABLET ORAL 3 TIMES DAILY
COMMUNITY

## 2023-01-30 RX ORDER — TRAZODONE HYDROCHLORIDE 50 MG/1
100 TABLET ORAL NIGHTLY
COMMUNITY

## 2023-01-30 RX ORDER — PROPRANOLOL HYDROCHLORIDE 10 MG/1
10 TABLET ORAL 2 TIMES DAILY
COMMUNITY
Start: 2023-01-25

## 2023-01-31 VITALS
HEART RATE: 78 BPM | SYSTOLIC BLOOD PRESSURE: 110 MMHG | OXYGEN SATURATION: 98 % | BODY MASS INDEX: 25.27 KG/M2 | RESPIRATION RATE: 16 BRPM | DIASTOLIC BLOOD PRESSURE: 65 MMHG | WEIGHT: 161 LBS | TEMPERATURE: 98.1 F | HEIGHT: 67 IN

## 2023-01-31 LAB
A/G RATIO: 1.4 (ref 0.8–2)
ALBUMIN SERPL-MCNC: 3.8 G/DL (ref 3.4–4.8)
ALP BLD-CCNC: 70 U/L (ref 25–100)
ALT SERPL-CCNC: <5 U/L (ref 4–36)
ANION GAP SERPL CALCULATED.3IONS-SCNC: 11 MMOL/L (ref 3–16)
AST SERPL-CCNC: 9 U/L (ref 8–33)
BILIRUB SERPL-MCNC: 0.3 MG/DL (ref 0.3–1.2)
BUN BLDV-MCNC: 7 MG/DL (ref 6–20)
CALCIUM SERPL-MCNC: 8.8 MG/DL (ref 8.5–10.5)
CHLORIDE BLD-SCNC: 104 MMOL/L (ref 98–107)
CO2: 23 MMOL/L (ref 20–30)
CREAT SERPL-MCNC: 0.8 MG/DL (ref 0.4–1.2)
GFR SERPL CREATININE-BSD FRML MDRD: >60 ML/MIN/{1.73_M2}
GLOBULIN: 2.8 G/DL
GLUCOSE BLD-MCNC: 97 MG/DL (ref 74–106)
MAGNESIUM: 1.9 MG/DL (ref 1.7–2.4)
POTASSIUM REFLEX MAGNESIUM: 3.4 MMOL/L (ref 3.4–5.1)
SODIUM BLD-SCNC: 138 MMOL/L (ref 136–145)
TOTAL PROTEIN: 6.6 G/DL (ref 6.4–8.3)

## 2023-01-31 RX ORDER — LAMOTRIGINE 100 MG/1
100 TABLET ORAL DAILY
Qty: 30 TABLET | Refills: 0 | Status: SHIPPED | OUTPATIENT
Start: 2023-01-31

## 2023-01-31 ASSESSMENT — ENCOUNTER SYMPTOMS: NAUSEA: 1

## 2023-01-31 NOTE — ED PROVIDER NOTES
[address] [place] [address] Court  eMERGENCY dEPARTMENT eNCOUnter      Pt Name: Denise Escalante  MRN: [id number]  [place] 1995  Date of evaluation: 1/30/2023  Provider: Marnie Iraheta, [address] [place] [address]       Chief Complaint   Patient presents with    Medication Reaction   Possible medication side effect; HA, nausea, body aches      HISTORY OF PRESENT ILLNESS   (Location/Symptom, Timing/Onset, Context/Setting, Quality, Duration, Modifying Factors, Severity)  Note limiting factors. Denise Escalante is a 24 y.o. female who presents to the emergency department for having 5 day history of significant exhaustion, fatigue. Pt states that about 4 days, she has basically slept all day. Started having nausea today, history of migraines but patient states this is different and has had it since increasing her medications. Been having body aches that started today. Started having diarrhea today about 7 times. Her children are sick but mainly upper respiratory infections. Pt recently started on Propranolol/Inderal for history of POTS but placed on it for anxiety. History of borderline personality disorder, anxiety disorder, PTSD. Pt had been on Lamictal on and off for 3 years. She started taking 100 mg Lamictal 2 months ago. Decided to increase the medication to help with mood stabilization/psych issues. Doctor told her to monitor symptoms as it could cause \"some kind of reaction\"; No skin rash. No vomiting. Diarrhea. No significant mood changes. No suicidal/homicidal thoughts. Nursing Notes were reviewed. REVIEW OF SYSTEMS    (2-9 systems for level 4, 10 or more forlevel 5)     Review of Systems   Constitutional:  Positive for fatigue. Gastrointestinal:  Positive for nausea. Musculoskeletal:  Positive for arthralgias. Neurological:  Positive for headaches. All other systems reviewed and are negative.         PAST MEDICAL HISTORY     Past Medical History:   Diagnosis Date    Anxiety Depression     Fibromyalgia     Migraine     Miscarriage     Ovarian cyst 2017    ruptured    POTS (postural orthostatic tachycardia syndrome)     Strep pharyngitis          SURGICAL HISTORY       Past Surgical History:   Procedure Laterality Date    ABDOMEN SURGERY      cleaned out drainage from an ovarian cyst rupture     SECTION      DENTAL SURGERY           CURRENT MEDICATIONS       Current Discharge Medication List        CONTINUE these medications which have NOT CHANGED    Details   propranolol (INDERAL) 10 MG tablet Take 10 mg by mouth 2 times daily      busPIRone (BUSPAR) 15 MG tablet Take 15 mg by mouth 3 times daily      traZODone (DESYREL) 50 MG tablet Take 100 mg by mouth nightly             ALLERGIES     Zoloft [sertraline]    FAMILY HISTORY     History reviewed. No pertinent family history. SOCIAL HISTORY       Social History     Socioeconomic History    Marital status:      Spouse name: None    Number of children: None    Years of education: None    Highest education level: None   Tobacco Use    Smoking status: Never    Smokeless tobacco: Never   Vaping Use    Vaping Use: Former    Substances: Nicotine   Substance and Sexual Activity    Alcohol use: No     Comment: rare    Drug use: No    Sexual activity: Yes       SCREENINGS    Juan Daniel Coma Scale  Eye Opening: Spontaneous  Best Verbal Response: Oriented  Best Motor Response: Obeys commands  Juan Daniel Coma Scale Score: 15        PHYSICAL EXAM    (up to 7 for level 4, 8 or more for level 5)     ED Triage Vitals [23 2231]   BP Temp Temp Source Heart Rate Resp SpO2 Height Weight   117/84 98.1 °F (36.7 °C) Oral 78 16 98 % 5' 7\" (1.702 m) 161 lb (73 kg)       Physical Exam  Vitals and nursing note reviewed. Constitutional:       General: She is not in acute distress. Appearance: Normal appearance. HENT:      Head: Normocephalic.       Mouth/Throat:      Mouth: Mucous membranes are moist.      Pharynx: Oropharynx is clear.   Eyes:      Extraocular Movements: Extraocular movements intact. Pupils: Pupils are equal, round, and reactive to light. Cardiovascular:      Rate and Rhythm: Normal rate and regular rhythm. Heart sounds: Normal heart sounds. Pulmonary:      Effort: Pulmonary effort is normal.      Breath sounds: Normal breath sounds. Musculoskeletal:         General: Normal range of motion. Cervical back: Neck supple. Skin:     General: Skin is warm and dry. Neurological:      Mental Status: She is alert and oriented to person, place, and time. Psychiatric:         Mood and Affect: Mood is depressed. Behavior: Behavior normal.       DIAGNOSTIC RESULTS     EKG: All EKG's are interpreted by the Emergency Department Physician who either signs or Co-signs this chart in the absence of a cardiologist.    None    RADIOLOGY:   Non-plain film images such as CT, Ultrasound and MRI are read by the radiologist. Plain radiographic images are visualized andpreliminarily interpreted by the emergency physician with the below findings:    None    Interpretationper the Radiologist below, if available at the time of this note:    No orders to display         ED BEDSIDE ULTRASOUND:   Performed by ED Physician - none    LABS:  Labs Reviewed   CBC WITH AUTO DIFFERENTIAL - Abnormal; Notable for the following components:       Result Value    WBC 11.5 (*)     MPV 10.7 (*)     Neutrophils Absolute 7.6 (*)     All other components within normal limits   COVID-19, RAPID   RAPID INFLUENZA A/B ANTIGENS   COMPREHENSIVE METABOLIC PANEL W/ REFLEX TO MG FOR LOW K   MAGNESIUM   URINALYSIS WITH REFLEX TO CULTURE   PREGNANCY, URINE       All other labs were within normal range or not returned as of this dictation.     EMERGENCY DEPARTMENT COURSE and DIFFERENTIAL DIAGNOSIS/MDM:   :    Vitals:    01/30/23 2231 01/30/23 2245   BP: 117/84 113/71   Pulse: 78    Resp: 16    Temp: 98.1 °F (36.7 °C)    TempSrc: Oral    SpO2: 98% 100% Weight: 161 lb (73 kg)    Height: 5' 7\" (1.702 m)            CRITICAL CARE TIME   Total Critical Care time was 0 minutes, excluding separatelyreportable procedures. There was a high probability ofclinically significant/life threatening deterioration in the patient's condition which required my urgent intervention. CONSULTS:  None    PROCEDURES:  None    PROGRESS NOTES:    Pt here for nonspecific symptoms of headache, nausea, body aches, diarrhea. Pt without any acute viral symptoms otherwise. She had been placed on Propanolol but no bradycardia or hypotension noted. She had Lamictal increased to 200 mg on the same day. Discussed that it seems like her symptoms are more Lamictal related. Could have fatigue from Propranolol. Labs ordered to assess for any abnormality. No liver enzyme changes. No electrolyte changes, normal BP on couple checks and normal HR; No hematology changes with near normal WBC, Hgb and platelets. Will d/c home and told to decrease the Lamictal back to 100 mg since been on it for 2 months and in past and call Psych tomorrow for further assessment/guidance    Is this patient to be included in the SEP-1 Core Measure due to severe sepsis or septic shock? No   Exclusion criteria - the patient is NOT to be included for SEP-1 Core Measure due to: Infection is not suspected     FINAL IMPRESSION      1. Medication side effect, initial encounter New Problem         DISPOSITION/PLAN   DISPOSITION Decision To Discharge 01/31/2023 12:28:37 AM      PATIENT REFERRED TO:    Pt to reduce the Lamictal back to 100 mg daily. Call Psych tomorrow to discuss ER visit and to discuss about symptoms and whether to continue Propranolol. Continue to monitor fatigue, blood pressure and heart rate.         DISCHARGE MEDICATIONS:  Current Discharge Medication List          (Please note that portions of this note were completed with a voice recognition program.  Efforts were made to edit the dictations but occasionallywords are mis-transcribed.)    Favio Roldan DO (electronically signed)  Attending Emergency Physician          Favio Roldan DO  01/31/23 0030

## 2023-01-31 NOTE — ED TRIAGE NOTES
Patient c/o generalized body aches, headache, nausea since last Wednesday 1/25, but became much worse this evening. Patient reports she was started on propranolol on Wednesday 1/25 & had her Lamictal increased from 100 mg/day to 200 mg/day today. States she was told to come to ER if she developed flu like symptoms as it could be a reaction to the medication changes.

## 2023-02-16 ENCOUNTER — TRANSCRIBE ORDERS (OUTPATIENT)
Dept: MAMMOGRAPHY | Facility: HOSPITAL | Age: 28
End: 2023-02-16
Payer: MEDICAID

## 2023-02-16 DIAGNOSIS — R92.8 ABNORMAL MAMMOGRAM: Primary | ICD-10-CM

## 2023-02-21 ENCOUNTER — HOSPITAL ENCOUNTER (OUTPATIENT)
Dept: ULTRASOUND IMAGING | Facility: HOSPITAL | Age: 28
Discharge: HOME | End: 2023-02-21
Payer: MEDICAID

## 2023-02-21 DIAGNOSIS — N63.10 MASS OF RIGHT BREAST, UNSPECIFIED QUADRANT: ICD-10-CM

## 2023-02-21 PROCEDURE — 76642 ULTRASOUND BREAST LIMITED: CPT

## 2023-02-28 ENCOUNTER — HOSPITAL ENCOUNTER (OUTPATIENT)
Facility: HOSPITAL | Age: 28
Discharge: HOME | End: 2023-02-28
Payer: MEDICAID

## 2023-02-28 ENCOUNTER — HOSPITAL ENCOUNTER (OUTPATIENT)
Dept: CT IMAGING | Facility: HOSPITAL | Age: 28
Discharge: HOME | End: 2023-02-28
Payer: MEDICAID

## 2023-02-28 DIAGNOSIS — R31.9 URINARY TRACT INFECTION WITH HEMATURIA, SITE UNSPECIFIED: ICD-10-CM

## 2023-02-28 DIAGNOSIS — R31.9 HEMATURIA, UNSPECIFIED TYPE: ICD-10-CM

## 2023-02-28 DIAGNOSIS — R10.30 LOWER ABDOMINAL PAIN: ICD-10-CM

## 2023-02-28 DIAGNOSIS — N39.0 URINARY TRACT INFECTION WITH HEMATURIA, SITE UNSPECIFIED: ICD-10-CM

## 2023-02-28 DIAGNOSIS — M54.50 LOW BACK PAIN, UNSPECIFIED BACK PAIN LATERALITY, UNSPECIFIED CHRONICITY, UNSPECIFIED WHETHER SCIATICA PRESENT: ICD-10-CM

## 2023-02-28 PROCEDURE — 87077 CULTURE AEROBIC IDENTIFY: CPT

## 2023-02-28 PROCEDURE — 87086 URINE CULTURE/COLONY COUNT: CPT

## 2023-02-28 PROCEDURE — 74176 CT ABD & PELVIS W/O CONTRAST: CPT

## 2023-03-02 LAB — URINE CULTURE, ROUTINE: NORMAL

## 2023-03-14 ENCOUNTER — HOSPITAL ENCOUNTER (OUTPATIENT)
Dept: ULTRASOUND IMAGING | Facility: HOSPITAL | Age: 28
Discharge: HOME | End: 2023-03-14
Admitting: SURGERY
Payer: MEDICAID

## 2023-03-14 DIAGNOSIS — R92.8 ABNORMAL MAMMOGRAM: ICD-10-CM

## 2023-03-14 PROCEDURE — 76642 ULTRASOUND BREAST LIMITED: CPT

## 2023-03-14 PROCEDURE — 76642 ULTRASOUND BREAST LIMITED: CPT | Performed by: RADIOLOGY

## 2023-05-22 ENCOUNTER — HOSPITAL ENCOUNTER (EMERGENCY)
Facility: HOSPITAL | Age: 28
Discharge: HOME | End: 2023-05-22
Attending: FAMILY MEDICINE
Payer: MEDICAID

## 2023-05-22 VITALS
OXYGEN SATURATION: 100 % | DIASTOLIC BLOOD PRESSURE: 72 MMHG | SYSTOLIC BLOOD PRESSURE: 109 MMHG | TEMPERATURE: 98.5 F | RESPIRATION RATE: 16 BRPM | WEIGHT: 161 LBS | BODY MASS INDEX: 25.22 KG/M2 | HEART RATE: 79 BPM

## 2023-05-22 DIAGNOSIS — M54.50 THORACOLUMBAR BACK PAIN: Primary | ICD-10-CM

## 2023-05-22 DIAGNOSIS — M54.6 THORACOLUMBAR BACK PAIN: Primary | ICD-10-CM

## 2023-05-22 DIAGNOSIS — M54.31 BILATERAL SCIATICA: ICD-10-CM

## 2023-05-22 DIAGNOSIS — M54.32 BILATERAL SCIATICA: ICD-10-CM

## 2023-05-22 PROCEDURE — 99283 EMERGENCY DEPT VISIT LOW MDM: CPT

## 2023-05-22 PROCEDURE — 6370000000 HC RX 637 (ALT 250 FOR IP): Performed by: FAMILY MEDICINE

## 2023-05-22 RX ORDER — PREDNISONE 20 MG/1
40 TABLET ORAL ONCE
Status: COMPLETED | OUTPATIENT
Start: 2023-05-22 | End: 2023-05-22

## 2023-05-22 RX ORDER — GABAPENTIN 300 MG/1
300 CAPSULE ORAL EVERY 8 HOURS PRN
Qty: 9 CAPSULE | Refills: 0 | Status: SHIPPED | OUTPATIENT
Start: 2023-05-22 | End: 2023-05-25

## 2023-05-22 RX ORDER — NAPROXEN 500 MG/1
500 TABLET ORAL ONCE
Status: COMPLETED | OUTPATIENT
Start: 2023-05-22 | End: 2023-05-22

## 2023-05-22 RX ADMIN — PREDNISONE 40 MG: 20 TABLET ORAL at 20:58

## 2023-05-22 RX ADMIN — NAPROXEN 500 MG: 500 TABLET ORAL at 20:58

## 2023-05-22 ASSESSMENT — PAIN DESCRIPTION - LOCATION: LOCATION: BACK

## 2023-05-22 ASSESSMENT — LIFESTYLE VARIABLES
HOW MANY STANDARD DRINKS CONTAINING ALCOHOL DO YOU HAVE ON A TYPICAL DAY: PATIENT DOES NOT DRINK
HOW OFTEN DO YOU HAVE A DRINK CONTAINING ALCOHOL: NEVER

## 2023-05-22 ASSESSMENT — PAIN DESCRIPTION - DESCRIPTORS: DESCRIPTORS: DISCOMFORT;SHOOTING

## 2023-05-22 ASSESSMENT — PAIN SCALES - GENERAL
PAINLEVEL_OUTOF10: 7
PAINLEVEL_OUTOF10: 6

## 2023-05-22 ASSESSMENT — ENCOUNTER SYMPTOMS: BACK PAIN: 1

## 2023-05-22 ASSESSMENT — PAIN DESCRIPTION - PAIN TYPE: TYPE: ACUTE PAIN

## 2023-05-22 ASSESSMENT — PAIN - FUNCTIONAL ASSESSMENT: PAIN_FUNCTIONAL_ASSESSMENT: 0-10

## 2023-05-22 ASSESSMENT — PAIN DESCRIPTION - ORIENTATION: ORIENTATION: MID;LOWER

## 2023-05-22 NOTE — ED NOTES
Pt arrives to ED POV with complaints of mid back pain radiating to mid-lower back.       Tray Harvey RN  05/22/23 [id number]

## 2023-05-23 NOTE — ED PROVIDER NOTES
[address] [facility] ENCOUNTER        Pt Name: Alverda Cranker  MRN: [id number]  [place] 1995  Date of evaluation: 2023  Provider: Alice Eagle DO  PCP: JESSICA Agrawal CNP  Note Started: 8:13 PM EDT 23    CHIEF COMPLAINT       Chief Complaint   Patient presents with    Back Pain     Mid back radiating to mid lower    Rib Pain     Left, no injury       HISTORY OF PRESENT ILLNESS: 1 or more Elements     History from : Patient    Limitations to history : None    Alverda Cranker is a 34 y.o. female who presents to ED for having back and rib pain. Pt states that her back pain started on Friday. She points to the lower thoracic central area with pain going to lower back and then down her legs. She started having pain to the left side of her ribs on Saturday evening. She said that her back always hurts but different pain. No lifting, straining of her back. She has a cough and it causes her back to hurt worse. History of sciatic problems in the past. Pain 6/10, patient has been taking Motrin and Tylenol around the clock without any help. Tried to call primary care but they told her to go to the ER cause they may have things there that we don't have. Pt wasn't sure what they meant. No abdominal pain. No weakness to legs. No perineal numbness, no incontinence of stool/urine. No herniated disc history. Nursing Notes were all reviewed and agreed with or any disagreements were addressed in the HPI. REVIEW OF SYSTEMS :      Review of Systems   Musculoskeletal:  Positive for arthralgias, back pain and myalgias. Pain down her legs, more right vs left   All other systems reviewed and are negative.         SURGICAL HISTORY     Past Surgical History:   Procedure Laterality Date    ABDOMEN SURGERY      cleaned out drainage from an ovarian cyst rupture    BREAST SURGERY Right     mass removed     SECTION      DENTAL SURGERY

## 2023-05-23 NOTE — ED NOTES
Pt informed of d/c instructions, Pt verbalizes understanding.         Jennifer Dawkins RN  05/22/23 5541

## 2023-07-26 ENCOUNTER — HOSPITAL ENCOUNTER (EMERGENCY)
Facility: HOSPITAL | Age: 28
Discharge: HOME | End: 2023-07-26
Attending: EMERGENCY MEDICINE
Payer: MEDICAID

## 2023-07-26 VITALS
HEIGHT: 67 IN | WEIGHT: 160 LBS | TEMPERATURE: 97.8 F | HEART RATE: 97 BPM | BODY MASS INDEX: 25.11 KG/M2 | OXYGEN SATURATION: 100 % | RESPIRATION RATE: 22 BRPM | SYSTOLIC BLOOD PRESSURE: 115 MMHG | DIASTOLIC BLOOD PRESSURE: 89 MMHG

## 2023-07-26 DIAGNOSIS — T78.40XA ALLERGIC REACTION, INITIAL ENCOUNTER: Primary | ICD-10-CM

## 2023-07-26 PROCEDURE — 99282 EMERGENCY DEPT VISIT SF MDM: CPT

## 2023-07-27 ASSESSMENT — ENCOUNTER SYMPTOMS
VOMITING: 0
CHEST TIGHTNESS: 0
NAUSEA: 0
EYE PAIN: 0
SINUS PRESSURE: 0
EYE REDNESS: 0
BACK PAIN: 0
SHORTNESS OF BREATH: 0
ABDOMINAL PAIN: 0
STRIDOR: 0
WHEEZING: 0
SORE THROAT: 0
RHINORRHEA: 0
DIARRHEA: 0
PHOTOPHOBIA: 0

## 2023-07-27 NOTE — ED TRIAGE NOTES
Patient states that she ate some pasta and feels as if her throat has started swelling. Patient states that she did this one other time and it made her eye swell. Patient did take benadryl one hour PTA.

## 2023-07-27 NOTE — DISCHARGE INSTRUCTIONS
Please continue all current medications as previously prescribed. Please continue taking Benadryl 25 mg 1 to 2 pills every 4-6 hours as needed for the allergic reaction. If any new/acute symptoms or worsening of current presentation please go to the closest urgent care or emergency room for prompt reevaluation.

## 2023-09-24 ENCOUNTER — APPOINTMENT (OUTPATIENT)
Dept: CT IMAGING | Facility: HOSPITAL | Age: 28
End: 2023-09-24
Payer: MEDICAID

## 2023-09-24 ENCOUNTER — HOSPITAL ENCOUNTER (EMERGENCY)
Facility: HOSPITAL | Age: 28
Discharge: HOME | End: 2023-09-24
Attending: EMERGENCY MEDICINE
Payer: MEDICAID

## 2023-09-24 VITALS
HEIGHT: 67 IN | SYSTOLIC BLOOD PRESSURE: 123 MMHG | HEART RATE: 78 BPM | BODY MASS INDEX: 25.9 KG/M2 | DIASTOLIC BLOOD PRESSURE: 77 MMHG | TEMPERATURE: 98.7 F | WEIGHT: 165 LBS | RESPIRATION RATE: 18 BRPM | OXYGEN SATURATION: 98 %

## 2023-09-24 DIAGNOSIS — S16.1XXA CERVICAL STRAIN, ACUTE, INITIAL ENCOUNTER: ICD-10-CM

## 2023-09-24 DIAGNOSIS — S30.0XXA LUMBAR CONTUSION, INITIAL ENCOUNTER: Primary | ICD-10-CM

## 2023-09-24 LAB — HCG UR QL: NEGATIVE

## 2023-09-24 PROCEDURE — 84703 CHORIONIC GONADOTROPIN ASSAY: CPT

## 2023-09-24 PROCEDURE — 72125 CT NECK SPINE W/O DYE: CPT

## 2023-09-24 PROCEDURE — 99284 EMERGENCY DEPT VISIT MOD MDM: CPT

## 2023-09-24 PROCEDURE — 72131 CT LUMBAR SPINE W/O DYE: CPT

## 2023-09-24 RX ORDER — ZOLPIDEM TARTRATE 5 MG/1
5 TABLET ORAL NIGHTLY PRN
COMMUNITY

## 2023-09-25 NOTE — ED NOTES
Pt left ED ambulatory with belongings at this time. Pt verbalized understanding of discharge instructions.        Matthew Freeman RN  09/24/23 7814

## 2023-09-25 NOTE — DISCHARGE INSTRUCTIONS
Please alternate Motrin with Tylenol as needed for pain control, continue all other prescribed medications, apply an ice pack to the affected area, follow-up with PCP if any further medical concerns. If any new/acute symptoms or worsening of current presentation please go to the closest surgical emergency room for prompt re-evaluation.

## 2023-09-26 ASSESSMENT — ENCOUNTER SYMPTOMS
CHEST TIGHTNESS: 0
DIARRHEA: 0
WHEEZING: 0
EYE REDNESS: 0
SINUS PRESSURE: 0
EYE PAIN: 0
VOMITING: 0
NAUSEA: 0
BACK PAIN: 1
SORE THROAT: 0
ABDOMINAL PAIN: 0
RHINORRHEA: 0
STRIDOR: 0
PHOTOPHOBIA: 0
SHORTNESS OF BREATH: 0

## 2023-09-26 NOTE — ED PROVIDER NOTES
[address]   [phone]    Schedule an appointment as soon as possible for a visit in 2 days  As needed, If symptoms worsen    [facility]  [address] [facility]   [address] [facility]  [phone]    If unable to see PCP timely      DISCHARGE MEDICATIONS:  Discharge Medication List as of 9/24/2023 10:30 PM          DISCONTINUED MEDICATIONS:  Discharge Medication List as of 9/24/2023 10:30 PM                 (Please note that portions of this note were completed with a voice recognition program.  Efforts were made to edit the dictations but occasionally words are mis-transcribed.)    Keli Rodrigez MD (electronically signed)            Keli Rodrigez MD  09/26/23 7436

## 2023-11-25 ENCOUNTER — HOSPITAL ENCOUNTER (EMERGENCY)
Facility: HOSPITAL | Age: 28
Discharge: HOME | End: 2023-11-25
Attending: EMERGENCY MEDICINE
Payer: MEDICAID

## 2023-11-25 VITALS
RESPIRATION RATE: 17 BRPM | BODY MASS INDEX: 25.9 KG/M2 | DIASTOLIC BLOOD PRESSURE: 68 MMHG | HEART RATE: 79 BPM | OXYGEN SATURATION: 100 % | TEMPERATURE: 98.1 F | HEIGHT: 67 IN | WEIGHT: 165 LBS | SYSTOLIC BLOOD PRESSURE: 132 MMHG

## 2023-11-25 DIAGNOSIS — U07.1 COVID-19: Primary | ICD-10-CM

## 2023-11-25 PROCEDURE — 99283 EMERGENCY DEPT VISIT LOW MDM: CPT

## 2023-11-25 RX ORDER — ACETAMINOPHEN 500 MG
500 TABLET ORAL EVERY 6 HOURS PRN
Qty: 30 TABLET | Refills: 1 | Status: SHIPPED | OUTPATIENT
Start: 2023-11-25

## 2023-11-25 RX ORDER — IBUPROFEN 600 MG/1
600 TABLET ORAL EVERY 6 HOURS PRN
Qty: 30 TABLET | Refills: 0 | Status: SHIPPED | OUTPATIENT
Start: 2023-11-25

## 2023-11-25 ASSESSMENT — ENCOUNTER SYMPTOMS
BACK PAIN: 0
RHINORRHEA: 0
EYE PAIN: 0
COUGH: 1
NAUSEA: 0
DIARRHEA: 0
ABDOMINAL PAIN: 0
SHORTNESS OF BREATH: 0
CONSTIPATION: 0
EYE REDNESS: 0
VOMITING: 0

## 2023-11-25 ASSESSMENT — PAIN - FUNCTIONAL ASSESSMENT: PAIN_FUNCTIONAL_ASSESSMENT: NONE - DENIES PAIN

## 2023-11-26 NOTE — ED PROVIDER NOTES
[address] ENCOUNTER        Pt Name: Kaela Coffman  MRN: [id number]  [address] [employer] 1995  Date of evaluation: 11/25/2023  Provider: Angel Gautam DO  PCP: Joshua Sheth, APRN - CNP  Note Started: 9:20 PM EST 11/25/23    CHIEF COMPLAINT      Cough, Congestion, Body Aches  HISTORY OF PRESENT ILLNESS: 1 or more Elements     Chief Complaint   Patient presents with    Positive For Covid-19     History from : Patient  Limitations to history : None    Kaela Coffman is a 34 y.o. female who presents to the emergency department secondary to concern for cough, congestion body aches. Reports that she took a home COVID test which was positive. She was unsure what medication she should or should not take so she decided come get checked out. She does report a history of asthma and has been using albuterol at home which has been helping her breathing. Past medical history noted below. Aside from what is stated above denies any other symptoms or modifying factors. reports that she has never smoked. She has never used smokeless tobacco. She reports that she does not drink alcohol and does not use drugs. Nursing Notes were all reviewed and agreed with or any disagreements addressed in HPI/MDM. REVIEW OF SYSTEMS :    Review of Systems   Constitutional:  Positive for chills. Negative for fever. HENT:  Positive for congestion. Negative for rhinorrhea. Eyes:  Negative for pain and redness. Respiratory:  Positive for cough. Negative for shortness of breath. Cardiovascular:  Negative for chest pain and leg swelling. Gastrointestinal:  Negative for abdominal pain, constipation, diarrhea, nausea and vomiting. Genitourinary:  Negative for frequency and urgency. Musculoskeletal:  Positive for myalgias. Negative for back pain and neck pain. Skin:  Negative for rash. Neurological:  Negative for facial asymmetry and weakness.

## 2023-11-26 NOTE — ED NOTES
Pt left ED ambulatory with belongings at this time. Pt verbalized understanding of discharge instructions and prescriptions. Pt provided with work note.       Dontae Neville RN  11/25/23 0125 no

## 2023-12-10 ENCOUNTER — HOSPITAL ENCOUNTER (EMERGENCY)
Facility: HOSPITAL | Age: 28
Discharge: HOME | End: 2023-12-10
Attending: EMERGENCY MEDICINE
Payer: MEDICAID

## 2023-12-10 ENCOUNTER — APPOINTMENT (OUTPATIENT)
Dept: GENERAL RADIOLOGY | Facility: HOSPITAL | Age: 28
End: 2023-12-10
Attending: EMERGENCY MEDICINE
Payer: MEDICAID

## 2023-12-10 VITALS
SYSTOLIC BLOOD PRESSURE: 116 MMHG | TEMPERATURE: 98.4 F | WEIGHT: 165 LBS | DIASTOLIC BLOOD PRESSURE: 75 MMHG | HEART RATE: 92 BPM | OXYGEN SATURATION: 100 % | BODY MASS INDEX: 25.9 KG/M2 | HEIGHT: 67 IN | RESPIRATION RATE: 16 BRPM

## 2023-12-10 DIAGNOSIS — S93.401A SPRAIN OF RIGHT ANKLE, UNSPECIFIED LIGAMENT, INITIAL ENCOUNTER: Primary | ICD-10-CM

## 2023-12-10 PROCEDURE — 99283 EMERGENCY DEPT VISIT LOW MDM: CPT

## 2023-12-10 PROCEDURE — 73610 X-RAY EXAM OF ANKLE: CPT

## 2023-12-10 RX ORDER — ACETAMINOPHEN 500 MG
500 TABLET ORAL EVERY 6 HOURS PRN
Qty: 30 TABLET | Refills: 1 | Status: SHIPPED | OUTPATIENT
Start: 2023-12-10

## 2023-12-10 RX ORDER — IBUPROFEN 600 MG/1
600 TABLET ORAL EVERY 6 HOURS PRN
Qty: 30 TABLET | Refills: 0 | Status: SHIPPED | OUTPATIENT
Start: 2023-12-10

## 2023-12-10 ASSESSMENT — ENCOUNTER SYMPTOMS
ABDOMINAL PAIN: 0
NAUSEA: 0
SHORTNESS OF BREATH: 0
COUGH: 0
BACK PAIN: 0
EYE REDNESS: 0
EYE PAIN: 0
VOMITING: 0
CONSTIPATION: 0
RHINORRHEA: 0
DIARRHEA: 0

## 2023-12-10 ASSESSMENT — PAIN DESCRIPTION - ONSET: ONSET: SUDDEN

## 2023-12-10 ASSESSMENT — LIFESTYLE VARIABLES: HOW OFTEN DO YOU HAVE A DRINK CONTAINING ALCOHOL: MONTHLY OR LESS

## 2023-12-10 ASSESSMENT — PAIN DESCRIPTION - PAIN TYPE: TYPE: ACUTE PAIN

## 2023-12-10 ASSESSMENT — PAIN - FUNCTIONAL ASSESSMENT: PAIN_FUNCTIONAL_ASSESSMENT: 0-10

## 2023-12-10 ASSESSMENT — PAIN DESCRIPTION - FREQUENCY: FREQUENCY: CONTINUOUS

## 2023-12-10 ASSESSMENT — PAIN DESCRIPTION - DESCRIPTORS: DESCRIPTORS: SHOOTING

## 2023-12-10 ASSESSMENT — PAIN DESCRIPTION - LOCATION: LOCATION: ANKLE

## 2023-12-10 ASSESSMENT — PAIN SCALES - GENERAL: PAINLEVEL_OUTOF10: 3

## 2023-12-10 ASSESSMENT — PAIN DESCRIPTION - ORIENTATION: ORIENTATION: RIGHT

## 2023-12-10 NOTE — ED NOTES
Discharge instructions reviewed with pt and understanding verbalized, pt has no further needs or concerns at this time. Brace placed to right ankle and pt given crutches, pt ambulated out of ED without difficulty.         Dong Rawls RN  12/10/23 1038

## 2023-12-14 NOTE — CARE COORDINATION
Patient states ankle still painful. Went to PCP earlier today and was, again, told it is a bad ankle sprain. Patient stated she will seek more help if not better in 1 week. No questions at the time.

## 2023-12-24 ENCOUNTER — HOSPITAL ENCOUNTER (EMERGENCY)
Facility: HOSPITAL | Age: 28
Discharge: HOME | End: 2023-12-25
Attending: EMERGENCY MEDICINE
Payer: COMMERCIAL

## 2023-12-24 VITALS
OXYGEN SATURATION: 99 % | SYSTOLIC BLOOD PRESSURE: 120 MMHG | TEMPERATURE: 97.8 F | RESPIRATION RATE: 18 BRPM | DIASTOLIC BLOOD PRESSURE: 80 MMHG | WEIGHT: 167 LBS | HEIGHT: 67 IN | HEART RATE: 75 BPM | BODY MASS INDEX: 26.21 KG/M2

## 2023-12-24 DIAGNOSIS — B96.89 BACTERIAL VAGINOSIS: Primary | ICD-10-CM

## 2023-12-24 DIAGNOSIS — N76.0 BACTERIAL VAGINOSIS: Primary | ICD-10-CM

## 2023-12-24 LAB
ALBUMIN SERPL-MCNC: 4 G/DL (ref 3.4–4.8)
ALBUMIN/GLOB SERPL: 1.8 {RATIO} (ref 0.8–2)
ALP SERPL-CCNC: 74 U/L (ref 25–100)
ALT SERPL-CCNC: 6 U/L (ref 4–36)
ANION GAP SERPL CALCULATED.3IONS-SCNC: 8 MMOL/L (ref 3–16)
AST SERPL-CCNC: 12 U/L (ref 8–33)
BACTERIA GENITAL QL WET PREP: NORMAL
BASOPHILS # BLD: 0.1 K/UL (ref 0–0.1)
BASOPHILS NFR BLD: 0.8 %
BILIRUB SERPL-MCNC: 0.4 MG/DL (ref 0.3–1.2)
BILIRUB UR QL STRIP.AUTO: NEGATIVE
BUN SERPL-MCNC: 9 MG/DL (ref 6–20)
CALCIUM SERPL-MCNC: 8.5 MG/DL (ref 8.5–10.5)
CHLORIDE SERPL-SCNC: 102 MMOL/L (ref 98–107)
CLARITY UR: CLEAR
CLUE CELLS SPEC QL WET PREP: NORMAL
CO2 SERPL-SCNC: 27 MMOL/L (ref 20–30)
COLOR UR: YELLOW
CREAT SERPL-MCNC: 0.8 MG/DL (ref 0.4–1.2)
EOSINOPHIL # BLD: 0.2 K/UL (ref 0–0.4)
EOSINOPHIL NFR BLD: 1.7 %
EPI CELLS SPEC QL WET PREP: NORMAL
ERYTHROCYTE [DISTWIDTH] IN BLOOD BY AUTOMATED COUNT: 12 % (ref 11–16)
GFR SERPLBLD CREATININE-BSD FMLA CKD-EPI: >60 ML/MIN/{1.73_M2}
GLOBULIN SER CALC-MCNC: 2.2 G/DL
GLUCOSE SERPL-MCNC: 94 MG/DL (ref 74–106)
GLUCOSE UR STRIP.AUTO-MCNC: NEGATIVE MG/DL
HCG UR QL: NEGATIVE
HCT VFR BLD AUTO: 38.8 % (ref 37–47)
HGB BLD-MCNC: 12.9 G/DL (ref 11.5–16.5)
HGB UR QL STRIP.AUTO: NEGATIVE
IMM GRANULOCYTES # BLD: 0 K/UL
IMM GRANULOCYTES NFR BLD: 0.2 % (ref 0–5)
KETONES UR STRIP.AUTO-MCNC: NEGATIVE MG/DL
KOH PREP SPEC: NORMAL
LEUKOCYTE ESTERASE UR QL STRIP.AUTO: NEGATIVE
LIPASE SERPL-CCNC: 20 U/L (ref 5.6–51.3)
LYMPHOCYTES # BLD: 3.2 K/UL (ref 1.5–4)
LYMPHOCYTES NFR BLD: 37.2 %
MCH RBC QN AUTO: 28.7 PG (ref 27–32)
MCHC RBC AUTO-ENTMCNC: 33.2 G/DL (ref 31–35)
MCV RBC AUTO: 86.4 FL (ref 80–100)
MONOCYTES # BLD: 0.5 K/UL (ref 0.2–0.8)
MONOCYTES NFR BLD: 5.8 %
NEUTROPHILS # BLD: 4.7 K/UL (ref 2–7.5)
NEUTS SEG NFR BLD: 54.3 %
NITRITE UR QL STRIP.AUTO: NEGATIVE
PH UR STRIP.AUTO: 6 [PH] (ref 5–8)
PLATELET # BLD AUTO: 255 K/UL (ref 150–400)
PMV BLD AUTO: 10.7 FL (ref 6–10)
POTASSIUM SERPL-SCNC: 3.4 MMOL/L (ref 3.4–5.1)
PROT SERPL-MCNC: 6.2 G/DL (ref 6.4–8.3)
PROT UR STRIP.AUTO-MCNC: NEGATIVE MG/DL
RBC # BLD AUTO: 4.49 M/UL (ref 3.8–5.8)
RBC SPEC QL WET PREP: NORMAL
SODIUM SERPL-SCNC: 137 MMOL/L (ref 136–145)
SP GR UR STRIP.AUTO: 1.01 (ref 1–1.03)
SPECIMEN SOURCE FLD: NORMAL
T VAGINALIS GENITAL QL WET PREP: NORMAL
UA COMPLETE W REFLEX CULTURE PNL UR: NORMAL
UA DIPSTICK W REFLEX MICRO PNL UR: NORMAL
URN SPEC COLLECT METH UR: NORMAL
UROBILINOGEN UR STRIP-ACNC: 0.2 E.U./DL
WBC # BLD AUTO: 8.6 K/UL (ref 4–11)
WBC SPEC QL WET PREP: NORMAL
YEAST GENITAL QL WET PREP: NORMAL

## 2023-12-24 PROCEDURE — 96375 TX/PRO/DX INJ NEW DRUG ADDON: CPT

## 2023-12-24 PROCEDURE — 96374 THER/PROPH/DIAG INJ IV PUSH: CPT

## 2023-12-24 PROCEDURE — 2580000003 HC RX 258: Performed by: EMERGENCY MEDICINE

## 2023-12-24 PROCEDURE — 6360000002 HC RX W HCPCS: Performed by: EMERGENCY MEDICINE

## 2023-12-24 PROCEDURE — 83690 ASSAY OF LIPASE: CPT

## 2023-12-24 PROCEDURE — 84703 CHORIONIC GONADOTROPIN ASSAY: CPT

## 2023-12-24 PROCEDURE — 85025 COMPLETE CBC W/AUTO DIFF WBC: CPT

## 2023-12-24 PROCEDURE — 80053 COMPREHEN METABOLIC PANEL: CPT

## 2023-12-24 PROCEDURE — 81003 URINALYSIS AUTO W/O SCOPE: CPT

## 2023-12-24 PROCEDURE — 99284 EMERGENCY DEPT VISIT MOD MDM: CPT

## 2023-12-24 PROCEDURE — 36415 COLL VENOUS BLD VENIPUNCTURE: CPT

## 2023-12-24 PROCEDURE — 87210 SMEAR WET MOUNT SALINE/INK: CPT

## 2023-12-24 RX ORDER — SODIUM CHLORIDE, SODIUM LACTATE, POTASSIUM CHLORIDE, AND CALCIUM CHLORIDE .6; .31; .03; .02 G/100ML; G/100ML; G/100ML; G/100ML
1000 INJECTION, SOLUTION INTRAVENOUS ONCE
Status: COMPLETED | OUTPATIENT
Start: 2023-12-24 | End: 2023-12-24

## 2023-12-24 RX ORDER — KETOROLAC TROMETHAMINE 15 MG/ML
15 INJECTION, SOLUTION INTRAMUSCULAR; INTRAVENOUS ONCE
Status: COMPLETED | OUTPATIENT
Start: 2023-12-24 | End: 2023-12-24

## 2023-12-24 RX ORDER — ONDANSETRON 2 MG/ML
4 INJECTION INTRAMUSCULAR; INTRAVENOUS ONCE
Status: COMPLETED | OUTPATIENT
Start: 2023-12-24 | End: 2023-12-24

## 2023-12-24 RX ORDER — METRONIDAZOLE 500 MG/1
500 TABLET ORAL ONCE
Status: COMPLETED | OUTPATIENT
Start: 2023-12-25 | End: 2023-12-25

## 2023-12-24 RX ADMIN — ONDANSETRON 4 MG: 2 INJECTION INTRAMUSCULAR; INTRAVENOUS at 22:40

## 2023-12-24 RX ADMIN — KETOROLAC TROMETHAMINE 15 MG: 15 INJECTION, SOLUTION INTRAMUSCULAR; INTRAVENOUS at 22:40

## 2023-12-24 RX ADMIN — SODIUM CHLORIDE, POTASSIUM CHLORIDE, SODIUM LACTATE AND CALCIUM CHLORIDE 1000 ML: 600; 310; 30; 20 INJECTION, SOLUTION INTRAVENOUS at 22:39

## 2023-12-25 PROCEDURE — 6370000000 HC RX 637 (ALT 250 FOR IP): Performed by: EMERGENCY MEDICINE

## 2023-12-25 RX ORDER — METRONIDAZOLE 500 MG/1
500 TABLET ORAL 2 TIMES DAILY
Qty: 14 TABLET | Refills: 0 | Status: SHIPPED | OUTPATIENT
Start: 2023-12-25 | End: 2024-01-01

## 2023-12-25 RX ADMIN — METRONIDAZOLE 500 MG: 500 TABLET ORAL at 00:29

## 2023-12-25 NOTE — ED PROVIDER NOTES
Ameya Zamora [address] [facility] ENCOUNTER        Pt Name: Farrah Jeong  MRN: [id number]  [address] [facility] [place] 1995  Date of evaluation: 2023  Provider: Chelle Chairez MD  PCP: JESSICA Mansfield CNP  Note Started: 10:01 PM EST 23    CHIEF COMPLAINT       Chief Complaint   Patient presents with    Abdominal Pain       HISTORY OF PRESENT ILLNESS: 1 or more Elements     History from : Patient    Limitations to history : None    Farrah Jeong is a 34 y.o. female who presents waning of nausea and left lower quadrant pain for the last 3 days she does have a history of ovarian cysts and says that this feels somewhat like that it is a sharp nonradiating pain she also has had a vaginal discharge which has been foul-smelling she has thrown up once today no diarrhea her last bowel movement was yesterday denies any dysuria or hematuria her last menstrual period was a week ago but she is still having some bleeding. Nursing Notes were all reviewed and agreed with or any disagreements were addressed in the HPI. REVIEW OF SYSTEMS :      Review of Systems     systems reviewed and negative except as in HPI/MDM    SURGICAL HISTORY     Past Surgical History:   Procedure Laterality Date    ABDOMEN SURGERY      cleaned out drainage from an ovarian cyst rupture    BREAST SURGERY Right     mass removed     SECTION      DENTAL SURGERY         CURRENTMEDICATIONS       Previous Medications    ACETAMINOPHEN (TYLENOL) 500 MG TABLET    Take 1 tablet by mouth every 6 hours as needed for Pain    BUSPIRONE (BUSPAR) 15 MG TABLET    Take 15 mg by mouth 3 times daily    IBUPROFEN (ADVIL;MOTRIN) 600 MG TABLET    Take 1 tablet by mouth every 6 hours as needed for Pain or Fever    ZOLPIDEM (AMBIEN) 5 MG TABLET    Take 1 tablet by mouth nightly as needed for Sleep. ALLERGIES     Zoloft [sertraline]    FAMILYHISTORY     History reviewed. No pertinent family history.

## 2023-12-25 NOTE — ED TRIAGE NOTES
Pt with complaints of abdominal pain that has been going on for the past three days. Patient states that she has a history of ovarian cysts. Patient states that she has also been having breakthrough breathing.

## 2024-01-09 ENCOUNTER — APPOINTMENT (OUTPATIENT)
Dept: GENERAL RADIOLOGY | Facility: HOSPITAL | Age: 29
End: 2024-01-09
Attending: EMERGENCY MEDICINE
Payer: COMMERCIAL

## 2024-01-09 ENCOUNTER — HOSPITAL ENCOUNTER (EMERGENCY)
Facility: HOSPITAL | Age: 29
Discharge: HOME | End: 2024-01-09
Attending: EMERGENCY MEDICINE
Payer: COMMERCIAL

## 2024-01-09 VITALS
RESPIRATION RATE: 20 BRPM | DIASTOLIC BLOOD PRESSURE: 82 MMHG | OXYGEN SATURATION: 97 % | SYSTOLIC BLOOD PRESSURE: 118 MMHG | TEMPERATURE: 98.3 F | HEART RATE: 108 BPM | BODY MASS INDEX: 25.74 KG/M2 | WEIGHT: 164 LBS | HEIGHT: 67 IN

## 2024-01-09 DIAGNOSIS — S93.602A SPRAIN OF LEFT FOOT, INITIAL ENCOUNTER: ICD-10-CM
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
## 2024-01-09 DIAGNOSIS — S60.221A CONTUSION OF RIGHT HAND, INITIAL ENCOUNTER: ICD-10-CM

## 2024-01-09 PROCEDURE — 99283 EMERGENCY DEPT VISIT LOW MDM: CPT

## 2024-01-09 PROCEDURE — 73630 X-RAY EXAM OF FOOT: CPT

## 2024-01-09 PROCEDURE — 73110 X-RAY EXAM OF WRIST: CPT

## 2024-01-09 PROCEDURE — 73130 X-RAY EXAM OF HAND: CPT

## 2024-01-09 PROCEDURE — 6370000000 HC RX 637 (ALT 250 FOR IP): Performed by: EMERGENCY MEDICINE

## 2024-01-09 RX ORDER — AMOXICILLIN AND CLAVULANATE POTASSIUM 875; 125 MG/1; MG/1
1 TABLET, FILM COATED ORAL ONCE
Status: COMPLETED | OUTPATIENT
Start: 2024-01-09 | End: 2024-01-09

## 2024-01-09 RX ORDER — AMOXICILLIN AND CLAVULANATE POTASSIUM 875; 125 MG/1; MG/1
1 TABLET, FILM COATED ORAL 2 TIMES DAILY
Qty: 20 TABLET | Refills: 0 | Status: SHIPPED | OUTPATIENT
Start: 2024-01-09 | End: 2024-01-19

## 2024-01-09 RX ADMIN — AMOXICILLIN AND CLAVULANATE POTASSIUM 1 TABLET: 875; 125 TABLET, FILM COATED ORAL at 22:39

## 2024-01-09 ASSESSMENT — PAIN DESCRIPTION - PAIN TYPE: TYPE: ACUTE PAIN

## 2024-01-09 ASSESSMENT — PAIN SCALES - GENERAL: PAINLEVEL_OUTOF10: 7

## 2024-01-09 ASSESSMENT — PAIN - FUNCTIONAL ASSESSMENT: PAIN_FUNCTIONAL_ASSESSMENT: 0-10

## 2024-01-09 ASSESSMENT — LIFESTYLE VARIABLES
HOW OFTEN DO YOU HAVE A DRINK CONTAINING ALCOHOL: MONTHLY OR LESS
HOW MANY STANDARD DRINKS CONTAINING ALCOHOL DO YOU HAVE ON A TYPICAL DAY: 1 OR 2

## 2024-01-09 ASSESSMENT — PAIN DESCRIPTION - ORIENTATION: ORIENTATION: LEFT

## 2024-01-09 ASSESSMENT — PAIN DESCRIPTION - LOCATION: LOCATION: FOOT

## 2024-01-09 ASSESSMENT — PAIN DESCRIPTION - DESCRIPTORS: DESCRIPTORS: THROBBING
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
## 2024-01-10 NOTE — ED NOTES
Pt verbalized understanding of DC and FU instructions. No questions at DC. Pt left ambulating well w/o incident.

## 2024-01-22 ENCOUNTER — HOSPITAL ENCOUNTER (OUTPATIENT)
Facility: HOSPITAL | Age: 29
Discharge: HOME | End: 2024-01-22
Payer: COMMERCIAL

## 2024-01-22 LAB
FLUAV AG NPH QL: NEGATIVE
FLUBV AG NPH QL: NEGATIVE
SARS-COV-2 RDRP RESP QL NAA+PROBE: NOT DETECTED

## 2024-01-22 PROCEDURE — 87804 INFLUENZA ASSAY W/OPTIC: CPT

## 2024-01-22 PROCEDURE — 87635 SARS-COV-2 COVID-19 AMP PRB: CPT

## 2024-01-31 ENCOUNTER — HOSPITAL ENCOUNTER (OUTPATIENT)
Facility: HOSPITAL | Age: 29
Discharge: HOME | End: 2024-01-31
Payer: COMMERCIAL

## 2024-01-31 PROCEDURE — 36415 COLL VENOUS BLD VENIPUNCTURE: CPT

## 2024-01-31 PROCEDURE — 86780 TREPONEMA PALLIDUM: CPT

## 2024-01-31 PROCEDURE — 87390 HIV-1 AG IA: CPT

## 2024-01-31 PROCEDURE — 86702 HIV-2 ANTIBODY: CPT

## 2024-01-31 PROCEDURE — 87491 CHLMYD TRACH DNA AMP PROBE: CPT

## 2024-01-31 PROCEDURE — 86701 HIV-1ANTIBODY: CPT

## 2024-01-31 PROCEDURE — 81015 MICROSCOPIC EXAM OF URINE: CPT

## 2024-01-31 PROCEDURE — 80074 ACUTE HEPATITIS PANEL: CPT

## 2024-01-31 PROCEDURE — 87591 N.GONORRHOEAE DNA AMP PROB: CPT

## 2024-02-01 LAB
HAV IGM SERPL QL IA: NORMAL
HBV CORE IGM SERPL QL IA: NORMAL
HBV SURFACE AG SERPL QL IA: NORMAL
HCV AB SERPL QL IA: NORMAL
TRICHOMONAS #/AREA URNS HPF: NORMAL /[HPF]

## 2024-02-02 LAB
HIV 1+2 AB+HIV1 P24 AG SERPL QL IA: NORMAL
HIV 2 AB SERPL QL IA: NORMAL
HIV1 AB SERPL QL IA: NORMAL
HIV1 P24 AG SERPL QL IA: NORMAL
REAGIN+T PALLIDUM IGG+IGM SERPL-IMP: NORMAL

## 2024-02-03 LAB
C TRACH DNA UR QL NAA+PROBE: NEGATIVE
N GONORRHOEA DNA UR QL NAA+PROBE: NEGATIVE

## 2024-02-05 ENCOUNTER — HOSPITAL ENCOUNTER (OUTPATIENT)
Facility: HOSPITAL | Age: 29
Discharge: HOME | End: 2024-02-05
Payer: COMMERCIAL

## 2024-02-05 LAB
FLUAV AG NPH QL: NEGATIVE
FLUBV AG NPH QL: POSITIVE
S PYO AG THROAT QL: NEGATIVE
SARS-COV-2 RDRP RESP QL NAA+PROBE: NOT DETECTED

## 2024-02-05 PROCEDURE — 87635 SARS-COV-2 COVID-19 AMP PRB: CPT

## 2024-02-05 PROCEDURE — 87880 STREP A ASSAY W/OPTIC: CPT

## 2024-02-05 PROCEDURE — 87804 INFLUENZA ASSAY W/OPTIC: CPT

## 2024-02-19 ENCOUNTER — APPOINTMENT (OUTPATIENT)
Dept: GENERAL RADIOLOGY | Facility: HOSPITAL | Age: 29
End: 2024-02-19
Attending: FAMILY MEDICINE
Payer: MEDICAID

## 2024-02-19 ENCOUNTER — HOSPITAL ENCOUNTER (EMERGENCY)
Facility: HOSPITAL | Age: 29
Discharge: HOME | End: 2024-02-19
Attending: FAMILY MEDICINE
Payer: MEDICAID

## 2024-02-19 VITALS
WEIGHT: 173 LBS | TEMPERATURE: 98.6 F | BODY MASS INDEX: 26.22 KG/M2 | OXYGEN SATURATION: 99 % | HEIGHT: 68 IN | HEART RATE: 78 BPM | SYSTOLIC BLOOD PRESSURE: 111 MMHG | RESPIRATION RATE: 18 BRPM | DIASTOLIC BLOOD PRESSURE: 76 MMHG

## 2024-02-19 DIAGNOSIS — S90.32XA CONTUSION OF LEFT FOOT, INITIAL ENCOUNTER: Primary | ICD-10-CM

## 2024-02-19 PROCEDURE — 96372 THER/PROPH/DIAG INJ SC/IM: CPT

## 2024-02-19 PROCEDURE — 73630 X-RAY EXAM OF FOOT: CPT

## 2024-02-19 PROCEDURE — 99284 EMERGENCY DEPT VISIT MOD MDM: CPT

## 2024-02-19 PROCEDURE — 6360000002 HC RX W HCPCS: Performed by: FAMILY MEDICINE

## 2024-02-19 RX ORDER — TRAZODONE HYDROCHLORIDE 50 MG/1
50 TABLET ORAL NIGHTLY
COMMUNITY

## 2024-02-19 RX ORDER — OXCARBAZEPINE 600 MG/1
600 TABLET ORAL DAILY
COMMUNITY

## 2024-02-19 RX ORDER — KETOROLAC TROMETHAMINE 30 MG/ML
30 INJECTION, SOLUTION INTRAMUSCULAR; INTRAVENOUS ONCE
Status: COMPLETED | OUTPATIENT
Start: 2024-02-19 | End: 2024-02-19

## 2024-02-19 RX ORDER — CLONAZEPAM 0.5 MG/1
0.5 TABLET ORAL 3 TIMES DAILY
COMMUNITY

## 2024-02-19 RX ADMIN — KETOROLAC TROMETHAMINE 30 MG: 30 INJECTION, SOLUTION INTRAMUSCULAR; INTRAVENOUS at 20:06

## 2024-02-19 ASSESSMENT — PAIN DESCRIPTION - ORIENTATION
ORIENTATION: LEFT

## 2024-02-19 ASSESSMENT — PAIN SCALES - GENERAL
PAINLEVEL_OUTOF10: 5

## 2024-02-19 ASSESSMENT — PAIN DESCRIPTION - LOCATION
LOCATION: FOOT

## 2024-02-19 ASSESSMENT — PAIN - FUNCTIONAL ASSESSMENT: PAIN_FUNCTIONAL_ASSESSMENT: 0-10

## 2024-02-20 NOTE — ED NOTES
Reviewed discharge instructions reviewed with patient, verbalizes understanding. Patient educated on use of crutches, verbalizes understanding. Patient alert and oriented x 4, no further questions or concerns. Patient discharged home via POV.

## 2024-02-20 NOTE — ED PROVIDER NOTES
(DESYREL) 50 MG TABLET    Take 1 tablet by mouth nightly 1-2 tablets nightly       ALLERGIES     Zoloft [sertraline]    FAMILYHISTORY     History reviewed. No pertinent family history.     SOCIAL HISTORY       Social History     Tobacco Use    Smoking status: Never    Smokeless tobacco: Never   Vaping Use    Vaping Use: Former    Substances: Nicotine   Substance Use Topics    Alcohol use: No     Comment: rare    Drug use: No       SCREENINGS        Juan Daniel Coma Scale  Eye Opening: Spontaneous  Best Verbal Response: Oriented  Best Motor Response: Obeys commands  Juan Daniel Coma Scale Score: 15                CIWA Assessment  BP: 107/73  Pulse: 78           PHYSICAL EXAM  1 or more Elements     ED Triage Vitals   BP Temp Temp src Pulse Respirations SpO2 Height Weight - Scale   02/19/24 1944 02/19/24 1944 -- 02/19/24 1944 02/19/24 1944 02/19/24 1944 02/19/24 1938 02/19/24 1938   120/69 98.6 °F (37 °C)  78 18 98 % 1.715 m (5' 7.5\") 78.5 kg (173 lb)       Physical Exam    My pulse oximetry interpretation is which is within the normal range    GENERAL APPEARANCE: Awake and alert. Cooperative. No acute distress.  HEAD:  Atraumatic.  EYES: EOM's grossly intact.   ENT: Mucous membranes are moist.  No trismus.  NECK:  Trachea midline.  HEART: Regular rate and rhythm  LUNGS: Respirations unlabored. CTAB  ABDOMEN: Soft. Non-tender. No guarding or rebound.  EXTREMITIES: No acute deformities.  Mild bruising to the dorsum of the left  to palpation in the area.  No deformity no swelling no signs neurovascular mice full range of motion  SKIN: Warm and dry.  NEUROLOGICAL: Moves all 4 extremities spontaneously.  PSYCHIATRIC: Normal mood.        DIAGNOSTIC RESULTS   LABS:    Labs Reviewed - No data to display    When ordered only abnormal lab results are displayed. All other labs were within normal range or not returned as of this dictation.    EKG:     RADIOLOGY:   Non-plain film images such as CT, Ultrasound and MRI are read
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
## 2024-03-12 ENCOUNTER — HOSPITAL ENCOUNTER (EMERGENCY)
Facility: HOSPITAL | Age: 29
Discharge: HOME | End: 2024-03-12
Attending: EMERGENCY MEDICINE
Payer: MEDICAID

## 2024-03-12 ENCOUNTER — NURSE TRIAGE (OUTPATIENT)
Dept: CALL CENTER | Facility: HOSPITAL | Age: 29
End: 2024-03-12
Payer: MEDICAID

## 2024-03-12 ENCOUNTER — APPOINTMENT (OUTPATIENT)
Dept: ULTRASOUND IMAGING | Facility: HOSPITAL | Age: 29
End: 2024-03-12
Payer: MEDICAID

## 2024-03-12 VITALS
TEMPERATURE: 99 F | SYSTOLIC BLOOD PRESSURE: 127 MMHG | OXYGEN SATURATION: 100 % | HEART RATE: 87 BPM | WEIGHT: 181 LBS | HEIGHT: 67 IN | RESPIRATION RATE: 16 BRPM | BODY MASS INDEX: 28.41 KG/M2 | DIASTOLIC BLOOD PRESSURE: 80 MMHG

## 2024-03-12 DIAGNOSIS — T83.32XA MALPOSITIONED INTRAUTERINE DEVICE (IUD), INITIAL ENCOUNTER: Primary | ICD-10-CM

## 2024-03-12 DIAGNOSIS — R10.2 PELVIC PAIN: ICD-10-CM

## 2024-03-12 LAB
BILIRUB UR QL STRIP.AUTO: NEGATIVE
CLARITY UR: CLEAR
COLOR UR: YELLOW
EPI CELLS #/AREA URNS HPF: NORMAL /HPF (ref 0–5)
GLUCOSE UR STRIP.AUTO-MCNC: NEGATIVE MG/DL
HCG UR QL: NEGATIVE
HGB UR QL STRIP.AUTO: ABNORMAL
KETONES UR STRIP.AUTO-MCNC: NEGATIVE MG/DL
LEUKOCYTE ESTERASE UR QL STRIP.AUTO: NEGATIVE
NITRITE UR QL STRIP.AUTO: NEGATIVE
PH UR STRIP.AUTO: 7 [PH] (ref 5–8)
PROT UR STRIP.AUTO-MCNC: NEGATIVE MG/DL
RBC #/AREA URNS HPF: NORMAL /HPF (ref 0–4)
SP GR UR STRIP.AUTO: 1.02 (ref 1–1.03)
UA COMPLETE W REFLEX CULTURE PNL UR: ABNORMAL
UA DIPSTICK W REFLEX MICRO PNL UR: YES
URN SPEC COLLECT METH UR: ABNORMAL
UROBILINOGEN UR STRIP-ACNC: 0.2 E.U./DL
WBC #/AREA URNS HPF: NORMAL /HPF (ref 0–5)

## 2024-03-12 PROCEDURE — 6360000002 HC RX W HCPCS: Performed by: EMERGENCY MEDICINE

## 2024-03-12 PROCEDURE — 99284 EMERGENCY DEPT VISIT MOD MDM: CPT

## 2024-03-12 PROCEDURE — 76830 TRANSVAGINAL US NON-OB: CPT

## 2024-03-12 PROCEDURE — 81001 URINALYSIS AUTO W/SCOPE: CPT

## 2024-03-12 PROCEDURE — 84703 CHORIONIC GONADOTROPIN ASSAY: CPT

## 2024-03-12 PROCEDURE — 96372 THER/PROPH/DIAG INJ SC/IM: CPT

## 2024-03-12 RX ORDER — KETOROLAC TROMETHAMINE 30 MG/ML
30 INJECTION, SOLUTION INTRAMUSCULAR; INTRAVENOUS ONCE
Status: COMPLETED | OUTPATIENT
Start: 2024-03-12 | End: 2024-03-12

## 2024-03-12 RX ORDER — HYDROCODONE BITARTRATE AND ACETAMINOPHEN 5; 325 MG/1; MG/1
1 TABLET ORAL EVERY 6 HOURS PRN
Qty: 12 TABLET | Refills: 0 | Status: SHIPPED | OUTPATIENT
Start: 2024-03-12 | End: 2024-03-15

## 2024-03-12 RX ADMIN — KETOROLAC TROMETHAMINE 30 MG: 60 INJECTION, SOLUTION INTRAMUSCULAR at 13:58

## 2024-03-12 NOTE — ED TRIAGE NOTES
Pt states that she is having severe pain from placement of an IUD yesterday. Patient states that she also had a biopsy done. Patient complaining of pain since late last night. Pt states that she called her OBGYN and that they told her to come to ED to get an ultrasound to check placement.

## 2024-03-12 NOTE — ED PROVIDER NOTES
dictations but occasionally words are mis-transcribed.)    Bucky Agudelo DO (electronically signed)          Bucky Agudelo DO  03/13/24 0892

## 2024-03-13 NOTE — TELEPHONE ENCOUNTER
"Reason for Disposition   Health Information question, no triage required and triager able to answer question    Additional Information   Negative: [1] Caller is not with the adult (patient) AND [2] reporting urgent symptoms   Negative: Lab result questions   Negative: Medication questions   Negative: Caller can't be reached by phone   Negative: Caller has already spoken to PCP or another triager   Negative: RN needs further essential information from caller in order to complete triage   Negative: Requesting regular office appointment   Negative: [1] Caller requesting NON-URGENT health information AND [2] PCP's office is the best resource    Answer Assessment - Initial Assessment Questions  1. REASON FOR CALL or QUESTION: \"What is your reason for calling today?\" or \"How can I best help you?\" or \"What question do you have that I can help answer?\"      I want to know if I go to [facility] will they take out my IUD. It is hurting me really bad and I want it taken out tonight.   I told her that if she went to the ER they may consult OB/GYN for her but I could not tell her what they would do from there.    Protocols used: Information Only Call - No Triage-ADULT-    "

## 2024-03-23 ENCOUNTER — HOSPITAL ENCOUNTER (EMERGENCY)
Facility: HOSPITAL | Age: 29
Discharge: HOME | End: 2024-03-23
Attending: EMERGENCY MEDICINE
Payer: MEDICAID

## 2024-03-23 ENCOUNTER — APPOINTMENT (OUTPATIENT)
Dept: ULTRASOUND IMAGING | Facility: HOSPITAL | Age: 29
End: 2024-03-23
Payer: MEDICAID

## 2024-03-23 ENCOUNTER — OFFICE VISIT (OUTPATIENT)
Dept: PRIMARY CARE CLINIC | Age: 29
End: 2024-03-23
Payer: MEDICAID

## 2024-03-23 VITALS
OXYGEN SATURATION: 98 % | TEMPERATURE: 98.4 F | HEART RATE: 88 BPM | HEIGHT: 67 IN | DIASTOLIC BLOOD PRESSURE: 64 MMHG | SYSTOLIC BLOOD PRESSURE: 108 MMHG | RESPIRATION RATE: 18 BRPM | BODY MASS INDEX: 27.94 KG/M2 | WEIGHT: 178 LBS

## 2024-03-23 VITALS
HEIGHT: 67 IN | TEMPERATURE: 98.6 F | DIASTOLIC BLOOD PRESSURE: 73 MMHG | WEIGHT: 178 LBS | BODY MASS INDEX: 27.94 KG/M2 | SYSTOLIC BLOOD PRESSURE: 109 MMHG | OXYGEN SATURATION: 97 % | HEART RATE: 89 BPM

## 2024-03-23 DIAGNOSIS — Z34.90 PREGNANCY, UNSPECIFIED GESTATIONAL AGE: ICD-10-CM

## 2024-03-23 DIAGNOSIS — H10.33 ACUTE BACTERIAL CONJUNCTIVITIS OF BOTH EYES: Primary | ICD-10-CM

## 2024-03-23 DIAGNOSIS — R10.31 ACUTE BILATERAL LOWER ABDOMINAL PAIN: Primary | ICD-10-CM

## 2024-03-23 DIAGNOSIS — R10.32 ACUTE BILATERAL LOWER ABDOMINAL PAIN: Primary | ICD-10-CM

## 2024-03-23 LAB
ALBUMIN SERPL-MCNC: 4.4 G/DL (ref 3.5–5.2)
ALBUMIN/GLOB SERPL: 1.5 G/DL
ALP SERPL-CCNC: 101 U/L (ref 39–117)
ALT SERPL W P-5'-P-CCNC: 11 U/L (ref 1–33)
ANION GAP SERPL CALCULATED.3IONS-SCNC: 9.8 MMOL/L (ref 5–15)
AST SERPL-CCNC: 17 U/L (ref 1–32)
BACTERIA UR QL AUTO: ABNORMAL /HPF
BASOPHILS # BLD AUTO: 0.05 10*3/MM3 (ref 0–0.2)
BASOPHILS NFR BLD AUTO: 0.6 % (ref 0–1.5)
BILIRUB SERPL-MCNC: 0.3 MG/DL (ref 0–1.2)
BILIRUB UR QL STRIP: NEGATIVE
BUN SERPL-MCNC: 9 MG/DL (ref 6–20)
BUN/CREAT SERPL: 8 (ref 7–25)
CALCIUM SPEC-SCNC: 9.1 MG/DL (ref 8.6–10.5)
CHLORIDE SERPL-SCNC: 103 MMOL/L (ref 98–107)
CLARITY UR: CLEAR
CO2 SERPL-SCNC: 27.2 MMOL/L (ref 22–29)
COLOR UR: YELLOW
CREAT SERPL-MCNC: 1.12 MG/DL (ref 0.57–1)
DEPRECATED RDW RBC AUTO: 38.4 FL (ref 37–54)
EGFRCR SERPLBLD CKD-EPI 2021: 69.7 ML/MIN/1.73
EOSINOPHIL # BLD AUTO: 0.14 10*3/MM3 (ref 0–0.4)
EOSINOPHIL NFR BLD AUTO: 1.8 % (ref 0.3–6.2)
ERYTHROCYTE [DISTWIDTH] IN BLOOD BY AUTOMATED COUNT: 12.1 % (ref 12.3–15.4)
GLOBULIN UR ELPH-MCNC: 3 GM/DL
GLUCOSE SERPL-MCNC: 112 MG/DL (ref 65–99)
GLUCOSE UR STRIP-MCNC: NEGATIVE MG/DL
HCG INTACT+B SERPL-ACNC: 65.32 MIU/ML
HCT VFR BLD AUTO: 39 % (ref 34–46.6)
HGB BLD-MCNC: 13.3 G/DL (ref 12–15.9)
HGB UR QL STRIP.AUTO: NEGATIVE
HOLD SPECIMEN: NORMAL
HOLD SPECIMEN: NORMAL
HYALINE CASTS UR QL AUTO: ABNORMAL /LPF
IMM GRANULOCYTES # BLD AUTO: 0.02 10*3/MM3 (ref 0–0.05)
IMM GRANULOCYTES NFR BLD AUTO: 0.3 % (ref 0–0.5)
KETONES UR QL STRIP: NEGATIVE
LEUKOCYTE ESTERASE UR QL STRIP.AUTO: ABNORMAL
LIPASE SERPL-CCNC: 28 U/L (ref 13–60)
LYMPHOCYTES # BLD AUTO: 1.52 10*3/MM3 (ref 0.7–3.1)
LYMPHOCYTES NFR BLD AUTO: 19.1 % (ref 19.6–45.3)
MCH RBC QN AUTO: 29.4 PG (ref 26.6–33)
MCHC RBC AUTO-ENTMCNC: 34.1 G/DL (ref 31.5–35.7)
MCV RBC AUTO: 86.1 FL (ref 79–97)
MONOCYTES # BLD AUTO: 0.48 10*3/MM3 (ref 0.1–0.9)
MONOCYTES NFR BLD AUTO: 6 % (ref 5–12)
NEUTROPHILS NFR BLD AUTO: 5.73 10*3/MM3 (ref 1.7–7)
NEUTROPHILS NFR BLD AUTO: 72.2 % (ref 42.7–76)
NITRITE UR QL STRIP: NEGATIVE
NRBC BLD AUTO-RTO: 0 /100 WBC (ref 0–0.2)
PH UR STRIP.AUTO: 6.5 [PH] (ref 5–8)
PLATELET # BLD AUTO: 271 10*3/MM3 (ref 140–450)
PMV BLD AUTO: 10.6 FL (ref 6–12)
POTASSIUM SERPL-SCNC: 3.8 MMOL/L (ref 3.5–5.2)
PROT SERPL-MCNC: 7.4 G/DL (ref 6–8.5)
PROT UR QL STRIP: NEGATIVE
RBC # BLD AUTO: 4.53 10*6/MM3 (ref 3.77–5.28)
RBC # UR STRIP: ABNORMAL /HPF
REF LAB TEST METHOD: ABNORMAL
SODIUM SERPL-SCNC: 140 MMOL/L (ref 136–145)
SP GR UR STRIP: 1.01 (ref 1–1.03)
SQUAMOUS #/AREA URNS HPF: ABNORMAL /HPF
UROBILINOGEN UR QL STRIP: ABNORMAL
WBC # UR STRIP: ABNORMAL /HPF
WBC NRBC COR # BLD AUTO: 7.94 10*3/MM3 (ref 3.4–10.8)
WHOLE BLOOD HOLD COAG: NORMAL
WHOLE BLOOD HOLD SPECIMEN: NORMAL

## 2024-03-23 PROCEDURE — 81001 URINALYSIS AUTO W/SCOPE: CPT | Performed by: EMERGENCY MEDICINE

## 2024-03-23 PROCEDURE — 99213 OFFICE O/P EST LOW 20 MIN: CPT | Performed by: NURSE PRACTITIONER

## 2024-03-23 PROCEDURE — 99284 EMERGENCY DEPT VISIT MOD MDM: CPT

## 2024-03-23 PROCEDURE — 76817 TRANSVAGINAL US OBSTETRIC: CPT

## 2024-03-23 PROCEDURE — 85025 COMPLETE CBC W/AUTO DIFF WBC: CPT | Performed by: EMERGENCY MEDICINE

## 2024-03-23 PROCEDURE — 83690 ASSAY OF LIPASE: CPT | Performed by: EMERGENCY MEDICINE

## 2024-03-23 PROCEDURE — 84702 CHORIONIC GONADOTROPIN TEST: CPT | Performed by: EMERGENCY MEDICINE

## 2024-03-23 PROCEDURE — 80053 COMPREHEN METABOLIC PANEL: CPT | Performed by: EMERGENCY MEDICINE

## 2024-03-23 RX ORDER — ALBUTEROL SULFATE 90 UG/1
1 AEROSOL, METERED RESPIRATORY (INHALATION) EVERY 6 HOURS PRN
COMMUNITY

## 2024-03-23 RX ORDER — HYDROXYZINE HYDROCHLORIDE 25 MG/1
25 TABLET, FILM COATED ORAL EVERY 8 HOURS PRN
COMMUNITY
Start: 2024-03-06

## 2024-03-23 RX ORDER — ZOLPIDEM TARTRATE 12.5 MG/1
12.5 TABLET, FILM COATED, EXTENDED RELEASE ORAL NIGHTLY PRN
COMMUNITY
Start: 2024-03-12

## 2024-03-23 RX ORDER — ERYTHROMYCIN 5 MG/G
OINTMENT OPHTHALMIC
Qty: 3.5 G | Refills: 0 | Status: SHIPPED | OUTPATIENT
Start: 2024-03-23 | End: 2024-04-02

## 2024-03-23 RX ORDER — SODIUM CHLORIDE 0.9 % (FLUSH) 0.9 %
10 SYRINGE (ML) INJECTION AS NEEDED
Status: DISCONTINUED | OUTPATIENT
Start: 2024-03-23 | End: 2024-03-23 | Stop reason: HOSPADM

## 2024-03-23 ASSESSMENT — PATIENT HEALTH QUESTIONNAIRE - PHQ9: DEPRESSION UNABLE TO ASSESS: URGENT/EMERGENT SITUATION

## 2024-03-23 NOTE — PROGRESS NOTES
SUBJECTIVE:    Patient ID: Patrick Torres is a 28 y.o.female.    Chief Complaint   Patient presents with    Conjunctivitis       HPI:  29 yo W F with complaints of eye irritation and itching.  Started two days ago.  She took some OTC benadryl and allergy medication but it did not help.  Worse in the morning when her eyes are caked together.    Patient's medications, allergies, past medical, surgical, social and family histories were reviewed and updated as appropriate in electronic medical record.      Current Outpatient Medications on File Prior to Visit   Medication Sig Dispense Refill    albuterol sulfate HFA (VENTOLIN HFA) 108 (90 Base) MCG/ACT inhaler Inhale 1 puff into the lungs every 6 hours as needed      hydrOXYzine HCl (ATARAX) 25 MG tablet Take 1 tablet by mouth every 8 hours as needed      zolpidem (AMBIEN CR) 12.5 MG extended release tablet Take 1 tablet by mouth nightly as needed.      brexpiprazole (REXULTI) 0.5 MG TABS tablet Take 2 tablets by mouth once      clonazePAM (KLONOPIN) 0.5 MG tablet Take 1 tablet by mouth 2 times daily as needed.      brexpiprazole (REXULTI) 0.5 MG TABS tablet Take 1 tablet by mouth daily      OXcarbazepine ER (OXTELLAR XR) 600 MG TB24 Take 600 mg by mouth daily       No current facility-administered medications on file prior to visit.        Review of Systems    Past Medical History:   Diagnosis Date    Anxiety     Depression     Fibromyalgia     Migraine     Miscarriage     Ovarian cyst 2017    ruptured    POTS (postural orthostatic tachycardia syndrome)     Strep pharyngitis      Past Surgical History:   Procedure Laterality Date    ABDOMEN SURGERY      cleaned out drainage from an ovarian cyst rupture    BREAST SURGERY Right     mass removed     SECTION      DENTAL SURGERY       No family history on file.   Social History     Tobacco Use   Smoking Status Never   Smokeless Tobacco Never       OBJECTIVE:   Wt Readings from Last 3 Encounters:

## 2024-03-23 NOTE — ED PROVIDER NOTES
[place]    EMERGENCY DEPARTMENT ENCOUNTER      Pt Name: Ashish Wilkerson  MRN: [id number]  YOB: 1995  Date of evaluation: 3/23/2024  Provider: Manuel Castro MD    CHIEF COMPLAINT       Chief Complaint   Patient presents with    Pregnancy Problem         HISTORY OF PRESENT ILLNESS   Ashish Wilkerson is a 28 y.o. female who presents to the emergency department with 1 day of bilateral lower abdominal cramping.  Reports recent positive pregnancy test.  She has had no vaginal bleeding.  No urinary symptoms, vomiting or diarrhea, fever, or chills.      Nursing notes were reviewed.    REVIEW OF SYSTEMS     ROS:  A chief complaint appropriate review of systems was completed and is negative except as noted in the HPI.      PAST MEDICAL HISTORY     Past Medical History:   Diagnosis Date    Anxiety     Asthma     Depression     Migraine          SURGICAL HISTORY       Past Surgical History:   Procedure Laterality Date    BREAST BIOPSY Right 2021    EXCISIONAL BY SURGEON (NO MAMMO OR US IMAGING)    BREAST MASS EXCISION       SECTION      DENTAL PROCEDURE      DIAGNOSTIC LAPAROSCOPY N/A 2017    Procedure: DIAGNOSTIC LAPAROSCOPY;  Surgeon: Dandy Shelby MD;  Location: [facility];  Service:          CURRENT MEDICATIONS       Current Facility-Administered Medications:     sodium chloride 0.9 % flush 10 mL, 10 mL, Intravenous, PRN, Manuel Castro MD    Current Outpatient Medications:     albuterol sulfate  (90 Base) MCG/ACT inhaler, Inhale 2 puffs Every 4 (Four) Hours As Needed for Wheezing., Disp: 6.7 g, Rfl: 5    azelastine (ASTELIN) 0.1 % nasal spray, , Disp: , Rfl:     bisoprolol (ZEBeta) 5 MG tablet, TAKE 0.5 TABLETS BY MOUTH DAILY., Disp: 30 tablet, Rfl: 5    budesonide-formoterol (SYMBICORT) 80-4.5 MCG/ACT inhaler, Inhale 2 puffs 2 (Two) Times a Day., Disp: 1 each, Rfl: 4    buPROPion SR (WELLBUTRIN SR) 150 MG 12 hr tablet, , Disp: , Rfl:     busPIRone (BUSPAR) 15  MG tablet, 3 (Three) Times a Day., Disp: , Rfl:     busPIRone (BUSPAR) 15 MG tablet, Take 1 tablet by mouth., Disp: , Rfl:     clonazePAM (KlonoPIN) 0.5 MG tablet, , Disp: , Rfl:     erythromycin (ROMYCIN) 5 MG/GM ophthalmic ointment, , Disp: , Rfl:     etonogestrel-ethinyl estradiol (NUVARING) 0.12-0.015 MG/24HR vaginal ring, , Disp: , Rfl:     gabapentin (NEURONTIN) 300 MG capsule, , Disp: , Rfl:     hydrOXYzine (ATARAX) 50 MG tablet, , Disp: , Rfl:     lamoTRIgine (LaMICtal) 100 MG tablet, Daily., Disp: , Rfl:     omeprazole (priLOSEC) 40 MG capsule, Take 1 capsule by mouth Daily., Disp: 30 capsule, Rfl: 5    Oxtellar  MG tablet sustained-release 24 hour, , Disp: , Rfl:     terbinafine (lamiSIL) 250 MG tablet, , Disp: , Rfl:     topiramate (TOPAMAX) 100 MG tablet, Take 150 mg by mouth., Disp: , Rfl:     Topiramate  MG capsule extended-release 24 hour sprinkle, , Disp: , Rfl:     traZODone (DESYREL) 50 MG tablet, Every Night., Disp: , Rfl:     ALLERGIES     Zoloft [sertraline hcl]    FAMILY HISTORY       Family History   Problem Relation Age of Onset    Ovarian cancer Mother 25    Breast cancer Mother 32    Cancer Mother     Cystic fibrosis Mother     Lupus Mother           SOCIAL HISTORY       Social History     Socioeconomic History    Marital status:    Tobacco Use    Smoking status: Every Day     Types: Cigarettes     Passive exposure: Current    Smokeless tobacco: Never   Vaping Use    Vaping status: Every Day    Substances: Nicotine    Passive vaping exposure: Yes   Substance and Sexual Activity    Alcohol use: Yes    Drug use: Yes     Types: Cocaine(coke)    Sexual activity: Yes     Partners: Male     Birth control/protection: Vaginal insert contraception         PHYSICAL EXAM    (up to 7 for level 4, 8 or more for level 5)     Vitals:    03/23/24 1832 03/23/24 1930 03/23/24 2100 03/23/24 2128   BP: 129/69 122/80 109/70 110/55   BP Location: Left arm      Patient Position: Sitting     "  Pulse: 108      Resp: 18      Temp: 98.4 °F (36.9 °C)      TempSrc: Oral      SpO2: 98% 99% 99% 99%   Weight: 80.7 kg (178 lb)      Height: 170.2 cm (67\")          General: Awake, alert, no acute distress.  HEENT: Conjunctivae normal.  Neck: Trachea midline.  Cardiac: Heart regular rate, rhythm, no murmurs, rubs, or gallops  Lungs: Lungs are clear to auscultation, there is no wheezing, rhonchi, or rales. There is no use of accessory muscles.  Chest wall: There is no tenderness to palpation over the chest wall or over ribs  Abdomen: Abdomen is soft, nontender, nondistended. There are no firm or pulsatile masses, no rebound rigidity or guarding.   Musculoskeletal: No deformity.  Neuro: Alert and oriented x 4.  Dermatology: Skin is warm and dry  Psych: Mentation is grossly normal, cognition is grossly normal. Affect is appropriate.        DIAGNOSTIC RESULTS     EKG: All EKGs are interpreted by the Emergency Department Physician who either signs or Co-signs this chart in the absence of a cardiologist.    No orders to display         RADIOLOGY:   [x] Radiologist's Report Reviewed:  US Ob Transvaginal   Final Result   Unremarkable exam, specifically no IUP demonstrated.  Recommend   gynecologic follow-up      Authenticated and Electronically Signed by Juan Miguel Evans MD on   03/23/2024 08:37:46 PM          I ordered and independently reviewed the above noted radiographic studies.        LABS:    I have reviewed and interpreted all of the currently available lab results from this visit (if applicable):  Results for orders placed or performed during the hospital encounter of 03/23/24   Comprehensive Metabolic Panel    Specimen: Blood   Result Value Ref Range    Glucose 112 (H) 65 - 99 mg/dL    BUN 9 6 - 20 mg/dL    Creatinine 1.12 (H) 0.57 - 1.00 mg/dL    Sodium 140 136 - 145 mmol/L    Potassium 3.8 3.5 - 5.2 mmol/L    Chloride 103 98 - 107 mmol/L    CO2 27.2 22.0 - 29.0 mmol/L    Calcium 9.1 8.6 - 10.5 mg/dL    Total Protein " 7.4 6.0 - 8.5 g/dL    Albumin 4.4 3.5 - 5.2 g/dL    ALT (SGPT) 11 1 - 33 U/L    AST (SGOT) 17 1 - 32 U/L    Alkaline Phosphatase 101 39 - 117 U/L    Total Bilirubin 0.3 0.0 - 1.2 mg/dL    Globulin 3.0 gm/dL    A/G Ratio 1.5 g/dL    BUN/Creatinine Ratio 8.0 7.0 - 25.0    Anion Gap 9.8 5.0 - 15.0 mmol/L    eGFR 69.7 >60.0 mL/min/1.73   Lipase    Specimen: Blood   Result Value Ref Range    Lipase 28 13 - 60 U/L   Urinalysis With Microscopic If Indicated (No Culture) - Urine, Clean Catch    Specimen: Urine, Clean Catch   Result Value Ref Range    Color, UA Yellow Yellow, Straw    Appearance, UA Clear Clear    pH, UA 6.5 5.0 - 8.0    Specific Gravity, UA 1.006 1.005 - 1.030    Glucose, UA Negative Negative    Ketones, UA Negative Negative    Bilirubin, UA Negative Negative    Blood, UA Negative Negative    Protein, UA Negative Negative    Leuk Esterase, UA Moderate (2+) (A) Negative    Nitrite, UA Negative Negative    Urobilinogen, UA 0.2 E.U./dL 0.2 - 1.0 E.U./dL   hCG, Quantitative, Pregnancy    Specimen: Blood   Result Value Ref Range    HCG Quantitative 65.32 mIU/mL   CBC Auto Differential    Specimen: Blood   Result Value Ref Range    WBC 7.94 3.40 - 10.80 10*3/mm3    RBC 4.53 3.77 - 5.28 10*6/mm3    Hemoglobin 13.3 12.0 - 15.9 g/dL    Hematocrit 39.0 34.0 - 46.6 %    MCV 86.1 79.0 - 97.0 fL    MCH 29.4 26.6 - 33.0 pg    MCHC 34.1 31.5 - 35.7 g/dL    RDW 12.1 (L) 12.3 - 15.4 %    RDW-SD 38.4 37.0 - 54.0 fl    MPV 10.6 6.0 - 12.0 fL    Platelets 271 140 - 450 10*3/mm3    Neutrophil % 72.2 42.7 - 76.0 %    Lymphocyte % 19.1 (L) 19.6 - 45.3 %    Monocyte % 6.0 5.0 - 12.0 %    Eosinophil % 1.8 0.3 - 6.2 %    Basophil % 0.6 0.0 - 1.5 %    Immature Grans % 0.3 0.0 - 0.5 %    Neutrophils, Absolute 5.73 1.70 - 7.00 10*3/mm3    Lymphocytes, Absolute 1.52 0.70 - 3.10 10*3/mm3    Monocytes, Absolute 0.48 0.10 - 0.90 10*3/mm3    Eosinophils, Absolute 0.14 0.00 - 0.40 10*3/mm3    Basophils, Absolute 0.05 0.00 - 0.20 10*3/mm3     Immature Grans, Absolute 0.02 0.00 - 0.05 10*3/mm3    nRBC 0.0 0.0 - 0.2 /100 WBC   Urinalysis, Microscopic Only - Urine, Clean Catch    Specimen: Urine, Clean Catch   Result Value Ref Range    RBC, UA None Seen None Seen, 0-2 /HPF    WBC, UA 6-10 (A) None Seen, 0-2 /HPF    Bacteria, UA Trace (A) None Seen /HPF    Squamous Epithelial Cells, UA 7-12 (A) None Seen, 0-2 /HPF    Hyaline Casts, UA None Seen None Seen /LPF    Methodology Manual Light Microscopy    Green Top (Gel)   Result Value Ref Range    Extra Tube Hold for add-ons.    Lavender Top   Result Value Ref Range    Extra Tube hold for add-on    Gold Top - SST   Result Value Ref Range    Extra Tube Hold for add-ons.    Light Blue Top   Result Value Ref Range    Extra Tube Hold for add-ons.         If labs were ordered, I independently reviewed the results and considered them in treating the patient.      EMERGENCY DEPARTMENT COURSE and DIFFERENTIAL DIAGNOSIS/MDM:   Vitals:  AS OF 21:48 EDT    BP - 110/55  HR - 108  TEMP - 98.4 °F (36.9 °C) (Oral)  O2 SATS - 99%        Discussion below represents my analysis of pertinent findings related to patient's condition, differential diagnosis, treatment plan and final disposition.      Differential diagnosis:  The differential diagnosis associated with the patient's presentation includes: Ectopic pregnancy, normal pregnancy, miscarriage      Care significantly affected by Social Determinants of Health (housing and economic circumstances, unemployment)    [] Yes     [x] No   If yes, Patient's care significantly limited by  Social Determinants of Health including:    [] Inadequate housing    [] Low income    [] Alcoholism and drug addiction in family    [] Problems related to primary support group    [] Unemployment    [] Problems related to employment    [] Other Social Determinants of Health:       ED Course:    ED Course as of 03/23/24 2148   Sat Mar 23, 2024   2142 Patient very well-appearing on reexamination.  Low  quantitative hCG value.  She has no ongoing abdominal pain and repeat abdominal exam is unremarkable.  Ultrasound was inconclusive in light of her negative quantitative hCG but there is nothing that would suggest ruptured ectopic or other emergent process.  She is already established with an OB/GYN and have counseled her to follow-up with them in 1 week for repeat hCG testing and ultrasound. [NS]      ED Course User Index  [NS] Manuel Castro MD             I had a discussion with the patient/family regarding diagnosis, diagnostic results, treatment plan, and medications.  The patient/family indicated understanding of these instructions.  I spent adequate time at the bedside preceding discharge necessary to personally discuss the aftercare instructions, giving patient education, providing explanations of the results of our evaluations/findings, and my decision making to assure that the patient/family understand the plan of care.  Time was allotted to answer questions at that time and throughout the ED course.  Emphasis was placed on timely follow-up after discharge.  I also discussed the potential for the development of an acute emergent condition requiring further evaluation, admission, or even surgical intervention. I discussed that we found nothing during the visit today indicating the need for further workup, admission, or the presence of an unstable medical condition.  I encouraged the patient to return to the emergency department immediately for ANY concerns, worsening, new complaints, or if symptoms persist and unable to seek follow-up in a timely fashion.  The patient/family expressed understanding and agreement with this plan.  The patient will follow-up with their PCP in 1-2 days for reevaluation.           PROCEDURES:  Procedures    CRITICAL CARE TIME        FINAL IMPRESSION      1. Acute bilateral lower abdominal pain    2. Pregnancy, unspecified gestational age          DISPOSITION/PLAN     ED  Disposition       ED Disposition   Discharge    Condition   Stable    Comment   --                 Comment: Please note this report has been produced using speech recognition software.      Manuel Castro MD  Attending Emergency Physician             Manuel Castro MD  03/23/24 6994

## 2024-03-25 ENCOUNTER — TRANSCRIBE ORDERS (OUTPATIENT)
Dept: LAB | Facility: HOSPITAL | Age: 29
End: 2024-03-25
Payer: MEDICAID

## 2024-03-25 ENCOUNTER — HOSPITAL ENCOUNTER (EMERGENCY)
Facility: HOSPITAL | Age: 29
Discharge: HOME | End: 2024-03-25
Attending: EMERGENCY MEDICINE
Payer: MEDICAID

## 2024-03-25 ENCOUNTER — LAB (OUTPATIENT)
Dept: LAB | Facility: HOSPITAL | Age: 29
End: 2024-03-25
Payer: MEDICAID

## 2024-03-25 VITALS
SYSTOLIC BLOOD PRESSURE: 108 MMHG | TEMPERATURE: 98.3 F | HEART RATE: 82 BPM | WEIGHT: 178 LBS | RESPIRATION RATE: 18 BRPM | OXYGEN SATURATION: 98 % | BODY MASS INDEX: 27.94 KG/M2 | DIASTOLIC BLOOD PRESSURE: 65 MMHG | HEIGHT: 67 IN

## 2024-03-25 DIAGNOSIS — O03.9 MISCARRIAGE: Primary | ICD-10-CM

## 2024-03-25 DIAGNOSIS — N92.6 IRREGULAR MENSTRUAL CYCLE: ICD-10-CM

## 2024-03-25 DIAGNOSIS — N92.6 IRREGULAR MENSTRUAL CYCLE: Primary | ICD-10-CM

## 2024-03-25 LAB
BACTERIA URNS QL MICRO: ABNORMAL /HPF
BILIRUB UR QL STRIP.AUTO: NEGATIVE
CLARITY UR: CLEAR
COLOR UR: YELLOW
EPI CELLS #/AREA URNS HPF: ABNORMAL /HPF (ref 0–5)
GLUCOSE UR STRIP.AUTO-MCNC: NEGATIVE MG/DL
HCG INTACT+B SERPL-ACNC: 55.38 MIU/ML
HGB UR QL STRIP.AUTO: NEGATIVE
KETONES UR STRIP.AUTO-MCNC: NEGATIVE MG/DL
LEUKOCYTE ESTERASE UR QL STRIP.AUTO: ABNORMAL
NITRITE UR QL STRIP.AUTO: NEGATIVE
PH UR STRIP.AUTO: 7 [PH] (ref 5–8)
PROGEST SERPL-MCNC: 0.79 NG/ML
PROT UR STRIP.AUTO-MCNC: NEGATIVE MG/DL
RBC #/AREA URNS HPF: ABNORMAL /HPF (ref 0–4)
SP GR UR STRIP.AUTO: 1.02 (ref 1–1.03)
UA COMPLETE W REFLEX CULTURE PNL UR: ABNORMAL
UA DIPSTICK W REFLEX MICRO PNL UR: YES
URN SPEC COLLECT METH UR: ABNORMAL
UROBILINOGEN UR STRIP-ACNC: 0.2 E.U./DL
WBC #/AREA URNS HPF: ABNORMAL /HPF (ref 0–5)

## 2024-03-25 PROCEDURE — 84144 ASSAY OF PROGESTERONE: CPT

## 2024-03-25 PROCEDURE — 99283 EMERGENCY DEPT VISIT LOW MDM: CPT

## 2024-03-25 PROCEDURE — 81001 URINALYSIS AUTO W/SCOPE: CPT

## 2024-03-25 PROCEDURE — 36415 COLL VENOUS BLD VENIPUNCTURE: CPT

## 2024-03-25 PROCEDURE — 84702 CHORIONIC GONADOTROPIN TEST: CPT

## 2024-03-25 ASSESSMENT — PAIN DESCRIPTION - LOCATION: LOCATION: ABDOMEN

## 2024-03-25 ASSESSMENT — PAIN - FUNCTIONAL ASSESSMENT: PAIN_FUNCTIONAL_ASSESSMENT: 0-10

## 2024-03-25 ASSESSMENT — PAIN DESCRIPTION - ORIENTATION: ORIENTATION: LOWER

## 2024-03-25 ASSESSMENT — PAIN DESCRIPTION - DESCRIPTORS: DESCRIPTORS: CRAMPING

## 2024-03-25 ASSESSMENT — PAIN SCALES - GENERAL: PAINLEVEL_OUTOF10: 5

## 2024-03-25 NOTE — ED NOTES
Discharge instructions discussed with patients, verbalizes understanding. Patient alert and oriented x 4, no further questions or concerns. Patient is agreeable to follow up with OB. Patient discharged home via POV.

## 2024-03-25 NOTE — ED PROVIDER NOTES
are displayed. All other labs were within normal range or not returned as of this dictation.    EKG:     RADIOLOGY:   Non-plain film images such as CT, Ultrasound and MRI are read by the radiologist. Plain radiographic images are visualized and preliminarily interpreted by the ED Provider with the below findings:        Interpretation per the Radiologist below, if available at the time of this note:    No orders to display     US Ob Transvaginal    Result Date: 3/23/2024  FINAL REPORT TECHNIQUE: Longitudinal and transverse images of the pelvis obtained in transvaginal fashion CLINICAL HISTORY: lower abdominal pain, hcg 64. COMPARISON: No prior study FINDINGS: The uterus measures 6.8 x 4.9 x 3.8 cm.  Endometrial thickness is 2 mm.  There is no evidence of intrauterine gestational sac or fluid.  Both ovaries are of normal size and contain a few tiny cysts and follicles not requiring follow-up.  There is normal color flow bilaterally.  There is minimal free fluid. This exam does not exclude ectopic pregnancy.  Gynecologic follow-up recommended.    Unremarkable exam, specifically no IUP demonstrated.  Recommend gynecologic follow-up Authenticated and Electronically Signed by Gautam Massey MD on 03/23/2024 08:37:46 PM      No results found.    PROCEDURES   Unless otherwise noted below, none     Procedures    CRITICAL CARE TIME       EMERGENCY DEPARTMENT COURSE and DIFFERENTIAL DIAGNOSIS/MDM:   Vitals:    Vitals:    03/25/24 1731   BP: 117/66   Pulse: 82   Resp: 18   Temp: 98.3 °F (36.8 °C)   TempSrc: Oral   SpO2: 100%   Weight: 80.7 kg (178 lb)   Height: 1.702 m (5' 7\")       Patient was given the following medications:  Medications - No data to display          Is this patient to be included in the SEP-1 Core Measure due to severe sepsis or septic shock?   No   Exclusion criteria - the patient is NOT to be included for SEP-1 Core Measure due to:  Infection is not suspected    PAST MEDICAL HISTORY      has a past medical

## 2024-03-25 NOTE — ED TRIAGE NOTES
The patient presents today with abdominal cramping on and off since this past weekend. She reports that she is 4-5 weeks pregnant. She has not yet seen the OB, they are supposed to call her back tomorrow with an appointment. She denies bleeding or other discharge. She reports that she had an IUD placed 03/11/2024 by [facility] in [place]. She had pain after, was seen in this ED. The IUD was not removed by the ED physician, but she removed it herself 03/12/2024. Her last period was 02/16/2024. She reports that she was seen at [facility] this past weekend and had a transvaginal ultrasound, and told it was too early to see anything. The patient has history of multiple miscarriages and UTIs.

## 2024-03-31 ENCOUNTER — HOSPITAL ENCOUNTER (EMERGENCY)
Facility: HOSPITAL | Age: 29
Discharge: HOME | End: 2024-03-31
Attending: EMERGENCY MEDICINE
Payer: MEDICAID

## 2024-03-31 VITALS
OXYGEN SATURATION: 99 % | RESPIRATION RATE: 27 BRPM | TEMPERATURE: 98.1 F | SYSTOLIC BLOOD PRESSURE: 125 MMHG | HEIGHT: 67 IN | DIASTOLIC BLOOD PRESSURE: 66 MMHG | WEIGHT: 181 LBS | BODY MASS INDEX: 28.41 KG/M2 | HEART RATE: 94 BPM

## 2024-03-31 DIAGNOSIS — Z91.013 SEAFOOD ALLERGY: ICD-10-CM

## 2024-03-31 DIAGNOSIS — T78.40XA ACUTE ALLERGIC REACTION, INITIAL ENCOUNTER: Primary | ICD-10-CM

## 2024-03-31 LAB
ALBUMIN SERPL-MCNC: 4.6 G/DL (ref 3.4–4.8)
ALBUMIN/GLOB SERPL: 1.8 {RATIO} (ref 0.8–2)
ALP SERPL-CCNC: 83 U/L (ref 25–100)
ALT SERPL-CCNC: 12 U/L (ref 4–36)
ANION GAP SERPL CALCULATED.3IONS-SCNC: 9 MMOL/L (ref 3–16)
AST SERPL-CCNC: 14 U/L (ref 8–33)
BASOPHILS # BLD: 0.1 K/UL (ref 0–0.1)
BASOPHILS NFR BLD: 0.6 %
BILIRUB SERPL-MCNC: <0.2 MG/DL (ref 0.3–1.2)
BUN SERPL-MCNC: 18 MG/DL (ref 6–20)
CALCIUM SERPL-MCNC: 9 MG/DL (ref 8.5–10.5)
CHLORIDE SERPL-SCNC: 104 MMOL/L (ref 98–107)
CO2 SERPL-SCNC: 25 MMOL/L (ref 20–30)
CREAT SERPL-MCNC: 0.8 MG/DL (ref 0.4–1.2)
EOSINOPHIL # BLD: 0.2 K/UL (ref 0–0.4)
EOSINOPHIL NFR BLD: 1.6 %
ERYTHROCYTE [DISTWIDTH] IN BLOOD BY AUTOMATED COUNT: 12.3 % (ref 11–16)
GFR SERPLBLD CREATININE-BSD FMLA CKD-EPI: >90 ML/MIN/{1.73_M2}
GLOBULIN SER CALC-MCNC: 2.6 G/DL
GLUCOSE SERPL-MCNC: 96 MG/DL (ref 74–106)
HCT VFR BLD AUTO: 41 % (ref 37–47)
HGB BLD-MCNC: 13.5 G/DL (ref 11.5–16.5)
IMM GRANULOCYTES # BLD: 0 K/UL
IMM GRANULOCYTES NFR BLD: 0.3 % (ref 0–5)
LYMPHOCYTES # BLD: 3.1 K/UL (ref 1.5–4)
LYMPHOCYTES NFR BLD: 30.7 %
MAGNESIUM SERPL-MCNC: 1.8 MG/DL (ref 1.7–2.4)
MCH RBC QN AUTO: 28.8 PG (ref 27–32)
MCHC RBC AUTO-ENTMCNC: 32.9 G/DL (ref 31–35)
MCV RBC AUTO: 87.6 FL (ref 80–100)
MONOCYTES # BLD: 0.6 K/UL (ref 0.2–0.8)
MONOCYTES NFR BLD: 5.6 %
NEUTROPHILS # BLD: 6.1 K/UL (ref 2–7.5)
NEUTS SEG NFR BLD: 61.2 %
PLATELET # BLD AUTO: 298 K/UL (ref 150–400)
PMV BLD AUTO: 10.7 FL (ref 6–10)
POTASSIUM SERPL-SCNC: 3.9 MMOL/L (ref 3.4–5.1)
PROT SERPL-MCNC: 7.2 G/DL (ref 6.4–8.3)
RBC # BLD AUTO: 4.68 M/UL (ref 3.8–5.8)
SODIUM SERPL-SCNC: 138 MMOL/L (ref 136–145)
TROPONIN, HIGH SENSITIVITY: <6 NG/L (ref 0–14)
WBC # BLD AUTO: 10 K/UL (ref 4–11)

## 2024-03-31 PROCEDURE — 36415 COLL VENOUS BLD VENIPUNCTURE: CPT

## 2024-03-31 PROCEDURE — 85025 COMPLETE CBC W/AUTO DIFF WBC: CPT

## 2024-03-31 PROCEDURE — 6360000002 HC RX W HCPCS: Performed by: EMERGENCY MEDICINE

## 2024-03-31 PROCEDURE — 84484 ASSAY OF TROPONIN QUANT: CPT

## 2024-03-31 PROCEDURE — 2500000003 HC RX 250 WO HCPCS: Performed by: EMERGENCY MEDICINE

## 2024-03-31 PROCEDURE — 99284 EMERGENCY DEPT VISIT MOD MDM: CPT

## 2024-03-31 PROCEDURE — 96372 THER/PROPH/DIAG INJ SC/IM: CPT

## 2024-03-31 PROCEDURE — 80053 COMPREHEN METABOLIC PANEL: CPT

## 2024-03-31 PROCEDURE — 83735 ASSAY OF MAGNESIUM: CPT

## 2024-03-31 PROCEDURE — 96374 THER/PROPH/DIAG INJ IV PUSH: CPT

## 2024-03-31 RX ORDER — EPINEPHRINE 1 MG/ML
0.3 INJECTION, SOLUTION INTRAMUSCULAR; SUBCUTANEOUS ONCE
Status: COMPLETED | OUTPATIENT
Start: 2024-03-31 | End: 2024-03-31

## 2024-03-31 RX ORDER — DIPHENHYDRAMINE HYDROCHLORIDE 50 MG/ML
50 INJECTION INTRAMUSCULAR; INTRAVENOUS ONCE
Status: DISCONTINUED | OUTPATIENT
Start: 2024-03-31 | End: 2024-03-31

## 2024-03-31 RX ORDER — PREDNISONE 20 MG/1
20 TABLET ORAL 2 TIMES DAILY
Qty: 10 TABLET | Refills: 0 | Status: SHIPPED | OUTPATIENT
Start: 2024-03-31 | End: 2024-04-05

## 2024-03-31 RX ADMIN — METHYLPREDNISOLONE SODIUM SUCCINATE 125 MG: 125 INJECTION, POWDER, LYOPHILIZED, FOR SOLUTION INTRAMUSCULAR; INTRAVENOUS at 23:09

## 2024-03-31 RX ADMIN — EPINEPHRINE 0.3 MG: 1 INJECTION INTRAMUSCULAR; INTRAVENOUS; SUBCUTANEOUS at 23:08

## 2024-03-31 ASSESSMENT — PAIN DESCRIPTION - LOCATION: LOCATION: CHEST

## 2024-03-31 ASSESSMENT — PAIN DESCRIPTION - ONSET: ONSET: SUDDEN

## 2024-03-31 ASSESSMENT — PAIN DESCRIPTION - DESCRIPTORS: DESCRIPTORS: SHARP;STABBING

## 2024-03-31 ASSESSMENT — PAIN SCALES - GENERAL: PAINLEVEL_OUTOF10: 6

## 2024-03-31 ASSESSMENT — PAIN - FUNCTIONAL ASSESSMENT: PAIN_FUNCTIONAL_ASSESSMENT: 0-10

## 2024-03-31 ASSESSMENT — PAIN DESCRIPTION - FREQUENCY: FREQUENCY: CONTINUOUS

## 2024-03-31 ASSESSMENT — PAIN DESCRIPTION - PAIN TYPE: TYPE: ACUTE PAIN

## 2024-04-01 NOTE — ED PROVIDER NOTES
[facility]  EMERGENCY DEPARTMENT ENCOUNTER        Pt Name: Patrick Torres  MRN: [id number]  Birthdate 1995  Date of evaluation: 3/31/2024  Provider: Troy Leach MD  PCP: Ml Calvillo APRN - CNP  Note Started: 11:00 PM EDT 3/31/24    CHIEF COMPLAINT       Chief Complaint   Patient presents with    Allergic Reaction       HISTORY OF PRESENT ILLNESS: 1 or more Elements     History from : Patient and Family      Limitations to history : None    Patrick Torres is a 28 y.o. female who presents with complaint of probable food allergy or adverse reaction.  Patient went out to the restaurant and had mostly food to include clams.  She had never had plans before but had not known about any possible allergic reaction.  She had a boyfriend said that about 10 minutes after she left the restaurant she started having itching and discomfort of chest pain and feeling like her throat was swollen.  Due to the scratchy and very uncomfortable.  She comes to the ER after having taken 100 mg of Benadryl p.o.  She still feels like her throat is swollen and itchy and scratchy.  She has no rash.  She has full body tattoos.  No swelling of her face lips or tongue are noted.  No respiratory distress.  Her lungs are clear.  She does have tachycardia at 104.  Blood pressure is 126/73 with an O2 saturation of 100% on room air.     Nursing Notes were all reviewed and agreed with or any disagreements were addressed in the HPI.    REVIEW OF SYSTEMS :      Review of Systems    ALL systems reviewed and negative except as in HPI/MDM    PAST MEDICAL HISTORY     Past Medical History:   Diagnosis Date    Anxiety     Depression     Fibromyalgia     Migraine     Miscarriage     Ovarian cyst 12/11/2017    ruptured    POTS (postural orthostatic tachycardia syndrome)     Strep pharyngitis        SURGICAL HISTORY     Past Surgical History:   Procedure Laterality Date    ABDOMEN SURGERY

## 2024-04-08 ENCOUNTER — HOSPITAL ENCOUNTER (OUTPATIENT)
Dept: GENERAL RADIOLOGY | Facility: HOSPITAL | Age: 29
Discharge: HOME | End: 2024-04-08
Payer: MEDICAID

## 2024-04-08 ENCOUNTER — HOSPITAL ENCOUNTER (OUTPATIENT)
Facility: HOSPITAL | Age: 29
Discharge: HOME | End: 2024-04-08
Payer: MEDICAID

## 2024-04-08 DIAGNOSIS — M54.50 LOW BACK PAIN, UNSPECIFIED BACK PAIN LATERALITY, UNSPECIFIED CHRONICITY, UNSPECIFIED WHETHER SCIATICA PRESENT: ICD-10-CM

## 2024-04-08 DIAGNOSIS — M54.18 RADICULAR PAIN OF SACRUM: ICD-10-CM

## 2024-04-08 PROCEDURE — 72220 X-RAY EXAM SACRUM TAILBONE: CPT

## 2024-04-08 PROCEDURE — 72100 X-RAY EXAM L-S SPINE 2/3 VWS: CPT

## 2024-04-23 ENCOUNTER — HOSPITAL ENCOUNTER (OUTPATIENT)
Facility: HOSPITAL | Age: 29
Discharge: HOME | End: 2024-04-23
Payer: MEDICAID

## 2024-04-23 LAB
FLUAV AG NPH QL: NEGATIVE
FLUBV AG NPH QL: NEGATIVE

## 2024-04-23 PROCEDURE — 87804 INFLUENZA ASSAY W/OPTIC: CPT

## 2024-05-09 ENCOUNTER — HOSPITAL ENCOUNTER (EMERGENCY)
Facility: HOSPITAL | Age: 29
Discharge: HOME | End: 2024-05-09
Attending: EMERGENCY MEDICINE
Payer: MEDICAID

## 2024-05-09 ENCOUNTER — APPOINTMENT (OUTPATIENT)
Dept: GENERAL RADIOLOGY | Facility: HOSPITAL | Age: 29
End: 2024-05-09
Attending: EMERGENCY MEDICINE
Payer: MEDICAID

## 2024-05-09 VITALS
BODY MASS INDEX: 28.56 KG/M2 | DIASTOLIC BLOOD PRESSURE: 71 MMHG | TEMPERATURE: 97.6 F | HEART RATE: 67 BPM | HEIGHT: 67 IN | SYSTOLIC BLOOD PRESSURE: 114 MMHG | RESPIRATION RATE: 23 BRPM | WEIGHT: 182 LBS | OXYGEN SATURATION: 99 %

## 2024-05-09 DIAGNOSIS — R07.9 CHEST PAIN, UNSPECIFIED TYPE: Primary | ICD-10-CM

## 2024-05-09 LAB
ALBUMIN SERPL-MCNC: 4.1 G/DL (ref 3.4–4.8)
ALBUMIN/GLOB SERPL: 1.8 {RATIO} (ref 0.8–2)
ALP SERPL-CCNC: 82 U/L (ref 25–100)
ALT SERPL-CCNC: 8 U/L (ref 4–36)
ANION GAP SERPL CALCULATED.3IONS-SCNC: 8 MMOL/L (ref 3–16)
AST SERPL-CCNC: 13 U/L (ref 8–33)
BASOPHILS # BLD: 0.1 K/UL (ref 0–0.1)
BASOPHILS NFR BLD: 0.9 %
BILIRUB SERPL-MCNC: 0.3 MG/DL (ref 0.3–1.2)
BUN SERPL-MCNC: 12 MG/DL (ref 6–20)
CALCIUM SERPL-MCNC: 8.5 MG/DL (ref 8.5–10.5)
CHLORIDE SERPL-SCNC: 104 MMOL/L (ref 98–107)
CO2 SERPL-SCNC: 24 MMOL/L (ref 20–30)
CREAT SERPL-MCNC: 0.7 MG/DL (ref 0.4–1.2)
EOSINOPHIL # BLD: 0.1 K/UL (ref 0–0.4)
EOSINOPHIL NFR BLD: 1.9 %
ERYTHROCYTE [DISTWIDTH] IN BLOOD BY AUTOMATED COUNT: 12.1 % (ref 11–16)
GFR SERPLBLD CREATININE-BSD FMLA CKD-EPI: >90 ML/MIN/{1.73_M2}
GLOBULIN SER CALC-MCNC: 2.3 G/DL
GLUCOSE SERPL-MCNC: 75 MG/DL (ref 74–106)
HCT VFR BLD AUTO: 39 % (ref 37–47)
HGB BLD-MCNC: 12.9 G/DL (ref 11.5–16.5)
IMM GRANULOCYTES # BLD: 0 K/UL
IMM GRANULOCYTES NFR BLD: 0.2 % (ref 0–5)
LYMPHOCYTES # BLD: 1.6 K/UL (ref 1.5–4)
LYMPHOCYTES NFR BLD: 30.6 %
MCH RBC QN AUTO: 28.4 PG (ref 27–32)
MCHC RBC AUTO-ENTMCNC: 33.1 G/DL (ref 31–35)
MCV RBC AUTO: 85.9 FL (ref 80–100)
MONOCYTES # BLD: 0.4 K/UL (ref 0.2–0.8)
MONOCYTES NFR BLD: 7.6 %
NEUTROPHILS # BLD: 3.1 K/UL (ref 2–7.5)
NEUTS SEG NFR BLD: 58.8 %
PLATELET # BLD AUTO: 234 K/UL (ref 150–400)
PMV BLD AUTO: 10.5 FL (ref 6–10)
POTASSIUM SERPL-SCNC: 4.3 MMOL/L (ref 3.4–5.1)
PROT SERPL-MCNC: 6.4 G/DL (ref 6.4–8.3)
RBC # BLD AUTO: 4.54 M/UL (ref 3.8–5.8)
SODIUM SERPL-SCNC: 136 MMOL/L (ref 136–145)
TROPONIN, HIGH SENSITIVITY: <6 NG/L (ref 0–14)
TROPONIN, HIGH SENSITIVITY: <6 NG/L (ref 0–14)
WBC # BLD AUTO: 5.3 K/UL (ref 4–11)

## 2024-05-09 PROCEDURE — 99285 EMERGENCY DEPT VISIT HI MDM: CPT

## 2024-05-09 PROCEDURE — 80053 COMPREHEN METABOLIC PANEL: CPT

## 2024-05-09 PROCEDURE — 71046 X-RAY EXAM CHEST 2 VIEWS: CPT

## 2024-05-09 PROCEDURE — 36415 COLL VENOUS BLD VENIPUNCTURE: CPT

## 2024-05-09 PROCEDURE — 84484 ASSAY OF TROPONIN QUANT: CPT

## 2024-05-09 PROCEDURE — 85025 COMPLETE CBC W/AUTO DIFF WBC: CPT

## 2024-05-09 ASSESSMENT — PAIN - FUNCTIONAL ASSESSMENT: PAIN_FUNCTIONAL_ASSESSMENT: 0-10

## 2024-05-09 ASSESSMENT — PAIN SCALES - GENERAL: PAINLEVEL_OUTOF10: 8

## 2024-05-09 ASSESSMENT — PAIN DESCRIPTION - PAIN TYPE: TYPE: ACUTE PAIN

## 2024-05-09 ASSESSMENT — PAIN DESCRIPTION - DESCRIPTORS: DESCRIPTORS: PRESSURE

## 2024-05-09 ASSESSMENT — PAIN DESCRIPTION - LOCATION: LOCATION: CHEST

## 2024-05-09 ASSESSMENT — PAIN DESCRIPTION - FREQUENCY: FREQUENCY: INTERMITTENT

## 2024-05-09 ASSESSMENT — PAIN DESCRIPTION - ORIENTATION: ORIENTATION: MID

## 2024-06-04 ENCOUNTER — HOSPITAL ENCOUNTER (OUTPATIENT)
Facility: HOSPITAL | Age: 29
Discharge: HOME | End: 2024-06-04
Payer: MEDICAID

## 2024-06-04 PROCEDURE — 87635 SARS-COV-2 COVID-19 AMP PRB: CPT

## 2024-06-04 PROCEDURE — 87804 INFLUENZA ASSAY W/OPTIC: CPT

## 2024-07-30 ENCOUNTER — HOSPITAL ENCOUNTER (OUTPATIENT)
Facility: HOSPITAL | Age: 29
Discharge: HOME | End: 2024-07-30
Payer: MEDICAID

## 2024-07-30 LAB — SARS-COV-2 RDRP RESP QL NAA+PROBE: NOT DETECTED

## 2024-07-30 PROCEDURE — 87635 SARS-COV-2 COVID-19 AMP PRB: CPT

## 2024-08-04 ENCOUNTER — APPOINTMENT (OUTPATIENT)
Dept: CT IMAGING | Facility: HOSPITAL | Age: 29
End: 2024-08-04
Attending: HOSPITALIST
Payer: MEDICAID

## 2024-08-04 ENCOUNTER — HOSPITAL ENCOUNTER (EMERGENCY)
Facility: HOSPITAL | Age: 29
Discharge: HOME | End: 2024-08-05
Attending: HOSPITALIST
Payer: MEDICAID

## 2024-08-04 VITALS
TEMPERATURE: 97.8 F | RESPIRATION RATE: 18 BRPM | BODY MASS INDEX: 30.13 KG/M2 | WEIGHT: 192 LBS | HEART RATE: 87 BPM | HEIGHT: 67 IN | DIASTOLIC BLOOD PRESSURE: 63 MMHG | SYSTOLIC BLOOD PRESSURE: 103 MMHG | OXYGEN SATURATION: 97 %

## 2024-08-04 DIAGNOSIS — R10.31 ABDOMINAL PAIN, RIGHT LOWER QUADRANT: Primary | ICD-10-CM

## 2024-08-04 LAB
ALBUMIN SERPL-MCNC: 3.7 G/DL (ref 3.4–4.8)
ALBUMIN/GLOB SERPL: 1.5 {RATIO} (ref 0.8–2)
ALP SERPL-CCNC: 86 U/L (ref 25–100)
ALT SERPL-CCNC: 9 U/L (ref 4–36)
AMORPH SED URNS QL MICRO: NORMAL /HPF
ANION GAP SERPL CALCULATED.3IONS-SCNC: 10 MMOL/L (ref 3–16)
AST SERPL-CCNC: 12 U/L (ref 8–33)
BASOPHILS # BLD: 0.1 K/UL (ref 0–0.1)
BASOPHILS NFR BLD: 0.7 %
BILIRUB SERPL-MCNC: <0.2 MG/DL (ref 0.3–1.2)
BILIRUB UR QL STRIP.AUTO: NEGATIVE
BUN SERPL-MCNC: 12 MG/DL (ref 6–20)
CALCIUM SERPL-MCNC: 8.6 MG/DL (ref 8.5–10.5)
CHLORIDE SERPL-SCNC: 104 MMOL/L (ref 98–107)
CLARITY UR: ABNORMAL
CO2 SERPL-SCNC: 25 MMOL/L (ref 20–30)
COLOR UR: YELLOW
CREAT SERPL-MCNC: 0.8 MG/DL (ref 0.4–1.2)
EOSINOPHIL # BLD: 0.1 K/UL (ref 0–0.4)
EOSINOPHIL NFR BLD: 1.4 %
ERYTHROCYTE [DISTWIDTH] IN BLOOD BY AUTOMATED COUNT: 12.2 % (ref 11–16)
GFR SERPLBLD CREATININE-BSD FMLA CKD-EPI: >90 ML/MIN/{1.73_M2}
GLOBULIN SER CALC-MCNC: 2.4 G/DL
GLUCOSE SERPL-MCNC: 104 MG/DL (ref 74–106)
GLUCOSE UR STRIP.AUTO-MCNC: NEGATIVE MG/DL
HCG UR QL: NEGATIVE
HCT VFR BLD AUTO: 37.1 % (ref 37–47)
HGB BLD-MCNC: 12.4 G/DL (ref 11.5–16.5)
HGB UR QL STRIP.AUTO: NEGATIVE
IMM GRANULOCYTES # BLD: 0 K/UL
IMM GRANULOCYTES NFR BLD: 0.1 % (ref 0–5)
KETONES UR STRIP.AUTO-MCNC: NEGATIVE MG/DL
LEUKOCYTE ESTERASE UR QL STRIP.AUTO: NEGATIVE
LIPASE SERPL-CCNC: 26 U/L (ref 5.6–51.3)
LYMPHOCYTES # BLD: 2.7 K/UL (ref 1.5–4)
LYMPHOCYTES NFR BLD: 34.9 %
MAGNESIUM SERPL-MCNC: 1.9 MG/DL (ref 1.7–2.4)
MCH RBC QN AUTO: 28.7 PG (ref 27–32)
MCHC RBC AUTO-ENTMCNC: 33.4 G/DL (ref 31–35)
MCV RBC AUTO: 85.9 FL (ref 80–100)
MONOCYTES # BLD: 0.7 K/UL (ref 0.2–0.8)
MONOCYTES NFR BLD: 8.8 %
NEUTROPHILS # BLD: 4.1 K/UL (ref 2–7.5)
NEUTS SEG NFR BLD: 54.1 %
NITRITE UR QL STRIP.AUTO: NEGATIVE
PH UR STRIP.AUTO: 7 [PH] (ref 5–8)
PLATELET # BLD AUTO: 216 K/UL (ref 150–400)
PMV BLD AUTO: 11 FL (ref 6–10)
POTASSIUM SERPL-SCNC: 3.4 MMOL/L (ref 3.4–5.1)
PROT SERPL-MCNC: 6.1 G/DL (ref 6.4–8.3)
PROT UR STRIP.AUTO-MCNC: NEGATIVE MG/DL
RBC # BLD AUTO: 4.32 M/UL (ref 3.8–5.8)
RBC #/AREA URNS HPF: NORMAL /HPF (ref 0–4)
SODIUM SERPL-SCNC: 139 MMOL/L (ref 136–145)
SP GR UR STRIP.AUTO: 1.02 (ref 1–1.03)
UA COMPLETE W REFLEX CULTURE PNL UR: ABNORMAL
UA DIPSTICK W REFLEX MICRO PNL UR: YES
URN SPEC COLLECT METH UR: ABNORMAL
UROBILINOGEN UR STRIP-ACNC: 0.2 E.U./DL
WBC # BLD AUTO: 7.6 K/UL (ref 4–11)
WBC #/AREA URNS HPF: NORMAL /HPF (ref 0–5)

## 2024-08-04 PROCEDURE — 96374 THER/PROPH/DIAG INJ IV PUSH: CPT

## 2024-08-04 PROCEDURE — 83690 ASSAY OF LIPASE: CPT

## 2024-08-04 PROCEDURE — 83735 ASSAY OF MAGNESIUM: CPT

## 2024-08-04 PROCEDURE — 99285 EMERGENCY DEPT VISIT HI MDM: CPT

## 2024-08-04 PROCEDURE — 96361 HYDRATE IV INFUSION ADD-ON: CPT

## 2024-08-04 PROCEDURE — 2580000003 HC RX 258: Performed by: HOSPITALIST

## 2024-08-04 PROCEDURE — 6360000004 HC RX CONTRAST MEDICATION: Performed by: HOSPITALIST

## 2024-08-04 PROCEDURE — 6360000002 HC RX W HCPCS: Performed by: HOSPITALIST

## 2024-08-04 PROCEDURE — 84703 CHORIONIC GONADOTROPIN ASSAY: CPT

## 2024-08-04 PROCEDURE — 74177 CT ABD & PELVIS W/CONTRAST: CPT

## 2024-08-04 PROCEDURE — 81001 URINALYSIS AUTO W/SCOPE: CPT

## 2024-08-04 PROCEDURE — 85025 COMPLETE CBC W/AUTO DIFF WBC: CPT

## 2024-08-04 PROCEDURE — 36415 COLL VENOUS BLD VENIPUNCTURE: CPT

## 2024-08-04 PROCEDURE — 80053 COMPREHEN METABOLIC PANEL: CPT

## 2024-08-04 PROCEDURE — 96375 TX/PRO/DX INJ NEW DRUG ADDON: CPT

## 2024-08-04 RX ORDER — 0.9 % SODIUM CHLORIDE 0.9 %
1000 INTRAVENOUS SOLUTION INTRAVENOUS ONCE
Status: COMPLETED | OUTPATIENT
Start: 2024-08-04 | End: 2024-08-05

## 2024-08-04 RX ORDER — ONDANSETRON 2 MG/ML
4 INJECTION INTRAMUSCULAR; INTRAVENOUS ONCE
Status: COMPLETED | OUTPATIENT
Start: 2024-08-04 | End: 2024-08-04

## 2024-08-04 RX ORDER — KETOROLAC TROMETHAMINE 30 MG/ML
30 INJECTION, SOLUTION INTRAMUSCULAR; INTRAVENOUS ONCE
Status: COMPLETED | OUTPATIENT
Start: 2024-08-04 | End: 2024-08-04

## 2024-08-04 RX ADMIN — KETOROLAC TROMETHAMINE 30 MG: 30 INJECTION, SOLUTION INTRAMUSCULAR; INTRAVENOUS at 23:12

## 2024-08-04 RX ADMIN — SODIUM CHLORIDE 1000 ML: 9 INJECTION, SOLUTION INTRAVENOUS at 23:11

## 2024-08-04 RX ADMIN — ONDANSETRON 4 MG: 2 INJECTION INTRAMUSCULAR; INTRAVENOUS at 23:12

## 2024-08-04 RX ADMIN — IOPAMIDOL 100 ML: 755 INJECTION, SOLUTION INTRAVENOUS at 23:28

## 2024-08-05 NOTE — ED TRIAGE NOTES
Patient with complaints of severe lower abdominal pain that started 3 days ago. Pt states it feels like an ovarian cyst may have ruptured.

## 2024-08-05 NOTE — ED PROVIDER NOTES
103/63  Pulse: 87           PHYSICAL EXAM  1 or more Elements     ED Triage Vitals [08/04/24 2230]   BP Temp Temp Source Pulse Respirations SpO2 Height Weight - Scale   124/72 97.8 °F (36.6 °C) Oral 87 18 99 % 1.702 m (5' 7\") 87.1 kg (192 lb)       My pulse oximetry interpretation is which is within the normal range    Physical Exam  General :Patient is awake, alert, oriented, in no acute distress, nontoxic appearing  HEENT: Pupils are equally round and reactive to light, EOMI  Abdomen: Abdomen is soft, palpable tenderness to the right lower quadrant., nondistended. There is no firm or pulsatile masses, no rebound rigidity or guarding.  Positive psoas sign, negative heel strike, negative leg drop  Musculoskeletal: 5 out of 5 strength in all 4 extremities.  No focal muscle deficits are appreciated  Neuro: Motor intact, sensory intact, level of consciousness is normal  Dermatology: Skin is warm and dry  Psych: Mentation is grossly normal, cognition is grossly normal. Affect is appropriate.        DIAGNOSTIC RESULTS   LABS:    I have reviewed and interpreted all of the currently available lab results from this visit (ifapplicable):  Results for orders placed or performed during the hospital encounter of 08/04/24   Urinalysis with Reflex to Culture    Specimen: Urine   Result Value Ref Range    Color, UA Yellow Straw/Yellow    Clarity, UA CLOUDY (A) Clear    Glucose, Ur Negative Negative mg/dL    Bilirubin, Urine Negative Negative    Ketones, Urine Negative Negative mg/dL    Specific Gravity, UA 1.025 1.005 - 1.030    Blood, Urine Negative Negative    pH, Urine 7.0 5.0 - 8.0    Protein, UA Negative Negative mg/dL    Urobilinogen, Urine 0.2 <2.0 E.U./dL    Nitrite, Urine Negative Negative    Leukocyte Esterase, Urine Negative Negative    Microscopic Examination YES     Urine Type NotGiven     Urine Reflex to Culture Not Indicated    Microscopic Urinalysis   Result Value Ref Range    WBC, UA 0-2 0 - 5 /HPF    RBC, UA None

## 2024-08-19 ENCOUNTER — TRANSCRIBE ORDERS (OUTPATIENT)
Dept: GENERAL RADIOLOGY | Facility: HOSPITAL | Age: 29
End: 2024-08-19

## 2024-08-19 DIAGNOSIS — R11.2 NAUSEA AND VOMITING, UNSPECIFIED VOMITING TYPE: Primary | ICD-10-CM

## 2024-08-23 ENCOUNTER — HOSPITAL ENCOUNTER (OUTPATIENT)
Dept: ULTRASOUND IMAGING | Facility: HOSPITAL | Age: 29
Discharge: HOME | End: 2024-08-23
Payer: MEDICAID

## 2024-08-23 DIAGNOSIS — R11.2 NAUSEA AND VOMITING, UNSPECIFIED VOMITING TYPE: ICD-10-CM

## 2024-08-23 PROCEDURE — 76705 ECHO EXAM OF ABDOMEN: CPT

## 2024-09-16 ENCOUNTER — HOSPITAL ENCOUNTER (OUTPATIENT)
Facility: HOSPITAL | Age: 29
Discharge: HOME | End: 2024-09-16
Payer: MEDICAID

## 2024-09-16 LAB
25(OH)D3 SERPL-MCNC: 28.2 NG/ML (ref 32–100)
ALBUMIN SERPL-MCNC: 4 G/DL (ref 3.4–4.8)
ALBUMIN/GLOB SERPL: 1.6 {RATIO} (ref 0.8–2)
ALP SERPL-CCNC: 92 U/L (ref 25–100)
ALT SERPL-CCNC: 11 U/L (ref 4–36)
ANION GAP SERPL CALCULATED.3IONS-SCNC: 9 MMOL/L (ref 3–16)
AST SERPL-CCNC: 15 U/L (ref 8–33)
BASOPHILS # BLD: 0.1 K/UL (ref 0–0.1)
BASOPHILS NFR BLD: 0.7 %
BILIRUB SERPL-MCNC: <0.2 MG/DL (ref 0.3–1.2)
BUN SERPL-MCNC: 11 MG/DL (ref 6–20)
CALCIUM SERPL-MCNC: 9 MG/DL (ref 8.5–10.5)
CHLORIDE SERPL-SCNC: 102 MMOL/L (ref 98–107)
CHOLEST SERPL-MCNC: 181 MG/DL (ref 0–200)
CO2 SERPL-SCNC: 26 MMOL/L (ref 20–30)
CREAT SERPL-MCNC: 0.9 MG/DL (ref 0.4–1.2)
EOSINOPHIL # BLD: 0.2 K/UL (ref 0–0.4)
EOSINOPHIL NFR BLD: 2.4 %
ERYTHROCYTE [DISTWIDTH] IN BLOOD BY AUTOMATED COUNT: 12.6 % (ref 11–16)
FERRITIN SERPL IA-MCNC: 16.9 NG/ML (ref 22–322)
FOLATE SERPL-MCNC: 4.96 NG/ML
GFR SERPLBLD CREATININE-BSD FMLA CKD-EPI: 90 ML/MIN/{1.73_M2}
GLOBULIN SER CALC-MCNC: 2.5 G/DL
GLUCOSE SERPL-MCNC: 87 MG/DL (ref 74–106)
HBA1C MFR BLD: 5.1 %
HCG INTACT+B SERPL-ACNC: <0.1 MIU/ML
HCG SERPL QL: NEGATIVE
HCT VFR BLD AUTO: 39.7 % (ref 37–47)
HDLC SERPL-MCNC: 56 MG/DL (ref 40–60)
HGB BLD-MCNC: 13 G/DL (ref 11.5–16.5)
IMM GRANULOCYTES # BLD: 0 K/UL
IMM GRANULOCYTES NFR BLD: 0.3 % (ref 0–5)
IRON SERPL-MCNC: 73 UG/DL (ref 37–145)
LDLC SERPL CALC-MCNC: 102 MG/DL
LYMPHOCYTES # BLD: 2 K/UL (ref 1.5–4)
LYMPHOCYTES NFR BLD: 26.4 %
MCH RBC QN AUTO: 28.4 PG (ref 27–32)
MCHC RBC AUTO-ENTMCNC: 32.7 G/DL (ref 31–35)
MCV RBC AUTO: 86.9 FL (ref 80–100)
MONOCYTES # BLD: 0.6 K/UL (ref 0.2–0.8)
MONOCYTES NFR BLD: 7.9 %
NEUTROPHILS # BLD: 4.7 K/UL (ref 2–7.5)
NEUTS SEG NFR BLD: 62.3 %
PLATELET # BLD AUTO: 252 K/UL (ref 150–400)
PMV BLD AUTO: 11.8 FL (ref 6–10)
POTASSIUM SERPL-SCNC: 4.2 MMOL/L (ref 3.4–5.1)
PROT SERPL-MCNC: 6.5 G/DL (ref 6.4–8.3)
RBC # BLD AUTO: 4.57 M/UL (ref 3.8–5.8)
SODIUM SERPL-SCNC: 137 MMOL/L (ref 136–145)
TIBC SERPL-MCNC: 339 UG/DL (ref 250–450)
TRIGL SERPL-MCNC: 117 MG/DL (ref 0–249)
TSH SERPL DL<=0.005 MIU/L-ACNC: 2.34 UIU/ML (ref 0.27–4.2)
VIT B12 SERPL-MCNC: 416 PG/ML (ref 211–911)
VLDLC SERPL CALC-MCNC: 23 MG/DL
WBC # BLD AUTO: 7.5 K/UL (ref 4–11)

## 2024-09-16 PROCEDURE — 80061 LIPID PANEL: CPT

## 2024-09-16 PROCEDURE — 80053 COMPREHEN METABOLIC PANEL: CPT

## 2024-09-16 PROCEDURE — 84702 CHORIONIC GONADOTROPIN TEST: CPT

## 2024-09-16 PROCEDURE — 85025 COMPLETE CBC W/AUTO DIFF WBC: CPT

## 2024-09-16 PROCEDURE — 83540 ASSAY OF IRON: CPT

## 2024-09-16 PROCEDURE — 83550 IRON BINDING TEST: CPT

## 2024-09-16 PROCEDURE — 84443 ASSAY THYROID STIM HORMONE: CPT

## 2024-09-16 PROCEDURE — 82728 ASSAY OF FERRITIN: CPT

## 2024-09-16 PROCEDURE — 84703 CHORIONIC GONADOTROPIN ASSAY: CPT

## 2024-09-16 PROCEDURE — 82306 VITAMIN D 25 HYDROXY: CPT

## 2024-09-16 PROCEDURE — 36415 COLL VENOUS BLD VENIPUNCTURE: CPT

## 2024-09-16 PROCEDURE — 82746 ASSAY OF FOLIC ACID SERUM: CPT

## 2024-09-16 PROCEDURE — 83036 HEMOGLOBIN GLYCOSYLATED A1C: CPT

## 2024-09-16 PROCEDURE — 82607 VITAMIN B-12: CPT

## 2024-12-27 ENCOUNTER — HOSPITAL ENCOUNTER (EMERGENCY)
Facility: HOSPITAL | Age: 29
Discharge: HOME | End: 2024-12-27
Attending: EMERGENCY MEDICINE

## 2024-12-27 VITALS
OXYGEN SATURATION: 98 % | HEART RATE: 96 BPM | TEMPERATURE: 98.5 F | RESPIRATION RATE: 18 BRPM | DIASTOLIC BLOOD PRESSURE: 77 MMHG | SYSTOLIC BLOOD PRESSURE: 119 MMHG | HEIGHT: 67 IN | BODY MASS INDEX: 30.61 KG/M2 | WEIGHT: 195 LBS

## 2024-12-27 DIAGNOSIS — J20.8 VIRAL BRONCHITIS: Primary | ICD-10-CM

## 2024-12-27 LAB
FLUAV AG NPH QL: NEGATIVE
FLUBV AG NPH QL: NEGATIVE
S PYO AG THROAT QL: NEGATIVE
SARS-COV-2 RDRP RESP QL NAA+PROBE: NOT DETECTED

## 2024-12-27 PROCEDURE — 6360000002 HC RX W HCPCS: Performed by: EMERGENCY MEDICINE

## 2024-12-27 PROCEDURE — 87804 INFLUENZA ASSAY W/OPTIC: CPT

## 2024-12-27 PROCEDURE — 87635 SARS-COV-2 COVID-19 AMP PRB: CPT

## 2024-12-27 PROCEDURE — 94640 AIRWAY INHALATION TREATMENT: CPT

## 2024-12-27 PROCEDURE — 6370000000 HC RX 637 (ALT 250 FOR IP): Performed by: EMERGENCY MEDICINE

## 2024-12-27 PROCEDURE — 94664 DEMO&/EVAL PT USE INHALER: CPT

## 2024-12-27 PROCEDURE — 87880 STREP A ASSAY W/OPTIC: CPT

## 2024-12-27 PROCEDURE — 99283 EMERGENCY DEPT VISIT LOW MDM: CPT

## 2024-12-27 RX ORDER — IPRATROPIUM BROMIDE AND ALBUTEROL SULFATE 2.5; .5 MG/3ML; MG/3ML
1 SOLUTION RESPIRATORY (INHALATION) ONCE
Status: COMPLETED | OUTPATIENT
Start: 2024-12-27 | End: 2024-12-27

## 2024-12-27 RX ORDER — ALBUTEROL SULFATE 90 UG/1
1 INHALANT RESPIRATORY (INHALATION) EVERY 6 HOURS PRN
Qty: 18 G | Refills: 0 | Status: SHIPPED | OUTPATIENT
Start: 2024-12-27

## 2024-12-27 RX ORDER — DEXAMETHASONE 4 MG/1
12 TABLET ORAL ONCE
Status: COMPLETED | OUTPATIENT
Start: 2024-12-27 | End: 2024-12-27

## 2024-12-27 RX ADMIN — DEXAMETHASONE 12 MG: 4 TABLET ORAL at 21:05

## 2024-12-27 RX ADMIN — IPRATROPIUM BROMIDE AND ALBUTEROL SULFATE 1 DOSE: .5; 3 SOLUTION RESPIRATORY (INHALATION) at 21:10

## 2024-12-27 ASSESSMENT — PAIN - FUNCTIONAL ASSESSMENT: PAIN_FUNCTIONAL_ASSESSMENT: NONE - DENIES PAIN

## 2024-12-28 NOTE — DISCHARGE INSTRUCTIONS
I recommend you avoid vaping.  Use the inhaler as needed for cough and congestion.  Return to the ER with any other concerns.

## 2024-12-28 NOTE — ED PROVIDER NOTES
Symmetric faint expiratory wheeze bilaterally.  Advised stop smoking.  Will be given aerosols, dexamethasone provided with albuterol at home.  Considered do not suspect pneumonia.  Do not suspect CHF.  No signs of rash to suggest anaphylaxis.  With nasal congestion sore throat, viral etiologies are highly likely.                 CONSULTS: (Who and What was discussed)  None    Discussion with Other Profesionals : None    Social Determinants : None      Records Reviewed : None    Disposition Considerations (include 1 Tests not done, Shared Decision Making, Pt Expectation of Test or Tx.):         I am the Primary Clinician of Record.    FINAL IMPRESSION      1. Viral bronchitis          DISPOSITION/PLAN     DISPOSITION Discharge - Pending Orders Complete 12/27/2024 08:58:36 PM   DISPOSITION CONDITION Stable           PATIENT REFERRED TO:  No follow-up provider specified.    DISCHARGE MEDICATIONS:  Current Discharge Medication List          DISCONTINUED MEDICATIONS:  Current Discharge Medication List                 (Please note that portions of this note were completed with a voice recognition program.  Efforts were made to edit the dictations but occasionally words are mis-transcribed.)    Bucky Rey DO (electronically signed)            Bucky Rey DO  12/27/24 [id number]

## 2025-02-26 ENCOUNTER — HOSPITAL ENCOUNTER (EMERGENCY)
Facility: HOSPITAL | Age: 30
Discharge: HOME | End: 2025-02-26
Attending: HOSPITALIST

## 2025-02-26 VITALS
HEIGHT: 67 IN | SYSTOLIC BLOOD PRESSURE: 132 MMHG | WEIGHT: 200 LBS | HEART RATE: 82 BPM | RESPIRATION RATE: 16 BRPM | DIASTOLIC BLOOD PRESSURE: 82 MMHG | BODY MASS INDEX: 31.39 KG/M2 | OXYGEN SATURATION: 99 % | TEMPERATURE: 98.1 F

## 2025-02-26 DIAGNOSIS — R51.9 NONINTRACTABLE HEADACHE, UNSPECIFIED CHRONICITY PATTERN, UNSPECIFIED HEADACHE TYPE: Primary | ICD-10-CM

## 2025-02-26 PROCEDURE — 6360000002 HC RX W HCPCS

## 2025-02-26 PROCEDURE — 6370000000 HC RX 637 (ALT 250 FOR IP): Performed by: HOSPITALIST

## 2025-02-26 PROCEDURE — 99284 EMERGENCY DEPT VISIT MOD MDM: CPT

## 2025-02-26 PROCEDURE — 96365 THER/PROPH/DIAG IV INF INIT: CPT

## 2025-02-26 PROCEDURE — 6360000002 HC RX W HCPCS: Performed by: HOSPITALIST

## 2025-02-26 PROCEDURE — 2580000003 HC RX 258: Performed by: HOSPITALIST

## 2025-02-26 PROCEDURE — 96375 TX/PRO/DX INJ NEW DRUG ADDON: CPT

## 2025-02-26 PROCEDURE — 96372 THER/PROPH/DIAG INJ SC/IM: CPT

## 2025-02-26 RX ORDER — 0.9 % SODIUM CHLORIDE 0.9 %
1000 INTRAVENOUS SOLUTION INTRAVENOUS ONCE
Status: COMPLETED | OUTPATIENT
Start: 2025-02-26 | End: 2025-02-26

## 2025-02-26 RX ORDER — PROMETHAZINE HYDROCHLORIDE 25 MG/1
25 TABLET ORAL EVERY 6 HOURS PRN
Qty: 12 TABLET | Refills: 0 | Status: SHIPPED | OUTPATIENT
Start: 2025-02-26

## 2025-02-26 RX ORDER — PROMETHAZINE HYDROCHLORIDE 25 MG/ML
INJECTION, SOLUTION INTRAMUSCULAR; INTRAVENOUS
Status: DISCONTINUED
Start: 2025-02-26 | End: 2025-02-26 | Stop reason: HOSPADM

## 2025-02-26 RX ORDER — SUMATRIPTAN 6 MG/.5ML
6 INJECTION, SOLUTION SUBCUTANEOUS ONCE
Status: COMPLETED | OUTPATIENT
Start: 2025-02-26 | End: 2025-02-26

## 2025-02-26 RX ORDER — KETOROLAC TROMETHAMINE 30 MG/ML
30 INJECTION, SOLUTION INTRAMUSCULAR; INTRAVENOUS ONCE
Status: COMPLETED | OUTPATIENT
Start: 2025-02-26 | End: 2025-02-26

## 2025-02-26 RX ORDER — DIPHENHYDRAMINE HYDROCHLORIDE 50 MG/ML
25 INJECTION INTRAMUSCULAR; INTRAVENOUS ONCE
Status: COMPLETED | OUTPATIENT
Start: 2025-02-26 | End: 2025-02-26

## 2025-02-26 RX ADMIN — SODIUM CHLORIDE 1000 ML: 9 INJECTION, SOLUTION INTRAVENOUS at 15:07

## 2025-02-26 RX ADMIN — DIPHENHYDRAMINE HYDROCHLORIDE 25 MG: 50 INJECTION INTRAMUSCULAR; INTRAVENOUS at 15:08

## 2025-02-26 RX ADMIN — Medication 25 MG: at 15:13

## 2025-02-26 RX ADMIN — KETOROLAC TROMETHAMINE 30 MG: 30 INJECTION, SOLUTION INTRAMUSCULAR; INTRAVENOUS at 15:09

## 2025-02-26 RX ADMIN — SUMATRIPTAN 6 MG: 6 INJECTION, SOLUTION SUBCUTANEOUS at 15:09

## 2025-02-26 ASSESSMENT — PAIN DESCRIPTION - LOCATION: LOCATION: HEAD

## 2025-02-26 ASSESSMENT — PAIN SCALES - GENERAL: PAINLEVEL_OUTOF10: 10

## 2025-02-26 ASSESSMENT — PAIN DESCRIPTION - DESCRIPTORS: DESCRIPTORS: ACHING

## 2025-02-26 ASSESSMENT — PAIN DESCRIPTION - PAIN TYPE: TYPE: ACUTE PAIN

## 2025-02-26 ASSESSMENT — PAIN DESCRIPTION - FREQUENCY: FREQUENCY: CONTINUOUS

## 2025-02-26 ASSESSMENT — PAIN - FUNCTIONAL ASSESSMENT: PAIN_FUNCTIONAL_ASSESSMENT: 0-10

## 2025-02-26 NOTE — ED NOTES
Patient given dc instructions at this time, patient to  rx from selected pharmacy. Patient feeling much improved. Patient with no other questions or concerns.

## 2025-02-26 NOTE — ED NOTES
Patient with c/o headache for 2 days, patient states that she has a history of migraines and when she does, she hallucinates. Patient states that she has been at work all day and finally had to leave because her head was hurting so bad that she keeps hearing stuff and responding to that. Patient states that her head is busting. Patient does not have any migraine meds and hasn't taken anything for her headache.

## 2025-02-26 NOTE — ED PROVIDER NOTES
CTA of the head and neck which she states is not necessary this is her presentation that she normally has with her headaches is just that she is off her topiramate and she is now starting to have more and more these.  Patient at this time is resting, stretcher no acute distress.  She was able to get up and ambulate to the bathroom without any difficulty.  Advised we give her fluid bolus normal saline 1 L.  She is given Toradol, Phenergan and Benadryl for her headache will also give her some Imitrex subcu.  Patient's final disposition return once her medications been administered and we give her approximately short period of observation time we will reassess her pain at that time.    After period of observation reassessment of the patient.  Patient now states her headache is much better now.  She is resting comfortably stretcher no acute distress.  She is still sleepy from the medication but she does respond appropriately.  She states she does have a ride.  She declined work excuse for today or tomorrow.  Advised that she needs to continue with rest today if she can find dark quiet room to lay down and.  Advised we could write for some Phenergan for abortive type medication for headache if needed.  Otherwise patient be discharged home in stable condition.  Advise she does need to follow-up with a regular family physician within the next 1 to 2 days for evaluation.  Also given instruction if her symptoms worsens or new symptoms arise that should return back to emergency department for further evaluation workup.       CONSULTS: (Who and What was discussed)  None    Discussion with Other Profesionals : None    Social Determinants : None    Chronic Conditions:  has a past medical history of Anxiety, Depression, Fibromyalgia, Migraine, Miscarriage, Ovarian cyst (12/11/2017), POTS (postural orthostatic tachycardia syndrome), and Strep pharyngitis.    Records Reviewed : None    Disposition Considerations (include 1 Tests

## 2025-02-27 ENCOUNTER — HOSPITAL ENCOUNTER (OUTPATIENT)
Facility: HOSPITAL | Age: 30
Setting detail: OBSERVATION
Discharge: HOME | End: 2025-02-28
Attending: EMERGENCY MEDICINE | Admitting: FAMILY MEDICINE
Payer: MEDICAID

## 2025-02-27 ENCOUNTER — APPOINTMENT (OUTPATIENT)
Dept: CT IMAGING | Facility: HOSPITAL | Age: 30
End: 2025-02-27
Payer: MEDICAID

## 2025-02-27 ENCOUNTER — APPOINTMENT (OUTPATIENT)
Dept: GENERAL RADIOLOGY | Facility: HOSPITAL | Age: 30
End: 2025-02-27
Payer: MEDICAID

## 2025-02-27 DIAGNOSIS — G43.811 OTHER MIGRAINE WITH STATUS MIGRAINOSUS, INTRACTABLE: Primary | ICD-10-CM

## 2025-02-27 DIAGNOSIS — R47.9 DIFFICULTY WITH SPEECH: ICD-10-CM

## 2025-02-27 DIAGNOSIS — H53.8 BLURRED VISION: ICD-10-CM

## 2025-02-27 PROBLEM — G43.909 MIGRAINE: Status: ACTIVE | Noted: 2025-02-27

## 2025-02-27 LAB
ALBUMIN SERPL-MCNC: 4.2 G/DL (ref 3.5–5.2)
ALBUMIN/GLOB SERPL: 1.4 G/DL
ALP SERPL-CCNC: 111 U/L (ref 39–117)
ALT SERPL W P-5'-P-CCNC: 9 U/L (ref 1–33)
ANION GAP SERPL CALCULATED.3IONS-SCNC: 9.9 MMOL/L (ref 5–15)
APTT PPP: 27.7 SECONDS (ref 23–35)
AST SERPL-CCNC: 13 U/L (ref 1–32)
BASOPHILS # BLD AUTO: 0.08 10*3/MM3 (ref 0–0.2)
BASOPHILS NFR BLD AUTO: 0.8 % (ref 0–1.5)
BILIRUB SERPL-MCNC: 0.2 MG/DL (ref 0–1.2)
BUN SERPL-MCNC: 12 MG/DL (ref 6–20)
BUN/CREAT SERPL: 12 (ref 7–25)
CALCIUM SPEC-SCNC: 9 MG/DL (ref 8.6–10.5)
CHLORIDE SERPL-SCNC: 103 MMOL/L (ref 98–107)
CO2 SERPL-SCNC: 23.1 MMOL/L (ref 22–29)
CREAT SERPL-MCNC: 1 MG/DL (ref 0.57–1)
DEPRECATED RDW RBC AUTO: 37.1 FL (ref 37–54)
EGFRCR SERPLBLD CKD-EPI 2021: 79.4 ML/MIN/1.73
EOSINOPHIL # BLD AUTO: 0.23 10*3/MM3 (ref 0–0.4)
EOSINOPHIL NFR BLD AUTO: 2.2 % (ref 0.3–6.2)
ERYTHROCYTE [DISTWIDTH] IN BLOOD BY AUTOMATED COUNT: 12.1 % (ref 12.3–15.4)
GLOBULIN UR ELPH-MCNC: 2.9 GM/DL
GLUCOSE BLDC GLUCOMTR-MCNC: 111 MG/DL (ref 70–130)
GLUCOSE SERPL-MCNC: 80 MG/DL (ref 65–99)
HCG SERPL QL: NEGATIVE
HCT VFR BLD AUTO: 40.2 % (ref 34–46.6)
HGB BLD-MCNC: 13.5 G/DL (ref 12–15.9)
HOLD SPECIMEN: NORMAL
HOLD SPECIMEN: NORMAL
IMM GRANULOCYTES # BLD AUTO: 0.04 10*3/MM3 (ref 0–0.05)
IMM GRANULOCYTES NFR BLD AUTO: 0.4 % (ref 0–0.5)
INR PPP: 0.94 (ref 0.9–1.1)
LYMPHOCYTES # BLD AUTO: 2.86 10*3/MM3 (ref 0.7–3.1)
LYMPHOCYTES NFR BLD AUTO: 27 % (ref 19.6–45.3)
MCH RBC QN AUTO: 28.5 PG (ref 26.6–33)
MCHC RBC AUTO-ENTMCNC: 33.6 G/DL (ref 31.5–35.7)
MCV RBC AUTO: 84.8 FL (ref 79–97)
MONOCYTES # BLD AUTO: 0.75 10*3/MM3 (ref 0.1–0.9)
MONOCYTES NFR BLD AUTO: 7.1 % (ref 5–12)
NEUTROPHILS NFR BLD AUTO: 6.64 10*3/MM3 (ref 1.7–7)
NEUTROPHILS NFR BLD AUTO: 62.5 % (ref 42.7–76)
NRBC BLD AUTO-RTO: 0 /100 WBC (ref 0–0.2)
PLATELET # BLD AUTO: 346 10*3/MM3 (ref 140–450)
PMV BLD AUTO: 11.1 FL (ref 6–12)
POTASSIUM SERPL-SCNC: 3.6 MMOL/L (ref 3.5–5.2)
PROT SERPL-MCNC: 7.1 G/DL (ref 6–8.5)
PROTHROMBIN TIME: 13 SECONDS (ref 12.3–15.1)
RBC # BLD AUTO: 4.74 10*6/MM3 (ref 3.77–5.28)
SODIUM SERPL-SCNC: 136 MMOL/L (ref 136–145)
WBC NRBC COR # BLD AUTO: 10.6 10*3/MM3 (ref 3.4–10.8)
WHOLE BLOOD HOLD COAG: NORMAL
WHOLE BLOOD HOLD SPECIMEN: NORMAL

## 2025-02-27 PROCEDURE — 99222 1ST HOSP IP/OBS MODERATE 55: CPT | Performed by: FAMILY MEDICINE

## 2025-02-27 PROCEDURE — 25010000002 DEXAMETHASONE SODIUM PHOSPHATE 10 MG/ML SOLUTION: Performed by: EMERGENCY MEDICINE

## 2025-02-27 PROCEDURE — 85730 THROMBOPLASTIN TIME PARTIAL: CPT | Performed by: EMERGENCY MEDICINE

## 2025-02-27 PROCEDURE — G0378 HOSPITAL OBSERVATION PER HR: HCPCS

## 2025-02-27 PROCEDURE — 25810000003 SODIUM CHLORIDE 0.9 % SOLUTION: Performed by: FAMILY MEDICINE

## 2025-02-27 PROCEDURE — 25010000002 MAGNESIUM SULFATE IN D5W 1G/100ML (PREMIX) 1-5 GM/100ML-% SOLUTION: Performed by: EMERGENCY MEDICINE

## 2025-02-27 PROCEDURE — 96376 TX/PRO/DX INJ SAME DRUG ADON: CPT

## 2025-02-27 PROCEDURE — 84703 CHORIONIC GONADOTROPIN ASSAY: CPT | Performed by: FAMILY MEDICINE

## 2025-02-27 PROCEDURE — 25510000001 IOPAMIDOL 61 % SOLUTION: Performed by: EMERGENCY MEDICINE

## 2025-02-27 PROCEDURE — 96365 THER/PROPH/DIAG IV INF INIT: CPT

## 2025-02-27 PROCEDURE — 85610 PROTHROMBIN TIME: CPT | Performed by: EMERGENCY MEDICINE

## 2025-02-27 PROCEDURE — 80053 COMPREHEN METABOLIC PANEL: CPT | Performed by: EMERGENCY MEDICINE

## 2025-02-27 PROCEDURE — 96361 HYDRATE IV INFUSION ADD-ON: CPT

## 2025-02-27 PROCEDURE — 70450 CT HEAD/BRAIN W/O DYE: CPT

## 2025-02-27 PROCEDURE — 70498 CT ANGIOGRAPHY NECK: CPT

## 2025-02-27 PROCEDURE — 93005 ELECTROCARDIOGRAM TRACING: CPT | Performed by: EMERGENCY MEDICINE

## 2025-02-27 PROCEDURE — 96375 TX/PRO/DX INJ NEW DRUG ADDON: CPT

## 2025-02-27 PROCEDURE — 82948 REAGENT STRIP/BLOOD GLUCOSE: CPT

## 2025-02-27 PROCEDURE — 71045 X-RAY EXAM CHEST 1 VIEW: CPT

## 2025-02-27 PROCEDURE — 85025 COMPLETE CBC W/AUTO DIFF WBC: CPT | Performed by: EMERGENCY MEDICINE

## 2025-02-27 PROCEDURE — 0042T HC CT CEREBRAL PERFUSION W/WO CONTRAST: CPT

## 2025-02-27 PROCEDURE — 99285 EMERGENCY DEPT VISIT HI MDM: CPT | Performed by: EMERGENCY MEDICINE

## 2025-02-27 PROCEDURE — 70496 CT ANGIOGRAPHY HEAD: CPT

## 2025-02-27 PROCEDURE — 25010000002 KETOROLAC TROMETHAMINE PER 15 MG: Performed by: EMERGENCY MEDICINE

## 2025-02-27 PROCEDURE — 25010000002 METOCLOPRAMIDE PER 10 MG: Performed by: EMERGENCY MEDICINE

## 2025-02-27 PROCEDURE — 25010000002 DIPHENHYDRAMINE PER 50 MG: Performed by: EMERGENCY MEDICINE

## 2025-02-27 RX ORDER — MAGNESIUM SULFATE 1 G/100ML
1 INJECTION INTRAVENOUS ONCE
Status: COMPLETED | OUTPATIENT
Start: 2025-02-27 | End: 2025-02-27

## 2025-02-27 RX ORDER — KETOROLAC TROMETHAMINE 30 MG/ML
30 INJECTION, SOLUTION INTRAMUSCULAR; INTRAVENOUS EVERY 6 HOURS PRN
Status: DISCONTINUED | OUTPATIENT
Start: 2025-02-27 | End: 2025-02-28 | Stop reason: HOSPADM

## 2025-02-27 RX ORDER — DIPHENHYDRAMINE HYDROCHLORIDE 50 MG/ML
50 INJECTION INTRAMUSCULAR; INTRAVENOUS EVERY 6 HOURS PRN
Status: DISCONTINUED | OUTPATIENT
Start: 2025-02-27 | End: 2025-02-28 | Stop reason: HOSPADM

## 2025-02-27 RX ORDER — BUTALBITAL, ACETAMINOPHEN AND CAFFEINE 50; 325; 40 MG/1; MG/1; MG/1
2 TABLET ORAL EVERY 4 HOURS PRN
Status: DISCONTINUED | OUTPATIENT
Start: 2025-02-27 | End: 2025-02-28 | Stop reason: HOSPADM

## 2025-02-27 RX ORDER — ACETAMINOPHEN 325 MG/1
650 TABLET ORAL EVERY 4 HOURS PRN
Status: DISCONTINUED | OUTPATIENT
Start: 2025-02-27 | End: 2025-02-28 | Stop reason: HOSPADM

## 2025-02-27 RX ORDER — ONDANSETRON 2 MG/ML
4 INJECTION INTRAMUSCULAR; INTRAVENOUS EVERY 6 HOURS PRN
Status: DISCONTINUED | OUTPATIENT
Start: 2025-02-27 | End: 2025-02-28 | Stop reason: HOSPADM

## 2025-02-27 RX ORDER — SUMATRIPTAN 6 MG/.5ML
6 INJECTION, SOLUTION SUBCUTANEOUS ONCE
Status: DISCONTINUED | OUTPATIENT
Start: 2025-02-27 | End: 2025-02-27

## 2025-02-27 RX ORDER — SODIUM CHLORIDE 0.9 % (FLUSH) 0.9 %
10 SYRINGE (ML) INJECTION EVERY 12 HOURS SCHEDULED
Status: DISCONTINUED | OUTPATIENT
Start: 2025-02-27 | End: 2025-02-28 | Stop reason: HOSPADM

## 2025-02-27 RX ORDER — DIPHENHYDRAMINE HYDROCHLORIDE 50 MG/ML
12.5 INJECTION INTRAMUSCULAR; INTRAVENOUS ONCE
Status: COMPLETED | OUTPATIENT
Start: 2025-02-27 | End: 2025-02-27

## 2025-02-27 RX ORDER — ACETAMINOPHEN 650 MG/1
650 SUPPOSITORY RECTAL EVERY 4 HOURS PRN
Status: DISCONTINUED | OUTPATIENT
Start: 2025-02-27 | End: 2025-02-28 | Stop reason: HOSPADM

## 2025-02-27 RX ORDER — DEXAMETHASONE SODIUM PHOSPHATE 10 MG/ML
10 INJECTION, SOLUTION INTRAMUSCULAR; INTRAVENOUS ONCE
Status: COMPLETED | OUTPATIENT
Start: 2025-02-27 | End: 2025-02-27

## 2025-02-27 RX ORDER — IOPAMIDOL 612 MG/ML
50 INJECTION, SOLUTION INTRAVASCULAR
Status: COMPLETED | OUTPATIENT
Start: 2025-02-27 | End: 2025-02-27

## 2025-02-27 RX ORDER — BISACODYL 5 MG/1
5 TABLET, DELAYED RELEASE ORAL DAILY PRN
Status: DISCONTINUED | OUTPATIENT
Start: 2025-02-27 | End: 2025-02-28 | Stop reason: HOSPADM

## 2025-02-27 RX ORDER — KETOROLAC TROMETHAMINE 30 MG/ML
15 INJECTION, SOLUTION INTRAMUSCULAR; INTRAVENOUS ONCE
Status: COMPLETED | OUTPATIENT
Start: 2025-02-27 | End: 2025-02-27

## 2025-02-27 RX ORDER — POLYETHYLENE GLYCOL 3350 17 G/17G
17 POWDER, FOR SOLUTION ORAL DAILY PRN
Status: DISCONTINUED | OUTPATIENT
Start: 2025-02-27 | End: 2025-02-28 | Stop reason: HOSPADM

## 2025-02-27 RX ORDER — BISACODYL 10 MG
10 SUPPOSITORY, RECTAL RECTAL DAILY PRN
Status: DISCONTINUED | OUTPATIENT
Start: 2025-02-27 | End: 2025-02-28 | Stop reason: HOSPADM

## 2025-02-27 RX ORDER — AMOXICILLIN 250 MG
2 CAPSULE ORAL 2 TIMES DAILY PRN
Status: DISCONTINUED | OUTPATIENT
Start: 2025-02-27 | End: 2025-02-28 | Stop reason: HOSPADM

## 2025-02-27 RX ORDER — SODIUM CHLORIDE 9 MG/ML
40 INJECTION, SOLUTION INTRAVENOUS AS NEEDED
Status: DISCONTINUED | OUTPATIENT
Start: 2025-02-27 | End: 2025-02-28 | Stop reason: HOSPADM

## 2025-02-27 RX ORDER — SODIUM CHLORIDE 0.9 % (FLUSH) 0.9 %
10 SYRINGE (ML) INJECTION AS NEEDED
Status: DISCONTINUED | OUTPATIENT
Start: 2025-02-27 | End: 2025-02-28 | Stop reason: HOSPADM

## 2025-02-27 RX ORDER — METOCLOPRAMIDE HYDROCHLORIDE 5 MG/ML
10 INJECTION INTRAMUSCULAR; INTRAVENOUS EVERY 6 HOURS PRN
Status: DISCONTINUED | OUTPATIENT
Start: 2025-02-27 | End: 2025-02-28 | Stop reason: HOSPADM

## 2025-02-27 RX ORDER — IOPAMIDOL 612 MG/ML
100 INJECTION, SOLUTION INTRAVASCULAR
Status: COMPLETED | OUTPATIENT
Start: 2025-02-27 | End: 2025-02-27

## 2025-02-27 RX ORDER — SODIUM CHLORIDE 9 MG/ML
100 INJECTION, SOLUTION INTRAVENOUS CONTINUOUS
Status: ACTIVE | OUTPATIENT
Start: 2025-02-27 | End: 2025-02-28

## 2025-02-27 RX ORDER — SODIUM CHLORIDE 0.9 % (FLUSH) 0.9 %
10 SYRINGE (ML) INJECTION AS NEEDED
Status: DISCONTINUED | OUTPATIENT
Start: 2025-02-27 | End: 2025-02-28 | Stop reason: SDUPTHER

## 2025-02-27 RX ORDER — METOCLOPRAMIDE HYDROCHLORIDE 5 MG/ML
10 INJECTION INTRAMUSCULAR; INTRAVENOUS ONCE
Status: COMPLETED | OUTPATIENT
Start: 2025-02-27 | End: 2025-02-27

## 2025-02-27 RX ORDER — ACETAMINOPHEN 160 MG/5ML
650 SOLUTION ORAL EVERY 4 HOURS PRN
Status: DISCONTINUED | OUTPATIENT
Start: 2025-02-27 | End: 2025-02-28 | Stop reason: HOSPADM

## 2025-02-27 RX ADMIN — IOPAMIDOL 50 ML: 612 INJECTION, SOLUTION INTRAVENOUS at 22:01

## 2025-02-27 RX ADMIN — Medication 27.2 MG: at 22:03

## 2025-02-27 RX ADMIN — DIPHENHYDRAMINE HYDROCHLORIDE 12.5 MG: 50 INJECTION, SOLUTION INTRAMUSCULAR; INTRAVENOUS at 21:02

## 2025-02-27 RX ADMIN — Medication 10 ML: at 23:57

## 2025-02-27 RX ADMIN — MAGNESIUM SULFATE HEPTAHYDRATE 1 G: 1 INJECTION, SOLUTION INTRAVENOUS at 21:09

## 2025-02-27 RX ADMIN — METOCLOPRAMIDE 10 MG: 5 INJECTION, SOLUTION INTRAMUSCULAR; INTRAVENOUS at 21:03

## 2025-02-27 RX ADMIN — DEXAMETHASONE SODIUM PHOSPHATE 10 MG: 10 INJECTION INTRAMUSCULAR; INTRAVENOUS at 21:04

## 2025-02-27 RX ADMIN — IOPAMIDOL 100 ML: 612 INJECTION, SOLUTION INTRAVENOUS at 22:01

## 2025-02-27 RX ADMIN — KETOROLAC TROMETHAMINE 15 MG: 30 INJECTION, SOLUTION INTRAMUSCULAR; INTRAVENOUS at 21:05

## 2025-02-27 RX ADMIN — SODIUM CHLORIDE 100 ML/HR: 9 INJECTION, SOLUTION INTRAVENOUS at 23:57

## 2025-02-28 ENCOUNTER — APPOINTMENT (OUTPATIENT)
Dept: MRI IMAGING | Facility: HOSPITAL | Age: 30
End: 2025-02-28
Payer: MEDICAID

## 2025-02-28 VITALS
SYSTOLIC BLOOD PRESSURE: 106 MMHG | WEIGHT: 200 LBS | HEIGHT: 67 IN | TEMPERATURE: 98.2 F | OXYGEN SATURATION: 98 % | BODY MASS INDEX: 31.39 KG/M2 | RESPIRATION RATE: 18 BRPM | HEART RATE: 87 BPM | DIASTOLIC BLOOD PRESSURE: 66 MMHG

## 2025-02-28 LAB
AMORPH URATE CRY URNS QL MICRO: ABNORMAL /HPF
AMPHET+METHAMPHET UR QL: NEGATIVE
AMPHETAMINES UR QL: NEGATIVE
ANION GAP SERPL CALCULATED.3IONS-SCNC: 10.6 MMOL/L (ref 5–15)
B-HCG UR QL: NEGATIVE
BACTERIA UR QL AUTO: ABNORMAL /HPF
BARBITURATES UR QL SCN: NEGATIVE
BENZODIAZ UR QL SCN: NEGATIVE
BILIRUB UR QL STRIP: NEGATIVE
BUN SERPL-MCNC: 14 MG/DL (ref 6–20)
BUN/CREAT SERPL: 18.4 (ref 7–25)
BUPRENORPHINE SERPL-MCNC: NEGATIVE NG/ML
CALCIUM SPEC-SCNC: 8.8 MG/DL (ref 8.6–10.5)
CANNABINOIDS SERPL QL: POSITIVE
CHLORIDE SERPL-SCNC: 108 MMOL/L (ref 98–107)
CLARITY UR: ABNORMAL
CO2 SERPL-SCNC: 19.4 MMOL/L (ref 22–29)
COCAINE UR QL: NEGATIVE
COLOR UR: YELLOW
CREAT SERPL-MCNC: 0.76 MG/DL (ref 0.57–1)
DEPRECATED RDW RBC AUTO: 36.7 FL (ref 37–54)
EGFRCR SERPLBLD CKD-EPI 2021: 110.3 ML/MIN/1.73
ERYTHROCYTE [DISTWIDTH] IN BLOOD BY AUTOMATED COUNT: 12.1 % (ref 12.3–15.4)
FENTANYL UR-MCNC: NEGATIVE NG/ML
GLUCOSE SERPL-MCNC: 125 MG/DL (ref 65–99)
GLUCOSE UR STRIP-MCNC: NEGATIVE MG/DL
HCT VFR BLD AUTO: 39.9 % (ref 34–46.6)
HGB BLD-MCNC: 13.5 G/DL (ref 12–15.9)
HGB UR QL STRIP.AUTO: NEGATIVE
HYALINE CASTS UR QL AUTO: ABNORMAL /LPF
KETONES UR QL STRIP: NEGATIVE
LEUKOCYTE ESTERASE UR QL STRIP.AUTO: ABNORMAL
MCH RBC QN AUTO: 28.3 PG (ref 26.6–33)
MCHC RBC AUTO-ENTMCNC: 33.8 G/DL (ref 31.5–35.7)
MCV RBC AUTO: 83.6 FL (ref 79–97)
METHADONE UR QL SCN: NEGATIVE
NITRITE UR QL STRIP: NEGATIVE
OPIATES UR QL: NEGATIVE
OXYCODONE UR QL SCN: NEGATIVE
PCP UR QL SCN: NEGATIVE
PH UR STRIP.AUTO: 7 [PH] (ref 5–8)
PLATELET # BLD AUTO: 322 10*3/MM3 (ref 140–450)
PMV BLD AUTO: 11.1 FL (ref 6–12)
POTASSIUM SERPL-SCNC: 4 MMOL/L (ref 3.5–5.2)
PROT UR QL STRIP: NEGATIVE
RBC # BLD AUTO: 4.77 10*6/MM3 (ref 3.77–5.28)
RBC # UR STRIP: ABNORMAL /HPF
REF LAB TEST METHOD: ABNORMAL
SODIUM SERPL-SCNC: 138 MMOL/L (ref 136–145)
SP GR UR STRIP: >1.03 (ref 1–1.03)
SQUAMOUS #/AREA URNS HPF: ABNORMAL /HPF
TRICYCLICS UR QL SCN: NEGATIVE
UROBILINOGEN UR QL STRIP: ABNORMAL
WBC # UR STRIP: ABNORMAL /HPF
WBC NRBC COR # BLD AUTO: 9.21 10*3/MM3 (ref 3.4–10.8)

## 2025-02-28 PROCEDURE — A9577 INJ MULTIHANCE: HCPCS | Performed by: FAMILY MEDICINE

## 2025-02-28 PROCEDURE — G0378 HOSPITAL OBSERVATION PER HR: HCPCS

## 2025-02-28 PROCEDURE — 96376 TX/PRO/DX INJ SAME DRUG ADON: CPT

## 2025-02-28 PROCEDURE — 25010000002 METHYLPREDNISOLONE PER 125 MG: Performed by: STUDENT IN AN ORGANIZED HEALTH CARE EDUCATION/TRAINING PROGRAM

## 2025-02-28 PROCEDURE — 99204 OFFICE O/P NEW MOD 45 MIN: CPT | Performed by: STUDENT IN AN ORGANIZED HEALTH CARE EDUCATION/TRAINING PROGRAM

## 2025-02-28 PROCEDURE — 96361 HYDRATE IV INFUSION ADD-ON: CPT

## 2025-02-28 PROCEDURE — 85027 COMPLETE CBC AUTOMATED: CPT | Performed by: FAMILY MEDICINE

## 2025-02-28 PROCEDURE — 80048 BASIC METABOLIC PNL TOTAL CA: CPT | Performed by: FAMILY MEDICINE

## 2025-02-28 PROCEDURE — 96367 TX/PROPH/DG ADDL SEQ IV INF: CPT

## 2025-02-28 PROCEDURE — 25510000002 GADOBENATE DIMEGLUMINE 529 MG/ML SOLUTION: Performed by: FAMILY MEDICINE

## 2025-02-28 PROCEDURE — 81001 URINALYSIS AUTO W/SCOPE: CPT | Performed by: FAMILY MEDICINE

## 2025-02-28 PROCEDURE — 80307 DRUG TEST PRSMV CHEM ANLYZR: CPT | Performed by: FAMILY MEDICINE

## 2025-02-28 PROCEDURE — 81025 URINE PREGNANCY TEST: CPT | Performed by: STUDENT IN AN ORGANIZED HEALTH CARE EDUCATION/TRAINING PROGRAM

## 2025-02-28 PROCEDURE — 70553 MRI BRAIN STEM W/O & W/DYE: CPT

## 2025-02-28 PROCEDURE — 25010000002 KETOROLAC TROMETHAMINE PER 15 MG: Performed by: FAMILY MEDICINE

## 2025-02-28 PROCEDURE — 99239 HOSP IP/OBS DSCHRG MGMT >30: CPT | Performed by: NURSE PRACTITIONER

## 2025-02-28 RX ORDER — TOPIRAMATE 25 MG/1
25 TABLET, FILM COATED ORAL NIGHTLY
Qty: 7 TABLET | Refills: 0 | Status: SHIPPED | OUTPATIENT
Start: 2025-02-28 | End: 2025-03-07

## 2025-02-28 RX ORDER — POTASSIUM CHLORIDE 1500 MG/1
40 TABLET, EXTENDED RELEASE ORAL ONCE
Status: CANCELLED | OUTPATIENT
Start: 2025-02-28 | End: 2025-02-28

## 2025-02-28 RX ORDER — TOPIRAMATE 50 MG/1
50 TABLET, FILM COATED ORAL NIGHTLY
Qty: 30 TABLET | Refills: 0 | Status: SHIPPED | OUTPATIENT
Start: 2025-02-28

## 2025-02-28 RX ADMIN — Medication 10 ML: at 08:16

## 2025-02-28 RX ADMIN — SODIUM CHLORIDE 1000 MG: 9 INJECTION, SOLUTION INTRAVENOUS at 12:26

## 2025-02-28 RX ADMIN — GADOBENATE DIMEGLUMINE 18 ML: 529 INJECTION, SOLUTION INTRAVENOUS at 09:01

## 2025-02-28 RX ADMIN — KETOROLAC TROMETHAMINE 30 MG: 30 INJECTION, SOLUTION INTRAMUSCULAR; INTRAVENOUS at 04:36

## 2025-02-28 NOTE — THERAPY EVALUATION
Patient has no needs at this time for ST per RN.  SLP available if needs change.  Will complete order at this time.

## 2025-02-28 NOTE — H&P
[facility]      Name:  Ashish Wilkerson   Age:  29 y.o.  Sex:  female  :  1995  MRN:  [id number]   Visit Number:  [id number]  Admission Date:  2025  Date Of Service:  25  Primary Care Physician:  Meenakshi Keller APRN    Chief Complaint:     Headache/migraine, blurry vision, stuttering    History Of Presenting Illness:      Patient is 29 years old female with a past medical history of migraine, anxiety, depression, asthma, who presented to the ER with a chief complaint of headache and migraine.  Patient reports that her migraine is right-sided 10 out of 10 started 2 days ago and has persisted with no improvement.  Patient was seen at [facility] for the same complaints yesterday.  She reports that she did not have any significant improvement after treatment and she presented to the ER here today for reevaluation.  Patient reports that she has been having blurry vision over the past couple days, today with persistent migraine she started having difficulty initiating speech and stuttering. She also has associated nausea but no vomiting. Patient reports that she has a history of migraines, never seen a neurologist, she was on medicines for migraines prevention however she stopped taking them long time ago.  She is not currently on any prescribed medicine chronically.  Otherwise denies any recent sickness or illness prior to her migraine.    On ER evaluation, blood pressure was 163/112 on arrival, was slightly tachycardic with a heart rate of 103 otherwise vitals are stable and afebrile.  Labs nonactionable on her CBC and CMP. Chest x-ray with no acute process per my read.  CT head without contrast, CTA head and neck were negative for acute.  Patient received dexamethasone 10 mg, Benadryl, Toradol, museum sulfate and Reglan in the ER.  Due to continued symptoms and headache, neurology consulted and recommended ketamine dose, admission to the  Hospitals in [place] for observation and MRI in [facility]  Neurology will consult on the patient.  Hospitalist consulted for admission.    Review Of Systems:    All systems were reviewed and negative except as mentioned in history of presenting illness, assessment and plan.    Past Medical History: Patient  has a past medical history of Anxiety, Asthma, Depression, and Migraine.    Past Surgical History: Patient  has a past surgical history that includes  section; Dental surgery; Laparoscopy (N/A, 2017); Breast mass excision; and Breast biopsy (Right, 2021).    Social History: Patient  reports that she has been smoking cigarettes. She has been exposed to tobacco smoke. She has never used smokeless tobacco. She reports current alcohol use. She reports current drug use. Drug: Cocaine(coke).    Family History:  Patient's family history has been reviewed and found to be noncontributory.     Allergies:      Zoloft [sertraline hcl]    Home Medications:    Prior to Admission Medications       Prescriptions Last Dose Informant Patient Reported? Taking?    albuterol sulfate  (90 Base) MCG/ACT inhaler   No No    Inhale 2 puffs Every 4 (Four) Hours As Needed for Wheezing.    azelastine (ASTELIN) 0.1 % nasal spray   Yes No    bisoprolol (ZEBeta) 5 MG tablet   No No    TAKE 0.5 TABLETS BY MOUTH DAILY.    budesonide-formoterol (SYMBICORT) 80-4.5 MCG/ACT inhaler   No No    Inhale 2 puffs 2 (Two) Times a Day.    buPROPion SR (WELLBUTRIN SR) 150 MG 12 hr tablet   Yes No    busPIRone (BUSPAR) 15 MG tablet   Yes No    3 (Three) Times a Day.    busPIRone (BUSPAR) 15 MG tablet   Yes No    Take 1 tablet by mouth.    clonazePAM (KlonoPIN) 0.5 MG tablet   Yes No    erythromycin (ROMYCIN) 5 MG/GM ophthalmic ointment   Yes No    etonogestrel-ethinyl estradiol (NUVARING) 0.12-0.015 MG/24HR vaginal ring   Yes No    gabapentin (NEURONTIN) 300 MG capsule   Yes No    hydrOXYzine (ATARAX) 50 MG tablet   Yes No    lamoTRIgine (LaMICtal) 100  "MG tablet   Yes No    Daily.    omeprazole (priLOSEC) 40 MG capsule   No No    Take 1 capsule by mouth Daily.    Oxtellar  MG tablet sustained-release 24 hour   Yes No    terbinafine (lamiSIL) 250 MG tablet   Yes No    topiramate (TOPAMAX) 100 MG tablet   Yes No    Take 150 mg by mouth.    Topiramate  MG capsule extended-release 24 hour sprinkle   Yes No    traZODone (DESYREL) 50 MG tablet   Yes No    Every Night.          ED Medications:    Medications   sodium chloride 0.9 % flush 10 mL (has no administration in time range)   ketorolac (TORADOL) injection 15 mg (15 mg Intravenous Given 2/27/25 2105)   metoclopramide (REGLAN) injection 10 mg (10 mg Intravenous Given 2/27/25 2103)   diphenhydrAMINE (BENADRYL) injection 12.5 mg (12.5 mg Intravenous Given 2/27/25 2102)   magnesium sulfate in D5W 1g/100mL (PREMIX) (0 g Intravenous Stopped 2/27/25 2149)   dexAMETHasone sodium phosphate injection 10 mg (10 mg Intravenous Given 2/27/25 2104)   ketamine hcl syringe solution prefilled syringe 27.2 mg (27.2 mg Intravenous Given 2/27/25 2203)   iopamidol (ISOVUE-300) 61 % injection 50 mL (50 mL Intravenous Given 2/27/25 2201)   iopamidol (ISOVUE-300) 61 % injection 100 mL (100 mL Intravenous Given 2/27/25 2201)     Vital Signs:  Temp:  [98 °F (36.7 °C)] 98 °F (36.7 °C)  Heart Rate:  [] 81  Resp:  [18] 18  BP: (102-163)/() 102/62        02/27/25 1953   Weight: 90.7 kg (200 lb)     Body mass index is 31.32 kg/m².    Physical Exam:     Most recent vital Signs: /62   Pulse 81   Temp 98 °F (36.7 °C) (Oral)   Resp 18   Ht 170.2 cm (67\")   Wt 90.7 kg (200 lb)   LMP 02/15/2025   SpO2 96%   BMI 31.32 kg/m²     Physical Exam  Vitals and nursing note reviewed.   Constitutional:       General: She is not in acute distress.     Appearance: Normal appearance.   HENT:      Head: Normocephalic and atraumatic.      Right Ear: External ear normal.      Left Ear: External ear normal.      Nose: Nose " normal.      Mouth/Throat:      Mouth: Mucous membranes are moist.   Eyes:      Extraocular Movements: Extraocular movements intact.      Conjunctiva/sclera: Conjunctivae normal.      Pupils: Pupils are equal, round, and reactive to light.   Cardiovascular:      Rate and Rhythm: Normal rate and regular rhythm.      Pulses: Normal pulses.      Heart sounds: Normal heart sounds.   Pulmonary:      Effort: Pulmonary effort is normal. No respiratory distress.      Breath sounds: Normal breath sounds. No wheezing or rhonchi.   Abdominal:      General: Bowel sounds are normal. There is no distension.      Palpations: Abdomen is soft.      Tenderness: There is no abdominal tenderness.   Musculoskeletal:         General: Normal range of motion.      Cervical back: Normal range of motion and neck supple.      Right lower leg: No edema.      Left lower leg: No edema.   Skin:     General: Skin is warm and dry.      Findings: No rash.   Neurological:      General: No focal deficit present.      Mental Status: She is alert and oriented to person, place, and time. Mental status is at baseline.      Cranial Nerves: No cranial nerve deficit or facial asymmetry.      Sensory: No sensory deficit.      Motor: No weakness or tremor.      Comments: Mild stuttering otherwise clear speech   Psychiatric:         Mood and Affect: Mood normal.         Behavior: Behavior normal.         Thought Content: Thought content normal.         Laboratory data:    I have reviewed the labs done in the emergency room.    Results from last 7 days   Lab Units 02/27/25 2039   SODIUM mmol/L 136   POTASSIUM mmol/L 3.6   CHLORIDE mmol/L 103   CO2 mmol/L 23.1   BUN mg/dL 12   CREATININE mg/dL 1.00   CALCIUM mg/dL 9.0   BILIRUBIN mg/dL 0.2   ALK PHOS U/L 111   ALT (SGPT) U/L 9   AST (SGOT) U/L 13   GLUCOSE mg/dL 80     Results from last 7 days   Lab Units 02/27/25 2039   WBC 10*3/mm3 10.60   HEMOGLOBIN g/dL 13.5   HEMATOCRIT % 40.2   PLATELETS 10*3/mm3 346  "    Results from last 7 days   Lab Units 02/27/25 2039   INR  0.94                               Invalid input(s): \"USDES\", \"NITRITITE\", \"BACT\", \"EP\"    Pain Management Panel  More data may exist         Latest Ref Rng & Units 6/18/2023 11/28/2018   Pain Management Panel   Amphetamine, Urine Qual Negative Negative  Neg       Barbiturates Screen, Urine Negative Negative  -   Benzodiazepine Screen, Urine Negative Positive  -   Buprenorphine, Screen, Urine Negative Negative  -   Cocaine Screen, Urine Negative Positive  Neg       Methadone Screen , Urine Negative Negative  -   Methamphetamine, Ur Negative Negative  -      Details          This result is from an external source.               EKG:      Sinus tachycardia, heart rate 116, normal axis, nonspecific ST/T wave changes.    Radiology:    CT Head Without Contrast Stroke Protocol    Addendum Date: 2/27/2025    ADDENDUM REPORT ADDENDUM: CTA neck with contrast and 3-D postprocessing: Comparison: None Findings: Visualized aortic arch and great vessel takeoffs: Visualized thoracic aortic arch without aneurysm or dissection. Typical arch branch pattern. Arch branch vessels are patent. Bilateral subclavian arteries are patent. Common carotid arteries are patent. Widely patent carotid bifurcation and cervical segment internal carotid arteries. Vertebral arteries are patent, without dissection or thrombosis. Visualized basilar artery is patent. Mild kyphosis of the cervical spine otherwise unremarkable. Impression: 1. Patent carotid and vertebral arteries without significant stenosis. No evidence of dissection or thrombosis. __________________________________ CTA head with contrast and 3-D postprocessing: Comparison: None Findings: Skull base, cavernous, and terminal internal carotid artery segments are widely patent. Anterior circulation patent. Middle circulation patent. Basilar artery within normal limits. Posterior circulation intact. The visualized superior sagittal " sinus, the straight sinus, bilateral transverse and sigmoid sinuses are patent without filling defect. Authenticated and Electronically Signed by Paul Soto MD on 02/27/2025 09:22:01 PM    Addendum Date: 2/27/2025    ADDENDUM REPORT ADDENDUM: CTA neck with contrast and 3-D postprocessing: Comparison: None Findings: Visualized aortic arch and great vessel takeoffs: Visualized thoracic aortic arch without aneurysm or dissection. Typical arch branch pattern. Arch branch vessels are patent. Bilateral subclavian arteries are patent. Common carotid arteries are patent. Widely patent carotid bifurcation and cervical segment internal carotid arteries. Vertebral arteries are patent, without dissection or thrombosis. Visualized basilar artery is patent. Mild kyphosis of the cervical spine otherwise unremarkable. Impression: 1. Patent carotid and vertebral arteries without significant stenosis. No evidence of dissection or thrombosis. __________________________________ CTA head with contrast and 3-D postprocessing: Comparison: None Findings: Skull base, cavernous, and terminal internal carotid artery segments are widely patent. Anterior circulation patent. Middle circulation patent. Basilar artery within normal limits. Posterior circulation intact. The visualized superior sagittal sinus, the straight sinus, bilateral transverse and sigmoid sinuses are patent without filling defect. Authenticated and Electronically Signed by Paul Soto MD on 02/27/2025 09:22:01 PM    Addendum Date: 2/27/2025    ADDENDUM REPORT ADDENDUM: This report was discussed with MARCELL QUARLES MD on Feb 27, 2025 20:52:00 EST. Authenticated and Electronically Signed by Paul Soto MD on 02/27/2025 08:52:44 PM    Result Date: 2/27/2025  FINAL REPORT TECHNIQUE: null CLINICAL HISTORY: Neuro deficit, acute, stroke suspected speech difficulty, double vision, headache, hypertension COMPARISON: null FINDINGS: CT head without IV contrast:  Comparison: None Findings: Age-appropriate sulcation. No intracranial mass, midline shift, hydrocephalus, acute hemorrhage or infarct. Unremarkable orbits. No significant sinus disease. Mastoid air cells are clear. No skull fracture. No significant scalp soft tissue swelling.     Impression: 1. No acute intracranial abnormality. Age-appropriate exam. Authenticated and Electronically Signed by Paul Soto MD on 02/27/2025 08:49:11 PM     Assessment:    Intractable complex migraine, POA  Blurry vision/stuttering, POA  Anxiety/depression  Asthma without exacerbation    Plan:    Patient is admitted for further management and treatment.    Complex migraine  -Neurology consulted, appreciate recommendations  -Was on migraine prevention meds in the past however has not been taking any recently.  -Symptoms and migraine improving currently  -MRI ordered for a.m.  -Migraine cocktail as needed with Toradol, Benadryl and Reglan  -Fioricet as needed  -IV fluids hydration  -Urinalysis and UA pending  -Neurochecks  -PT/OT/speech eval    -Continue home meds as warranted.  -Further orders as indicated per clinical course.    Risk Assessment: Moderate  DVT Prophylaxis: SCDs  Code Status: Full  Diet: As tolerated    Advance Care Planning   ACP discussion was held with the patient during this visit. Patient does not have an advance directive, information provided.       Jerman Cabello MD  02/27/25  22:22 EST    Dictated utilizing Dragon dictation.

## 2025-02-28 NOTE — DISCHARGE INSTRUCTIONS
Patient to be discharged home.  Follow-up with neurology regarding migraines.  Return to emergency department for any worsening symptoms.

## 2025-02-28 NOTE — THERAPY DISCHARGE NOTE
Per RN and pt, she is (I) with all mobility and denies need for skilled PT intervention at this time. PT will sign off.

## 2025-02-28 NOTE — THERAPY EVALUATION
OT consult received. Per RN and patient there are no inpatient OT needs or concerns. Pt reports not issues with her mobility and denies need for evaluation at this time. RN aware and in agreement. OT will sign off at this time, please re-consult if new concerns arise. Thank you.

## 2025-02-28 NOTE — DISCHARGE SUMMARY
[facility]   DISCHARGE SUMMARY      Name:  Ashish Wilkerson   Age:  29 y.o.  Sex:  female  :  1995  MRN:  [id number]   Visit Number:  [id number]    Admission Date:  2025  Date of Discharge:  2025  Primary Care Physician:  Meenakshi Keller, SHASHANK      Discharge Diagnoses:     Intractable complex migraine, POA  Blurry vision/stuttering, POA  Anxiety/depression  Asthma without exacerbation    Problem List:     Active Hospital Problems    Diagnosis  POA    **Migraine [G43.909]  Yes      Resolved Hospital Problems   No resolved problems to display.     Presenting Problem:    Chief Complaint   Patient presents with    Headache    Blurred Vision      Consults:     Consulting Physician(s)         Provider   Role Specialty     Prabha Mckinnon MD      Consulting Physician Neurology          Procedures Performed:        History of presenting illness/Hospital Course:    Patient is 29 years old female with a past medical history of migraine, anxiety, depression, asthma, who presented to the ER with a chief complaint of headache and migraine.  Patient reports that her migraine is right-sided 10 out of 10 started 2 days ago and has persisted with no improvement.  Patient was seen at [place] [facility] for the same complaints yesterday.  She reports that she did not have any significant improvement after treatment and she presented to the ER for reevaluation.  Patient reports that she has been having blurry vision over the past couple days, today with persistent migraine she started having difficulty initiating speech and stuttering. She also has associated nausea but no vomiting. Patient reports that she has a history of migraines, never seen a neurologist, she was on medicines for migraines prevention however she stopped taking them long time ago.  She is not currently on any prescribed medicine chronically.  Otherwise denies any recent sickness or illness prior to her migraine.      On ER evaluation, blood pressure was 163/112 on arrival, was slightly tachycardic with a heart rate of 103 otherwise vitals are stable and afebrile.  Labs nonactionable on her CBC and CMP. Chest x-ray with no acute process per my read.  CT head without contrast, CTA head and neck were negative for acute.  Patient received dexamethasone 10 mg, Benadryl, Toradol, museum sulfate and Reglan in the ER.  Due to continued symptoms and headache, neurology consulted and recommended ketamine dose, admission to the hospital for observation and MRI in a.m.  Neurology will consult on the patient.  Hospitalist consulted for admission.    Fortunately MRI brain with no acute findings.  She was seen by neurology who recommended 1 g of Solu-Medrol which did seem to improve patient's overall migraine.  She was advised to take over-the-counter magnesium glycinate 4 to 500 mg daily.  Patient not currently taking any medications.  Had been on Topamax in the past up to 150 mg for prevention of migraines.  Will reinitiate Topamax 25 mg x 1 week and then 50 mg thereafter.  She will follow-up with PCP and neurology as scheduled.  Strict return precautions given.    Vital Signs:    Temp:  [98 °F (36.7 °C)-98.4 °F (36.9 °C)] 98.2 °F (36.8 °C)  Heart Rate:  [] 87  Resp:  [18] 18  BP: ()/() 106/66    Physical Exam:    General Appearance:  Alert and cooperative.  Appearing middle-aged female.   Head:  Atraumatic and normocephalic.   Eyes: Conjunctivae and sclerae normal, no icterus. No pallor.   Ears:  Ears with no abnormalities noted.   Throat: No oral lesions, no thrush, oral mucosa moist.   Neck: Supple, trachea midline, no thyromegaly.   Back:   No kyphoscoliosis present. No tenderness to palpation.   Lungs:   Breath sounds heard bilaterally equally.  No crackles or wheezing. No Pleural rub or bronchial breathing.  On room air unlabored.   Heart:  Normal S1 and S2, no murmur, no gallop, no rub. No JVD.   Abdomen:   Normal  bowel sounds, no masses, no organomegaly. Soft, nontender, nondistended, no rebound tenderness.   Extremities: Supple, no edema, no cyanosis, no clubbing.   Pulses: Pulses palpable bilaterally.   Skin: No bleeding or rash.   Neurologic: Alert and oriented x 3. No facial asymmetry. Moves all four limbs. No tremors.     Pertinent Lab Results:     Results from last 7 days   Lab Units 02/28/25  0620 02/27/25 2039   SODIUM mmol/L 138 136   POTASSIUM mmol/L 4.0 3.6   CHLORIDE mmol/L 108* 103   CO2 mmol/L 19.4* 23.1   BUN mg/dL 14 12   CREATININE mg/dL 0.76 1.00   CALCIUM mg/dL 8.8 9.0   BILIRUBIN mg/dL  --  0.2   ALK PHOS U/L  --  111   ALT (SGPT) U/L  --  9   AST (SGOT) U/L  --  13   GLUCOSE mg/dL 125* 80     Results from last 7 days   Lab Units 02/28/25  0620 02/27/25 2039   WBC 10*3/mm3 9.21 10.60   HEMOGLOBIN g/dL 13.5 13.5   HEMATOCRIT % 39.9 40.2   PLATELETS 10*3/mm3 322 346     Results from last 7 days   Lab Units 02/27/25 2039   INR  0.94                               Pertinent Radiology Results:    Imaging Results (All)       Procedure Component Value Units Date/Time    XR Chest 1 View [[id number]] Collected: 02/28/25 1206     Updated: 02/28/25 1209    Narrative:      PROCEDURE: XR CHEST 1 VW-        HISTORY: Acute Stroke Protocol (onset < 12 hrs)     COMPARISON: July 2023.     FINDINGS: The heart is normal in size. The mediastinum is unremarkable.  There is mild elevation of the right hemidiaphragm. The lungs are clear.  There is no pneumothorax. There are no acute osseous abnormalities.       Impression:      No acute cardiopulmonary process.                       Images were reviewed, interpreted, and dictated by Dr. Nela Levin MD  Transcribed by Renetta Roberts PA-C.     This report was signed and finalized on 2/28/2025 12:07 PM by Nela Levin MD.       CT CEREBRAL PERFUSION WITH & WITHOUT CONTRAST [[id number]] Collected: 02/27/25 2049     Updated: 02/28/25 1116    Addenda:        ADDENDUM REPORT     ADDENDUM:  This report was discussed with MARCELL QUARLES MD on Feb 27, 2025   20:52:00 EST.    Authenticated and Electronically Signed by Paul Soto MD on  02/27/2025 08:52:44 PM  Signed: 02/27/25 2052 by Paul Soto MD            ADDENDUM REPORT    ADDENDUM:  CTA neck with contrast and 3-D postprocessing:    Comparison: None    Findings:    Visualized aortic arch and great vessel takeoffs: Visualized thoracic   aortic arch without aneurysm or dissection. Typical arch branch pattern.   Arch branch vessels are patent.    Bilateral subclavian arteries are patent.    Common carotid arteries are patent.    Widely patent carotid bifurcation and cervical segment internal carotid   arteries.    Vertebral arteries are patent, without dissection or thrombosis.    Visualized basilar artery is patent.    Mild kyphosis of the cervical spine otherwise unremarkable.    Impression:    1. Patent carotid and vertebral arteries without significant stenosis. No   evidence of dissection or thrombosis.    __________________________________    CTA head with contrast and 3-D postprocessing:    Comparison: None    Findings:    Skull base, cavernous, and terminal internal carotid artery segments are   widely patent.    Anterior circulation patent.    Middle circulation patent.    Basilar artery within normal limits.    Posterior circulation intact.    The visualized superior sagittal sinus, the straight sinus, bilateral   transverse and sigmoid sinuses are patent without filling defect.    Authenticated and Electronically Signed by Paul Soto MD on  02/27/2025 09:22:01 PM  Signed: 02/27/25 2122 by Paul Soto MD            ADDENDUM REPORT    ADDENDUM:  CTA neck with contrast and 3-D postprocessing:    Comparison: None    Findings:    Visualized aortic arch and great vessel takeoffs: Visualized thoracic   aortic arch without aneurysm or dissection. Typical arch branch pattern.   Arch branch vessels are patent.     Bilateral subclavian arteries are patent.    Common carotid arteries are patent.    Widely patent carotid bifurcation and cervical segment internal carotid   arteries.    Vertebral arteries are patent, without dissection or thrombosis.    Visualized basilar artery is patent.    Mild kyphosis of the cervical spine otherwise unremarkable.    Impression:    1. Patent carotid and vertebral arteries without significant stenosis. No   evidence of dissection or thrombosis.    __________________________________    CTA head with contrast and 3-D postprocessing:    Comparison: None    Findings:    Skull base, cavernous, and terminal internal carotid artery segments are   widely patent.    Anterior circulation patent.    Middle circulation patent.    Basilar artery within normal limits.    Posterior circulation intact.    The visualized superior sagittal sinus, the straight sinus, bilateral   transverse and sigmoid sinuses are patent without filling defect.    Authenticated and Electronically Signed by Paul Soto MD on  02/27/2025 09:22:01 PM  Signed: 02/27/25 2122 by Paul Soto MD    Narrative:      FINAL REPORT    TECHNIQUE:  null    CLINICAL HISTORY:  Neuro deficit, acute, stroke suspected    speech difficulty, double vision, headache, hypertension    COMPARISON:  null    FINDINGS:  CT head without IV contrast:    Comparison: None    Findings:    Age-appropriate sulcation.    No intracranial mass, midline shift, hydrocephalus, acute hemorrhage or infarct.    Unremarkable orbits.    No significant sinus disease.    Mastoid air cells are clear.    No skull fracture. No significant scalp soft tissue swelling.      Impression:      Impression:    1. No acute intracranial abnormality. Age-appropriate exam.    Authenticated and Electronically Signed by Paul Soto MD on  02/27/2025 08:49:11 PM    CT Angiogram Head w AI Analysis of LVO [[id number]] Collected: 02/27/25 2049     Updated: 02/28/25 1115    Addenda:         ADDENDUM REPORT    ADDENDUM:  CTA neck with contrast and 3-D postprocessing:    Comparison: None    Findings:    Visualized aortic arch and great vessel takeoffs: Visualized thoracic   aortic arch without aneurysm or dissection. Typical arch branch pattern.   Arch branch vessels are patent.    Bilateral subclavian arteries are patent.    Common carotid arteries are patent.    Widely patent carotid bifurcation and cervical segment internal carotid   arteries.    Vertebral arteries are patent, without dissection or thrombosis.    Visualized basilar artery is patent.    Mild kyphosis of the cervical spine otherwise unremarkable.    Impression:    1. Patent carotid and vertebral arteries without significant stenosis. No   evidence of dissection or thrombosis.    __________________________________    CTA head with contrast and 3-D postprocessing:    Comparison: None    Findings:    Skull base, cavernous, and terminal internal carotid artery segments are   widely patent.    Anterior circulation patent.    Middle circulation patent.    Basilar artery within normal limits.    Posterior circulation intact.    The visualized superior sagittal sinus, the straight sinus, bilateral   transverse and sigmoid sinuses are patent without filling defect.    Authenticated and Electronically Signed by Paul Soto MD on  02/27/2025 09:22:01 PM  Signed: 02/27/25 2122 by Paul Soto MD            ADDENDUM REPORT    ADDENDUM:  CTA neck with contrast and 3-D postprocessing:    Comparison: None    Findings:    Visualized aortic arch and great vessel takeoffs: Visualized thoracic   aortic arch without aneurysm or dissection. Typical arch branch pattern.   Arch branch vessels are patent.    Bilateral subclavian arteries are patent.    Common carotid arteries are patent.    Widely patent carotid bifurcation and cervical segment internal carotid   arteries.    Vertebral arteries are patent, without dissection or thrombosis.     Visualized basilar artery is patent.    Mild kyphosis of the cervical spine otherwise unremarkable.    Impression:    1. Patent carotid and vertebral arteries without significant stenosis. No   evidence of dissection or thrombosis.    __________________________________    CTA head with contrast and 3-D postprocessing:    Comparison: None    Findings:    Skull base, cavernous, and terminal internal carotid artery segments are   widely patent.    Anterior circulation patent.    Middle circulation patent.    Basilar artery within normal limits.    Posterior circulation intact.    The visualized superior sagittal sinus, the straight sinus, bilateral   transverse and sigmoid sinuses are patent without filling defect.    Authenticated and Electronically Signed by Paul Soto MD on  02/27/2025 09:22:01 PM  Signed: 02/27/25 2122 by Paul Soto MD    Narrative:      FINAL REPORT    TECHNIQUE:  null    CLINICAL HISTORY:  Neuro deficit, acute, stroke suspected    speech difficulty, double vision, headache, hypertension    COMPARISON:  null    FINDINGS:  CT head without IV contrast:    Comparison: None    Findings:    Age-appropriate sulcation.    No intracranial mass, midline shift, hydrocephalus, acute hemorrhage or infarct.    Unremarkable orbits.    No significant sinus disease.    Mastoid air cells are clear.    No skull fracture. No significant scalp soft tissue swelling.      Impression:      Impression:    1. No acute intracranial abnormality. Age-appropriate exam.    Authenticated and Electronically Signed by Paul Soto MD on  02/27/2025 08:49:11 PM    CT Angiogram Neck [[id number]] Collected: 02/27/25 2049     Updated: 02/28/25 1113    Addenda:        ADDENDUM REPORT    ADDENDUM:  CTA neck with contrast and 3-D postprocessing:    Comparison: None    Findings:    Visualized aortic arch and great vessel takeoffs: Visualized thoracic   aortic arch without aneurysm or dissection. Typical arch branch pattern.    Arch branch vessels are patent.    Bilateral subclavian arteries are patent.    Common carotid arteries are patent.    Widely patent carotid bifurcation and cervical segment internal carotid   arteries.    Vertebral arteries are patent, without dissection or thrombosis.    Visualized basilar artery is patent.    Mild kyphosis of the cervical spine otherwise unremarkable.    Impression:    1. Patent carotid and vertebral arteries without significant stenosis. No   evidence of dissection or thrombosis.    __________________________________    CTA head with contrast and 3-D postprocessing:    Comparison: None    Findings:    Skull base, cavernous, and terminal internal carotid artery segments are   widely patent.    Anterior circulation patent.    Middle circulation patent.    Basilar artery within normal limits.    Posterior circulation intact.    The visualized superior sagittal sinus, the straight sinus, bilateral   transverse and sigmoid sinuses are patent without filling defect.    Authenticated and Electronically Signed by Paul Soto MD on  02/27/2025 09:22:01 PM  Signed: 02/27/25 2122 by Paul Soto MD            ADDENDUM REPORT    ADDENDUM:  CTA neck with contrast and 3-D postprocessing:    Comparison: None    Findings:    Visualized aortic arch and great vessel takeoffs: Visualized thoracic   aortic arch without aneurysm or dissection. Typical arch branch pattern.   Arch branch vessels are patent.    Bilateral subclavian arteries are patent.    Common carotid arteries are patent.    Widely patent carotid bifurcation and cervical segment internal carotid   arteries.    Vertebral arteries are patent, without dissection or thrombosis.    Visualized basilar artery is patent.    Mild kyphosis of the cervical spine otherwise unremarkable.    Impression:    1. Patent carotid and vertebral arteries without significant stenosis. No   evidence of dissection or thrombosis.     __________________________________    CTA head with contrast and 3-D postprocessing:    Comparison: None    Findings:    Skull base, cavernous, and terminal internal carotid artery segments are   widely patent.    Anterior circulation patent.    Middle circulation patent.    Basilar artery within normal limits.    Posterior circulation intact.    The visualized superior sagittal sinus, the straight sinus, bilateral   transverse and sigmoid sinuses are patent without filling defect.    Authenticated and Electronically Signed by Paul Soto MD on  02/27/2025 09:22:01 PM  Signed: 02/27/25 2122 by Paul Soto MD    Narrative:      FINAL REPORT    TECHNIQUE:  null    CLINICAL HISTORY:  Neuro deficit, acute, stroke suspected    speech difficulty, double vision, headache, hypertension    COMPARISON:  null    FINDINGS:  CT head without IV contrast:    Comparison: None    Findings:    Age-appropriate sulcation.    No intracranial mass, midline shift, hydrocephalus, acute hemorrhage or infarct.    Unremarkable orbits.    No significant sinus disease.    Mastoid air cells are clear.    No skull fracture. No significant scalp soft tissue swelling.      Impression:      Impression:    1. No acute intracranial abnormality. Age-appropriate exam.    Authenticated and Electronically Signed by Paul Soto MD on  02/27/2025 08:49:11 PM    MRI Brain With & Without Contrast [[id number]] Collected: 02/28/25 0952     Updated: 02/28/25 0955    Narrative:      PROCEDURE: MRI BRAIN W WO CONTRAST-     HISTORY: With sagittal FLAIR imaging to look for central nervous system  demyelination; G43.811-Other migraine, intractable, with status  migrainosus; R47.9-Unspecified speech disturbances; H53.8-Other visual  disturbances     TECHNIQUE: Multiplanar imaging was performed of the brain with and  without Gadolinium infusion.     FINDINGS: Diffusion sequences show no signal abnormalities to indicate  acute infarct or other process.      Brain parenchyma displays no evidence of mass, hemorrhage or edema.  There is no atrophy. There is symmetric appearance of the temporal  lobes. No extra-axial abnormal findings are seen. The ventricles and  cisterns appear normal. No abnormal enhancing lesions are identified on  the post infusion images. Paranasal sinuses and mastoid air cells are  clear. 2 circumscribed scalp lesions are suspected to be sebaceous  cysts.       Impression:      Unremarkable MRI of the brain, without and with Gadolinium  administration.              Images were reviewed, interpreted, and dictated by Dr. Nela Levin MD  Transcribed by Renetta Roberts PA-C.     This report was signed and finalized on 2/28/2025 9:53 AM by Nela Levin MD.       CT Head Without Contrast Stroke Protocol [[id number]] Collected: 02/27/25 2049     Updated: 02/27/25 2140    Addenda:        ADDENDUM REPORT    ADDENDUM:  CTA neck with contrast and 3-D postprocessing:    Comparison: None    Findings:    Visualized aortic arch and great vessel takeoffs: Visualized thoracic   aortic arch without aneurysm or dissection. Typical arch branch pattern.   Arch branch vessels are patent.    Bilateral subclavian arteries are patent.    Common carotid arteries are patent.    Widely patent carotid bifurcation and cervical segment internal carotid   arteries.    Vertebral arteries are patent, without dissection or thrombosis.    Visualized basilar artery is patent.    Mild kyphosis of the cervical spine otherwise unremarkable.    Impression:    1. Patent carotid and vertebral arteries without significant stenosis. No   evidence of dissection or thrombosis.    __________________________________    CTA head with contrast and 3-D postprocessing:    Comparison: None    Findings:    Skull base, cavernous, and terminal internal carotid artery segments are   widely patent.    Anterior circulation patent.    Middle circulation patent.    Basilar artery within normal limits.     Posterior circulation intact.    The visualized superior sagittal sinus, the straight sinus, bilateral   transverse and sigmoid sinuses are patent without filling defect.    Authenticated and Electronically Signed by Paul Soto MD on  02/27/2025 09:22:01 PM  Signed: 02/27/25 2122 by Paul Soto MD            ADDENDUM REPORT    ADDENDUM:  CTA neck with contrast and 3-D postprocessing:    Comparison: None    Findings:    Visualized aortic arch and great vessel takeoffs: Visualized thoracic   aortic arch without aneurysm or dissection. Typical arch branch pattern.   Arch branch vessels are patent.    Bilateral subclavian arteries are patent.    Common carotid arteries are patent.    Widely patent carotid bifurcation and cervical segment internal carotid   arteries.    Vertebral arteries are patent, without dissection or thrombosis.    Visualized basilar artery is patent.    Mild kyphosis of the cervical spine otherwise unremarkable.    Impression:    1. Patent carotid and vertebral arteries without significant stenosis. No   evidence of dissection or thrombosis.    __________________________________    CTA head with contrast and 3-D postprocessing:    Comparison: None    Findings:    Skull base, cavernous, and terminal internal carotid artery segments are   widely patent.    Anterior circulation patent.    Middle circulation patent.    Basilar artery within normal limits.    Posterior circulation intact.    The visualized superior sagittal sinus, the straight sinus, bilateral   transverse and sigmoid sinuses are patent without filling defect.    Authenticated and Electronically Signed by Paul Soto MD on  02/27/2025 09:22:01 PM  Signed: 02/27/25 2122 by Paul Soto MD            ADDENDUM REPORT    ADDENDUM:  This report was discussed with MARCELL QUARLES MD on Feb 27, 2025   20:52:00 EST.    Authenticated and Electronically Signed by Paul Soto MD on  02/27/2025 08:52:44 PM  Signed:  02/27/25 2052 by Paul Soto MD    Narrative:      FINAL REPORT    TECHNIQUE:  null    CLINICAL HISTORY:  Neuro deficit, acute, stroke suspected    speech difficulty, double vision, headache, hypertension    COMPARISON:  null    FINDINGS:  CT head without IV contrast:    Comparison: None    Findings:    Age-appropriate sulcation.    No intracranial mass, midline shift, hydrocephalus, acute hemorrhage or infarct.    Unremarkable orbits.    No significant sinus disease.    Mastoid air cells are clear.    No skull fracture. No significant scalp soft tissue swelling.      Impression:      Impression:    1. No acute intracranial abnormality. Age-appropriate exam.    Authenticated and Electronically Signed by Paul Soto MD on  02/27/2025 08:49:11 PM            Echo:    Results for orders placed during the hospital encounter of 10/18/21    Adult Transthoracic Echo Complete W/ Cont if Necessary Per Protocol    Interpretation Summary  · Left ventricular ejection fraction appears to be 56 - 60%. Left ventricular systolic function is normal.  · The cardiac valves are structurally and functionally within normal limits.    Condition on Discharge:      Stable.    Code status during the hospital stay:    Code Status and Medical Interventions: CPR (Attempt to Resuscitate); Full Support   Ordered at: 02/27/25 2326     Code Status (Patient has no pulse and is not breathing):    CPR (Attempt to Resuscitate)     Medical Interventions (Patient has pulse or is breathing):    Full Support     Discharge Disposition:    Home or Self Care    Discharge Medications:       Discharge Medications        New Medications        Instructions Start Date   topiramate 25 MG tablet  Commonly known as: Topamax   25 mg, Oral, Nightly      topiramate 50 MG tablet  Commonly known as: Topamax   50 mg, Oral, Nightly, Take after 25 mg x 7 days             Discharge Diet:     Diet Instructions       Diet: Cardiac Diets; Healthy Heart (2-3 Na+); Thin  (IDDSI 0)      Discharge Diet: Cardiac Diets    Cardiac Diet: Healthy Heart (2-3 Na+)    Fluid Consistency: Thin (IDDSI 0)          Activity at Discharge:     Activity Instructions       Activity as Tolerated            Follow-up Appointments:     Follow-up Information       Mickie Bowles APRN. Go on 4/18/2025.    Specialties: Neurology, Nurse Practitioner  Why: @ 2:00pm  Contact information:  [address] [facility] [address]  [facility] [address]  [phone]               Meenakshi Keller APRN Follow up in 1 week(s).    Specialty: Family Medicine  Contact information:  [address] [place] [id number] [phone]                           Future Appointments   Date Time Provider Department Center   4/18/2025  2:00 PM Mickie Bowles APRN [facility]     Test Results Pending at Discharge:    Pending Results       None                 SHASHANK Foster  02/28/25  14:36 EST    Time: I spent >30 minutes on this discharge activity which included: face-to-face encounter with the patient, reviewing the data in the system, coordination of the care with the nursing staff as well as consultants, documentation, and entering orders.     Dictated utilizing Dragon dictation.

## 2025-02-28 NOTE — CONSULTS
"General Neurology Consult Note    Patient Name: Ashish Wilkerson   MRN: [id number]  Age: 29 y.o.  Sex: female  : 1995    Primary Care Physician: Meenakshi Keller APRN  Referring Physician:  Kaela Lomax,*          Chief Complaint/Reason for Consultation:  headache     Subjective .  HPI:   The patient is a 29-year-old female with a known psychiatric history and currently on multiple psychotropic medications. She presents with a headache that she describes as feeling like it \"is going to pop.\" Associated symptoms include facial numbness, which she reports she can still feel when touched, and difficulty with speech, stating she has to think about what she is saying.    The patient has a history of migraines, which are typically managed with 2 Tylenols, 3 ibuprofen, and Benadryl to help her sleep. When this regimen is ineffective, she goes to the hospital to receive a migraine cocktail, which usually resolves the headache. However, this time, despite her usual treatments, the headache has not improved.    She denies any fever, infection, or other systemic symptoms and confirms that she is not pregnant.          Review of Systems   Headache +     Temp:  [98 °F (36.7 °C)-98.4 °F (36.9 °C)] 98.4 °F (36.9 °C)  Heart Rate:  [] 89  Resp:  [18] 18  BP: ()/() 124/73        NEURO( Exam is performed with help of hospital staff at bed side and observed by me via audio-video interface)    MENTAL STATUS: AAOx3, memory intact, fund of knowledge appropriate    LANG/SPEECH: Naming and repetition intact, fluent, follows 3-step commands    CRANIAL NERVES:    Pupils equal and reactive, EOMI intact, no gaze deviation, no nystagmus  No facial droop, cough and gag intact, shoulder shrug intact, tongue midline     MOTOR:  Moves all extremities equally    SENSORY: Normal to light touch all throughout     COORDINATION: Normal finger to nose and heel to shin, no tremor, no dysmetria    STATION: Not " assessed due to patient condition    GAIT: Not assessed due to patient condition         Past Medical History:   Diagnosis Date    Anxiety     Asthma     Depression     Migraine      Past Surgical History:   Procedure Laterality Date    BREAST BIOPSY Right 2021    EXCISIONAL BY SURGEON (NO MAMMO OR US IMAGING)    BREAST MASS EXCISION       SECTION      DENTAL PROCEDURE      DIAGNOSTIC LAPAROSCOPY N/A 2017    Procedure: DIAGNOSTIC LAPAROSCOPY;  Surgeon: Dandy Shelby MD;  Location: [facility] OR;  Service:      Family History   Problem Relation Age of Onset    Ovarian cancer Mother 25    Breast cancer Mother 32    Cancer Mother     Cystic fibrosis Mother     Lupus Mother      Social History     Socioeconomic History    Marital status:    Tobacco Use    Smoking status: Every Day     Types: Cigarettes     Passive exposure: Current    Smokeless tobacco: Never   Vaping Use    Vaping status: Every Day    Substances: Nicotine    Passive vaping exposure: Yes   Substance and Sexual Activity    Alcohol use: Yes    Drug use: Yes     Types: Cocaine(coke)    Sexual activity: Yes     Partners: Male     Birth control/protection: Vaginal insert contraception     Allergies   Allergen Reactions    Zoloft [Sertraline Hcl] Other (See Comments)     Migraines      Prior to Admission medications    Medication Sig Start Date End Date Taking? Authorizing Provider   albuterol sulfate  (90 Base) MCG/ACT inhaler Inhale 2 puffs Every 4 (Four) Hours As Needed for Wheezing. 21  Keila Gonzalez MD   azelastine (ASTELIN) 0.1 % nasal spray  21  Provider, MD Don   bisoprolol (ZEBeta) 5 MG tablet TAKE 0.5 TABLETS BY MOUTH DAILY. 22  Oneida Emerson APRN   budesonide-formoterol (SYMBICORT) 80-4.5 MCG/ACT inhaler Inhale 2 puffs 2 (Two) Times a Day. 21  Keila Gonzalez MD   buPROPion SR (WELLBUTRIN SR) 150 MG 12 hr tablet  5/3/23 2/28/25  Provider,  MD Don   busPIRone (BUSPAR) 15 MG tablet 3 (Three) Times a Day. 10/2/20 2/28/25  Don Melo MD   busPIRone (BUSPAR) 15 MG tablet Take 1 tablet by mouth.  2/28/25  Don Melo MD   clonazePAM (KlonoPIN) 0.5 MG tablet  6/15/23 2/28/25  Don Melo MD   erythromycin (ROMYCIN) 5 MG/GM ophthalmic ointment  5/3/23 2/28/25  Don Melo MD   etonogestrel-ethinyl estradiol (NUVARING) 0.12-0.015 MG/24HR vaginal ring  6/8/23 2/28/25  Don Melo MD   gabapentin (NEURONTIN) 300 MG capsule  5/23/23 2/28/25  Don Melo MD   hydrOXYzine (ATARAX) 50 MG tablet  10/30/20 2/28/25  Don Melo MD   lamoTRIgine (LaMICtal) 100 MG tablet Daily. 10/14/20 2/28/25  Don Melo MD   omeprazole (priLOSEC) 40 MG capsule Take 1 capsule by mouth Daily. 10/30/20 2/28/25  Keila Gonzalez MD   Oxtellar  MG tablet sustained-release 24 hour  5/27/23 2/28/25  Don Melo MD   terbinafine (lamiSIL) 250 MG tablet  6/13/23 2/28/25  Don Melo MD   topiramate (TOPAMAX) 100 MG tablet Take 150 mg by mouth.  2/28/25  Don Melo MD   Topiramate  MG capsule extended-release 24 hour sprinkle  5/10/21 2/28/25  Don Melo MD   traZODone (DESYREL) 50 MG tablet Every Night. 10/2/20 2/28/25  Don Melo MD       Hospital Meds:  Scheduled- sodium chloride, 10 mL, Intravenous, Q12H      Infusions-     PRNs-   acetaminophen **OR** acetaminophen **OR** acetaminophen    senna-docusate sodium **AND** polyethylene glycol **AND** bisacodyl **AND** bisacodyl    butalbital-acetaminophen-caffeine    diphenhydrAMINE    ketorolac    Magnesium Standard Dose Replacement - Follow Nurse / BPA Driven Protocol    melatonin    metoclopramide    ondansetron    Potassium Replacement - Follow Nurse / BPA Driven Protocol    sodium chloride    sodium chloride    sodium chloride      Results Reviewed:  I have personally reviewed  current lab, radiology, and data and agree with results.    Results for orders placed during the hospital encounter of 10/18/21    Adult Transthoracic Echo Complete W/ Cont if Necessary Per Protocol    Interpretation Summary  · Left ventricular ejection fraction appears to be 56 - 60%. Left ventricular systolic function is normal.  · The cardiac valves are structurally and functionally within normal limits.            Assessment/Plan:  Complicated migraine / functional overlay on exam with stammered speech   MRI brain is normal   CTA, CT perfusion- normal     Plan  -solumedrol 1 gm one time  -educated about overuse headache   Outpatient neurologist -for headache   -oTC magneisum glycinate 400-500 daily   -continue her home medications( she is on numerous psychotropic/mood stabilising medications).          Deven Barrios MD  February 28, 2025  10:48 EST        This was an audio and video enabled telemedicine encounter.    The consult was conducted in real time using interactive audio and video technology. Patient/Family was informed of the technology being used for this visit and agreed to proceed. Patient located in hospital and I am the provider located at home/office based setting.

## 2025-02-28 NOTE — ED PROVIDER NOTES
EMERGENCY DEPARTMENT ENCOUNTER    Pt Name: Ashish Wilkerson  MRN: [id number]  Pt :   1995  Room Number:    Date of encounter:  2025  PCP: Meenakshi Keller APRN  ED Provider: Timmy Reed MD    Historian: Patient      HPI:  Chief Complaint   Patient presents with    Headache    Blurred Vision          Context: Ashish Wilkerson is a 29 y.o. female who presents to the ED c/o headache, severe, 10 out of 10, associate with blurred vision, no speech difficulty, headache started yesterday has gotten progressively worse was seen at [facility] yesterday, received medications, reports she did not have significant improvement, today she developed the speech and vision difficulties and therefore presents here.  The patient reports he does have history of migraines but these are new symptoms she has not had with a migraine previously.  Reports nausea and vomiting association with the migraine.      PAST MEDICAL HISTORY  Past Medical History:   Diagnosis Date    Anxiety     Asthma     Depression     Migraine          PAST SURGICAL HISTORY  Past Surgical History:   Procedure Laterality Date    BREAST BIOPSY Right 2021    EXCISIONAL BY SURGEON (NO MAMMO OR US IMAGING)    BREAST MASS EXCISION       SECTION      DENTAL PROCEDURE      DIAGNOSTIC LAPAROSCOPY N/A 2017    Procedure: DIAGNOSTIC LAPAROSCOPY;  Surgeon: Dandy Shelby MD;  Location: [facility];  Service:          FAMILY HISTORY  Family History   Problem Relation Age of Onset    Ovarian cancer Mother 25    Breast cancer Mother 32    Cancer Mother     Cystic fibrosis Mother     Lupus Mother          SOCIAL HISTORY  Social History     Socioeconomic History    Marital status:    Tobacco Use    Smoking status: Every Day     Types: Cigarettes     Passive exposure: Current    Smokeless tobacco: Never   Vaping Use    Vaping status: Every Day    Substances: Nicotine    Passive vaping exposure: Yes   Substance  and Sexual Activity    Alcohol use: Yes    Drug use: Yes     Types: Cocaine(coke)    Sexual activity: Yes     Partners: Male     Birth control/protection: Vaginal insert contraception         ALLERGIES  Zoloft [sertraline hcl]        REVIEW OF SYSTEMS  Review of Systems   Constitutional:  Negative for chills and fever.   HENT:  Negative for sore throat and trouble swallowing.    Eyes:  Positive for visual disturbance. Negative for pain and redness.   Respiratory:  Negative for cough and shortness of breath.    Cardiovascular:  Negative for chest pain and leg swelling.   Gastrointestinal:  Negative for abdominal pain, nausea and vomiting.   Genitourinary:  Negative for dysuria and urgency.   Musculoskeletal:  Negative for back pain and neck pain.   Skin:  Negative for rash and wound.   Neurological:  Positive for speech difficulty and headaches. Negative for dizziness and weakness.        All systems reviewed and negative except for those discussed in HPI.       PHYSICAL EXAM    I have reviewed the triage vital signs and nursing notes.    ED Triage Vitals [02/27/25 1953]   Temp Heart Rate Resp BP SpO2   98 °F (36.7 °C) 103 18 (!) 163/112 100 %      Temp src Heart Rate Source Patient Position BP Location FiO2 (%)   Oral Monitor Sitting Left arm --       Physical Exam  Constitutional:       Appearance: Normal appearance. She is not ill-appearing.   HENT:      Head: Normocephalic and atraumatic.      Right Ear: External ear normal.      Left Ear: External ear normal.      Nose: Nose normal.      Mouth/Throat:      Mouth: Mucous membranes are moist.      Pharynx: Oropharynx is clear.   Eyes:      Extraocular Movements: Extraocular movements intact.      Conjunctiva/sclera: Conjunctivae normal.      Pupils: Pupils are equal, round, and reactive to light.   Cardiovascular:      Rate and Rhythm: Normal rate and regular rhythm.      Pulses:           Radial pulses are 2+ on the right side and 2+ on the left side.    Pulmonary:      Effort: Pulmonary effort is normal.      Breath sounds: Normal breath sounds.   Abdominal:      General: There is no distension.      Palpations: Abdomen is soft.      Tenderness: There is no abdominal tenderness.   Musculoskeletal:         General: No tenderness or deformity. Normal range of motion.      Cervical back: Normal range of motion and neck supple.      Right lower leg: No edema.      Left lower leg: No edema.   Skin:     General: Skin is warm and dry.      Capillary Refill: Capillary refill takes less than 2 seconds.   Neurological:      General: No focal deficit present.      Mental Status: She is alert and oriented to person, place, and time.      GCS: GCS eye subscore is 4. GCS verbal subscore is 5. GCS motor subscore is 6.      Cranial Nerves: Cranial nerves 2-12 are intact.      Sensory: Sensation is intact.      Motor: Motor function is intact.      Comments: Halting speech noted            LAB RESULTS  Recent Results (from the past 24 hours)   POC Glucose Once    Collection Time: 02/27/25  7:56 PM    Specimen: Blood   Result Value Ref Range    Glucose 111 70 - 130 mg/dL   Comprehensive Metabolic Panel    Collection Time: 02/27/25  8:39 PM    Specimen: Blood   Result Value Ref Range    Glucose 80 65 - 99 mg/dL    BUN 12 6 - 20 mg/dL    Creatinine 1.00 0.57 - 1.00 mg/dL    Sodium 136 136 - 145 mmol/L    Potassium 3.6 3.5 - 5.2 mmol/L    Chloride 103 98 - 107 mmol/L    CO2 23.1 22.0 - 29.0 mmol/L    Calcium 9.0 8.6 - 10.5 mg/dL    Total Protein 7.1 6.0 - 8.5 g/dL    Albumin 4.2 3.5 - 5.2 g/dL    ALT (SGPT) 9 1 - 33 U/L    AST (SGOT) 13 1 - 32 U/L    Alkaline Phosphatase 111 39 - 117 U/L    Total Bilirubin 0.2 0.0 - 1.2 mg/dL    Globulin 2.9 gm/dL    A/G Ratio 1.4 g/dL    BUN/Creatinine Ratio 12.0 7.0 - 25.0    Anion Gap 9.9 5.0 - 15.0 mmol/L    eGFR 79.4 >60.0 mL/min/1.73   Protime-INR    Collection Time: 02/27/25  8:39 PM    Specimen: Blood   Result Value Ref Range    Protime 13.0  12.3 - 15.1 Seconds    INR 0.94 0.90 - 1.10   aPTT    Collection Time: 02/27/25  8:39 PM    Specimen: Blood   Result Value Ref Range    PTT 27.7 23.0 - 35.0 seconds   Green Top (Gel)    Collection Time: 02/27/25  8:39 PM   Result Value Ref Range    Extra Tube Hold for add-ons.    Lavender Top    Collection Time: 02/27/25  8:39 PM   Result Value Ref Range    Extra Tube hold for add-on    Gold Top - SST    Collection Time: 02/27/25  8:39 PM   Result Value Ref Range    Extra Tube Hold for add-ons.    Light Blue Top    Collection Time: 02/27/25  8:39 PM   Result Value Ref Range    Extra Tube Hold for add-ons.    CBC Auto Differential    Collection Time: 02/27/25  8:39 PM    Specimen: Blood   Result Value Ref Range    WBC 10.60 3.40 - 10.80 10*3/mm3    RBC 4.74 3.77 - 5.28 10*6/mm3    Hemoglobin 13.5 12.0 - 15.9 g/dL    Hematocrit 40.2 34.0 - 46.6 %    MCV 84.8 79.0 - 97.0 fL    MCH 28.5 26.6 - 33.0 pg    MCHC 33.6 31.5 - 35.7 g/dL    RDW 12.1 (L) 12.3 - 15.4 %    RDW-SD 37.1 37.0 - 54.0 fl    MPV 11.1 6.0 - 12.0 fL    Platelets 346 140 - 450 10*3/mm3    Neutrophil % 62.5 42.7 - 76.0 %    Lymphocyte % 27.0 19.6 - 45.3 %    Monocyte % 7.1 5.0 - 12.0 %    Eosinophil % 2.2 0.3 - 6.2 %    Basophil % 0.8 0.0 - 1.5 %    Immature Grans % 0.4 0.0 - 0.5 %    Neutrophils, Absolute 6.64 1.70 - 7.00 10*3/mm3    Lymphocytes, Absolute 2.86 0.70 - 3.10 10*3/mm3    Monocytes, Absolute 0.75 0.10 - 0.90 10*3/mm3    Eosinophils, Absolute 0.23 0.00 - 0.40 10*3/mm3    Basophils, Absolute 0.08 0.00 - 0.20 10*3/mm3    Immature Grans, Absolute 0.04 0.00 - 0.05 10*3/mm3    nRBC 0.0 0.0 - 0.2 /100 WBC       If labs were ordered, I independently reviewed the results and considered them in treating the patient.        RADIOLOGY  CT Head Without Contrast Stroke Protocol    Addendum Date: 2/27/2025    ADDENDUM REPORT ADDENDUM: CTA neck with contrast and 3-D postprocessing: Comparison: None Findings: Visualized aortic arch and great vessel takeoffs:  Visualized thoracic aortic arch without aneurysm or dissection. Typical arch branch pattern. Arch branch vessels are patent. Bilateral subclavian arteries are patent. Common carotid arteries are patent. Widely patent carotid bifurcation and cervical segment internal carotid arteries. Vertebral arteries are patent, without dissection or thrombosis. Visualized basilar artery is patent. Mild kyphosis of the cervical spine otherwise unremarkable. Impression: 1. Patent carotid and vertebral arteries without significant stenosis. No evidence of dissection or thrombosis. __________________________________ CTA head with contrast and 3-D postprocessing: Comparison: None Findings: Skull base, cavernous, and terminal internal carotid artery segments are widely patent. Anterior circulation patent. Middle circulation patent. Basilar artery within normal limits. Posterior circulation intact. The visualized superior sagittal sinus, the straight sinus, bilateral transverse and sigmoid sinuses are patent without filling defect. Authenticated and Electronically Signed by Paul Soto MD on 02/27/2025 09:22:01 PM    Addendum Date: 2/27/2025    ADDENDUM REPORT ADDENDUM: CTA neck with contrast and 3-D postprocessing: Comparison: None Findings: Visualized aortic arch and great vessel takeoffs: Visualized thoracic aortic arch without aneurysm or dissection. Typical arch branch pattern. Arch branch vessels are patent. Bilateral subclavian arteries are patent. Common carotid arteries are patent. Widely patent carotid bifurcation and cervical segment internal carotid arteries. Vertebral arteries are patent, without dissection or thrombosis. Visualized basilar artery is patent. Mild kyphosis of the cervical spine otherwise unremarkable. Impression: 1. Patent carotid and vertebral arteries without significant stenosis. No evidence of dissection or thrombosis. __________________________________ CTA head with contrast and 3-D postprocessing:  Comparison: None Findings: Skull base, cavernous, and terminal internal carotid artery segments are widely patent. Anterior circulation patent. Middle circulation patent. Basilar artery within normal limits. Posterior circulation intact. The visualized superior sagittal sinus, the straight sinus, bilateral transverse and sigmoid sinuses are patent without filling defect. Authenticated and Electronically Signed by Paul Soto MD on 02/27/2025 09:22:01 PM    Addendum Date: 2/27/2025    ADDENDUM REPORT ADDENDUM: This report was discussed with MARCELL QUARLES MD on Feb 27, 2025 20:52:00 EST. Authenticated and Electronically Signed by Paul Soto MD on 02/27/2025 08:52:44 PM    Result Date: 2/27/2025  FINAL REPORT TECHNIQUE: null CLINICAL HISTORY: Neuro deficit, acute, stroke suspected speech difficulty, double vision, headache, hypertension COMPARISON: null FINDINGS: CT head without IV contrast: Comparison: None Findings: Age-appropriate sulcation. No intracranial mass, midline shift, hydrocephalus, acute hemorrhage or infarct. Unremarkable orbits. No significant sinus disease. Mastoid air cells are clear. No skull fracture. No significant scalp soft tissue swelling.     Impression: 1. No acute intracranial abnormality. Age-appropriate exam. Authenticated and Electronically Signed by Paul Soto MD on 02/27/2025 08:49:11 PM         PROCEDURES    Procedures    Interpretations    O2 Sat: The patient's oxygen saturation was 96% on Room Air.  This was independently interpreted by me as Normal    EKG: I reviewed and independently interpreted the EKG as sinus tach rate of 116, normal axis, normal intervals, no ST elevation, no T wave inversions    Cardiac Monitoring: I reviewed and independently interpreted the Rhythm Strip as Normal Sinus rhythm rate of 81    Radiology: I ordered and independently reviewed the above noted radiographic studies.  I viewed images of Chest Xray which showed No pulmonary process per  my independent interpretation. See radiologist's dictation for official interpretation.     Radiology: I ordered and independently reviewed the above noted radiographic studies.  I viewed images of CT Head and CTA head and neck which showed No intracranial hemorrhage per my independent interpretation. See radiologist's dictation for official interpretation      MEDICATIONS GIVEN IN ER    Medications   sodium chloride 0.9 % flush 10 mL (has no administration in time range)   ketorolac (TORADOL) injection 15 mg (15 mg Intravenous Given 2/27/25 2105)   metoclopramide (REGLAN) injection 10 mg (10 mg Intravenous Given 2/27/25 2103)   diphenhydrAMINE (BENADRYL) injection 12.5 mg (12.5 mg Intravenous Given 2/27/25 2102)   magnesium sulfate in D5W 1g/100mL (PREMIX) (0 g Intravenous Stopped 2/27/25 2149)   dexAMETHasone sodium phosphate injection 10 mg (10 mg Intravenous Given 2/27/25 2104)   ketamine hcl syringe solution prefilled syringe 27.2 mg (27.2 mg Intravenous Given 2/27/25 2203)   iopamidol (ISOVUE-300) 61 % injection 50 mL (50 mL Intravenous Given 2/27/25 2201)   iopamidol (ISOVUE-300) 61 % injection 100 mL (100 mL Intravenous Given 2/27/25 2201)         MEDICAL DECISION MAKING, PROGRESS, and CONSULTS    All labs, if obtained, have been independently reviewed by me.  All radiology studies, if obtained, have been reviewed by me and the radiologist dictating the report.  All EKG's, if obtained, have been independently viewed and interpreted by me      Discussion below represents my analysis of pertinent findings related to patient's condition, differential diagnosis, treatment plan and final disposition.      Differential diagnosis:    29-year-old female with history of migraines presented ED with severe onset of acute migraine, she has blurred vision, speech difficulty, these are new symptoms, could certainly be complex migraine, worsening of her underlying migraine disease, however she could also have intracranial  catastrophe given the symptoms, obtain CT, CT, CT perfusion, provide Toradol, Reglan, Benadryl, mag, Decadron    Additional Sources:  External (non-ED) record review:  ER visit 2/26/2025 regarding migraine       Orders placed during this visit:  Orders Placed This Encounter   Procedures    CT Head Without Contrast Stroke Protocol    CT Angiogram Head w AI Analysis of LVO    CT Angiogram Neck    CT CEREBRAL PERFUSION WITH & WITHOUT CONTRAST    XR Chest 1 View    MRI Brain With & Without Contrast    Orgas Draw    Comprehensive Metabolic Panel    Protime-INR    aPTT    CBC Auto Differential    NPO Diet NPO Type: Strict NPO    Initiate Department's Acute Stroke Process (Team D, Code 19, etc.)    Perform NIH Stroke Scale    Measure Actual Weight    Notify Provider    Notify Provider for SBP Greater Than 140 for Hemorrhagic Stroke Patient    Head of Bed 30 Degrees or Less    Undress and Gown    Continuous Pulse Oximetry    Vital Signs    Neuro Checks    No Hypotonic Fluids    Nursing Dysphagia Screening (Complete Prior to Giving anything PO)    RN to Place Order SLP Consult (IF swallow screen failed) - Eval & Treat Choosing Reason of RN Dysphagia Screen Failed    Inpatient Neurology Consult Stroke    Inpatient Neurology Consult Stroke    Oxygen Therapy- Nasal Cannula; Titrate 1-6 LPM Per SpO2; 90 - 95%    POC Glucose Once    POC Glucose Once    ECG 12 Lead Stroke Evaluation    Insert Large-Bore Peripheral IV - RIGHT AC Preferred    Initiate Observation Status    CBC & Differential    Green Top (Gel)    Lavender Top    Gold Top - SST    Light Blue Top         Additional orders considered but not ordered:  None    ED Course:    Consultants:  Hospitalist and Neurologist - Dr. Mckinnon    ED Course as of 02/27/25 2231   Thu Feb 27, 2025   2140 CT and CT perfusion are benign, patient still with headache, still with slurred speech, will have neurology see the patient, spoke to Dr. Mckinnon who will call me back [CS]   2155  Spoke to Dr. Mckinnon, recommends ketamine, MRI in the morning, will eval the patient as a consult as an inpatient [CS]   2231 Spoke directly to Dr. Cabello, he agrees to evaluate the patient for admission, patient aware and agreeable with the plan for admission [CS]      ED Course User Index  [CS] Timmy Reed MD           After my consideration of clinical presentation and any laboratory/radiology studies obtained, I discussed the findings with the patient/patient representative who is in agreement with the treatment plan and the final disposition. Risks and benefits of admission was discussed     AS OF 22:31 EST VITALS:    BP - 102/62  HR - 81  TEMP - 98 °F (36.7 °C) (Oral)  O2 SATS - 96%    I reviewed the patient's prescription monitoring report if available prior to discharge    DIAGNOSIS  Final diagnoses:   Other migraine with status migrainosus, intractable   Difficulty with speech   Blurred vision         DISPOSITION  ED Disposition       ED Disposition   Decision to Admit    Condition   --    Comment   Level of Care: Telemetry [5]   Diagnosis: Migraine [539807]                     Please note that portions of this document were completed with voice recognition software.        Timmy Reed MD  02/27/25 2231

## 2025-04-18 ENCOUNTER — OFFICE VISIT (OUTPATIENT)
Dept: NEUROLOGY | Facility: CLINIC | Age: 30
End: 2025-04-18
Payer: MEDICAID

## 2025-04-18 VITALS
BODY MASS INDEX: 33.3 KG/M2 | DIASTOLIC BLOOD PRESSURE: 72 MMHG | SYSTOLIC BLOOD PRESSURE: 110 MMHG | OXYGEN SATURATION: 98 % | RESPIRATION RATE: 14 BRPM | HEIGHT: 67 IN | WEIGHT: 212.2 LBS | HEART RATE: 98 BPM | TEMPERATURE: 98.4 F

## 2025-04-18 DIAGNOSIS — G43.E01 CHRONIC MIGRAINE WITH AURA AND WITH STATUS MIGRAINOSUS, NOT INTRACTABLE: Primary | ICD-10-CM

## 2025-04-18 DIAGNOSIS — G43.909 ACUTE MIGRAINE: ICD-10-CM

## 2025-04-18 RX ORDER — TOPIRAMATE 50 MG/1
50 TABLET, FILM COATED ORAL 2 TIMES DAILY
Qty: 180 TABLET | Refills: 1 | Status: SHIPPED | OUTPATIENT
Start: 2025-04-18

## 2025-04-18 RX ORDER — SUMATRIPTAN 50 MG/1
50 TABLET, FILM COATED ORAL DAILY PRN
Qty: 9 TABLET | Refills: 6 | Status: SHIPPED | OUTPATIENT
Start: 2025-04-18

## 2025-04-18 NOTE — PROGRESS NOTES
New Patient Office Visit      Patient Name: Ashish Wilkerson  : 1995   MRN: [id number]     Referring Physician: No ref. provider found    Chief Complaint:    Chief Complaint   Patient presents with    Establish Care     Patient is in office to establish care for migraines. Pt reports she has had migraines since she was 9-10 but they have worsened over the past few years. Pt reports 6- HA days    Meds tried: topamax.       History of Present Illness: Ashish Wilkerson is a 30 y.o. female who is here today to establish care with Neurology.  She has never been sen before in neurology. She was referred to us from [facility] ([facility]) for migraine HA and blurred vision where she was seen 25, 25 to 25. Tele neurology ([facility]) was consulted and recommended MRI.     [facility] .  She reports she has been suffering from migraine HA since pediatrics. She experienced the worst migraine of her life when she went to the ER as she was having severe pain and new onset RUE numbness and trouble concentrating. MRI was completed. She was started on Topamax 50 mg QHS and she reports good tolerance and compliance. She has never had a rescue medication; she uses OTC Tylenol and this does not help with her HA. She likes to sleep her HA off in a cold dark room. + N/V. + aura with blurry vision and white lines. + light sensitivity. + sound sensitivity. HA can last > 2 weeks. Meds Tried and Failed: Topamax, magnesium, tylenol, duloxetine, amitriptyline, Fioricet, Prozac, gabapentin, Wellbutrin, bisoprolol, Lamictal . Triggers: scents and stress    SOCIAL: Engaged, Ifeanyi. Two children (ages 10 and 8). She works FT at [employer] at a . Current vaping nicotine. No tobacco or smokeless tobacco. Social THC use- rare. No ETOH. No  service.     [facility] Neuro: Maternal Grandmother- Dementia     Recent Imaging:     MRI Brain w/wo - noncontributory   CT Angiogram Head w AI  Analysis of LVO - noncontributory   CT Angiogram Neck - noncontributory   CT Cerebral Perfusion W/WO - noncontributory   CT Head WO CVA Protocol - noncontributory     Pertinent Medical History: anxiety, asthma, depression, migraine, syncope, palpations, hypotension      Subjective      Review of Systems:   Review of Systems   Constitutional: Negative.    HENT: Negative.     Eyes: Negative.    Respiratory: Negative.     Cardiovascular: Negative.    Gastrointestinal: Negative.    Endocrine: Negative.    Genitourinary: Negative.    Musculoskeletal: Negative.    Skin: Negative.    Allergic/Immunologic: Negative.    Neurological:  Positive for headache.   Hematological: Negative.    Psychiatric/Behavioral: Negative.     All other systems reviewed and are negative.      Past Medical History:   Past Medical History:   Diagnosis Date    Anxiety     Asthma     Depression     Migraine        Past Surgical History:   Past Surgical History:   Procedure Laterality Date    BREAST BIOPSY Right 2021    EXCISIONAL BY SURGEON (NO MAMMO OR US IMAGING)    BREAST MASS EXCISION       SECTION      DENTAL PROCEDURE      DIAGNOSTIC LAPAROSCOPY N/A 2017    Procedure: DIAGNOSTIC LAPAROSCOPY;  Surgeon: Dandy Shelby MD;  Location: [facility];  Service:        Family History:   Family History   Problem Relation Age of Onset    Ovarian cancer Mother 25    Breast cancer Mother 32    Cancer Mother     Cystic fibrosis Mother     Lupus Mother        Social History:   Social History     Socioeconomic History    Marital status:    Tobacco Use    Smoking status: Never     Passive exposure: Current    Smokeless tobacco: Never   Vaping Use    Vaping status: Every Day    Substances: Nicotine    Passive vaping exposure: Yes   Substance and Sexual Activity    Alcohol use: Yes    Drug use: Never    Sexual activity: Yes     Partners: Male     Birth control/protection: Vaginal insert contraception  "      Medications:     Current Outpatient Medications:     topiramate (Topamax) 50 MG tablet, Take 1 tablet by mouth 2 (Two) Times a Day., Disp: 180 tablet, Rfl: 1    SUMAtriptan (Imitrex) 50 MG tablet, Take 1 tablet by mouth Daily As Needed for Migraine. Take one tablet at onset of headache. May repeat dose one time in 2 hours if headache not relieved., Disp: 9 tablet, Rfl: 6    Allergies:   Allergies   Allergen Reactions    Zoloft [Sertraline Hcl] Other (See Comments)     Migraines        Objective     Physical Exam:  Vital Signs:   Vitals:    04/18/25 1346   BP: 110/72   BP Location: Right arm   Patient Position: Sitting   Cuff Size: Adult   Pulse: 98   Resp: 14   Temp: 98.4 °F (36.9 °C)   TempSrc: Temporal   SpO2: 98%   Weight: 96.3 kg (212 lb 3.2 oz)   Height: 170.2 cm (67.01\")   PainSc: 0-No pain     Body mass index is 33.23 kg/m².     Physical Exam  Vitals and nursing note reviewed.   Constitutional:       General: She is not in acute distress.     Appearance: Normal appearance.   HENT:      Head: Normocephalic.      Nose: Nose normal.      Mouth/Throat:      Mouth: Mucous membranes are moist.      Pharynx: Oropharynx is clear.   Eyes:      General: Lids are normal.      Extraocular Movements: Extraocular movements intact.      Conjunctiva/sclera: Conjunctivae normal.      Pupils: Pupils are equal, round, and reactive to light.   Musculoskeletal:      Cervical back: Normal range of motion and neck supple.   Skin:     General: Skin is warm and dry.      Capillary Refill: Capillary refill takes less than 2 seconds.   Neurological:      General: No focal deficit present.      Mental Status: She is oriented to person, place, and time.      Cranial Nerves: No cranial nerve deficit.      Sensory: No sensory deficit.      Motor: Motor strength is normal.No weakness.      Coordination: Romberg sign positive. Coordination normal.      Gait: Gait normal.      Deep Tendon Reflexes: Reflexes normal.   Psychiatric:        "  Mood and Affect: Mood normal.         Behavior: Behavior normal.         Neurological Exam  Mental Status  Awake, alert and oriented to person, place and time. Oriented to person, place, and time.    Cranial Nerves  CN II: Visual acuity is normal. Visual fields full to confrontation.  CN III, IV, VI: Extraocular movements intact bilaterally. Normal lids and orbits bilaterally. Pupils equal round and reactive to light bilaterally.  CN V: Facial sensation is normal.  CN VII: Full and symmetric facial movement.  CN IX, X: Palate elevates symmetrically. Normal gag reflex.  CN XI: Shoulder shrug strength is normal.  CN XII: Tongue midline without atrophy or fasciculations.    Motor  Normal muscle bulk throughout. No fasciculations present. Normal muscle tone. No abnormal involuntary movements. Strength is 5/5 throughout all four extremities.    Sensory  Light touch is normal in upper and lower extremities.     Coordination  Right: Finger-to-nose normal. Rapid alternating movement normal.Left: Finger-to-nose normal. Rapid alternating movement normal.    Gait   Normal gait.Casual gait is normal including stance, stride, and arm swing. Romberg is present. Able to rise from chair without using arms.      PHQ-9 Total Score:       Assessment / Plan      Assessment/Plan:   Diagnoses and all orders for this visit:    1. Chronic migraine with aura and with status migrainosus, not intractable (Primary)  -     topiramate (Topamax) 50 MG tablet; Take 1 tablet by mouth 2 (Two) Times a Day.  Dispense: 180 tablet; Refill: 1    2. Acute migraine  -     SUMAtriptan (Imitrex) 50 MG tablet; Take 1 tablet by mouth Daily As Needed for Migraine. Take one tablet at onset of headache. May repeat dose one time in 2 hours if headache not relieved.  Dispense: 9 tablet; Refill: 6         Follow Up:   Return in about 3 months (around 7/18/2025), or if symptoms worsen or fail to improve.    Anticipatory Guidance and Safety Reviewed  Patient  Education Completed  MIDAS score 60 (severe disability)  Continue/increase Topamax 50 mg BID (prevention); SE reviewed  Continue Imitrex 50 mg PRN (abortive); SE reviewed.   Encouraged HA journaling for trigger identification and prevention  Encouraged limitation of Nsaids/Tylenol use < 2 month to prevent MOH   Nurtec 75 mg PRN (abortive); SE reviewed. Samples provided     RTC PRN or within 12 weeks or sooner with issues     SHASHANK Rincon  [facility]

## 2025-06-04 DIAGNOSIS — G43.909 ACUTE MIGRAINE: Primary | ICD-10-CM

## 2025-06-04 RX ORDER — RIZATRIPTAN BENZOATE 10 MG/1
10 TABLET ORAL DAILY PRN
Qty: 9 TABLET | Refills: 2 | Status: SHIPPED | OUTPATIENT
Start: 2025-06-04

## 2025-06-18 ENCOUNTER — HOSPITAL ENCOUNTER (EMERGENCY)
Facility: HOSPITAL | Age: 30
Discharge: HOME | End: 2025-06-18
Attending: EMERGENCY MEDICINE
Payer: MEDICAID

## 2025-06-18 ENCOUNTER — APPOINTMENT (OUTPATIENT)
Dept: GENERAL RADIOLOGY | Facility: HOSPITAL | Age: 30
End: 2025-06-18
Attending: EMERGENCY MEDICINE
Payer: MEDICAID

## 2025-06-18 VITALS
WEIGHT: 195 LBS | HEART RATE: 85 BPM | OXYGEN SATURATION: 98 % | HEIGHT: 67 IN | SYSTOLIC BLOOD PRESSURE: 105 MMHG | DIASTOLIC BLOOD PRESSURE: 79 MMHG | TEMPERATURE: 98.1 F | RESPIRATION RATE: 16 BRPM | BODY MASS INDEX: 30.61 KG/M2

## 2025-06-18 DIAGNOSIS — S93.492A SPRAIN OF ANTERIOR TALOFIBULAR LIGAMENT OF LEFT ANKLE, INITIAL ENCOUNTER: Primary | ICD-10-CM

## 2025-06-18 PROCEDURE — 99283 EMERGENCY DEPT VISIT LOW MDM: CPT

## 2025-06-18 PROCEDURE — 73610 X-RAY EXAM OF ANKLE: CPT

## 2025-06-18 PROCEDURE — 6370000000 HC RX 637 (ALT 250 FOR IP): Performed by: EMERGENCY MEDICINE

## 2025-06-18 RX ORDER — ACETAMINOPHEN 500 MG
1000 TABLET ORAL 3 TIMES DAILY PRN
Qty: 90 TABLET | Refills: 0 | Status: SHIPPED | OUTPATIENT
Start: 2025-06-18

## 2025-06-18 RX ORDER — ACETAMINOPHEN 500 MG
1000 TABLET ORAL ONCE
Status: COMPLETED | OUTPATIENT
Start: 2025-06-18 | End: 2025-06-18

## 2025-06-18 RX ADMIN — ACETAMINOPHEN 1000 MG: 500 TABLET, FILM COATED ORAL at 23:10

## 2025-06-18 ASSESSMENT — PAIN DESCRIPTION - DESCRIPTORS: DESCRIPTORS: ACHING

## 2025-06-18 ASSESSMENT — PAIN DESCRIPTION - LOCATION: LOCATION: ANKLE

## 2025-06-18 ASSESSMENT — PAIN DESCRIPTION - PAIN TYPE: TYPE: ACUTE PAIN

## 2025-06-18 ASSESSMENT — PAIN - FUNCTIONAL ASSESSMENT
PAIN_FUNCTIONAL_ASSESSMENT: NONE - DENIES PAIN
PAIN_FUNCTIONAL_ASSESSMENT: 0-10

## 2025-06-18 ASSESSMENT — PAIN SCALES - GENERAL: PAINLEVEL_OUTOF10: 8

## 2025-06-18 ASSESSMENT — PAIN DESCRIPTION - ORIENTATION: ORIENTATION: LEFT

## 2025-06-19 NOTE — DISCHARGE INSTRUCTIONS
Apply a compressive ACE bandage. Rest and elevate the affected painful area.  Apply cold compresses intermittently as needed.  As pain recedes, begin normal activities slowly as tolerated.  Call if symptoms persist.   Follow-up with your primary care provider in 7 to 10 days for further evaluation and if needed to get an MRI done.

## 2025-06-19 NOTE — ED TRIAGE NOTES
Pt states she fell down 5 steps and injured her left ankle.  She states she heard a \"crack\" sound.  She rates her pain 7/10 at this time.  Ice pack provided during triage.

## 2025-06-19 NOTE — ED PROVIDER NOTES
[facility]  EMERGENCY DEPARTMENT ENCOUNTER        Pt Name: Patrick Torres  MRN: [id number]  Birthdate 1995  Date of evaluation: 2025  Provider: Adam Goldberg MD  PCP: Ml Calvillo APRN - CNP  Note Started: 10:49 PM EDT 25    CHIEF COMPLAINT       Chief Complaint   Patient presents with    Ankle Pain    Fall       HISTORY OF PRESENT ILLNESS: 1 or more Elements     History from : Patient    Limitations to history : None    Patrick Torres is a 30 y.o. female who presents to the department with chief complaint of having pain and discomfort on the left ankle after she fell off about 5 stairs and twisted her left ankle while she fell.  She has mild degree of swelling on the lateral aspect of the left ankle.      Nursing Notes were all reviewed and agreed with or any disagreements were addressed in the HPI.    REVIEW OF SYSTEMS :           systems reviewed and negative except as in HPI/MDM    PAST MEDICAL HISTORY     Past Medical History:   Diagnosis Date    Anxiety     Depression     Fibromyalgia     Migraine     Miscarriage     Ovarian cyst 2017    ruptured    POTS (postural orthostatic tachycardia syndrome)     Strep pharyngitis        SURGICAL HISTORY     Past Surgical History:   Procedure Laterality Date    ABDOMEN SURGERY      cleaned out drainage from an ovarian cyst rupture    BREAST SURGERY Right     mass removed     SECTION      DENTAL SURGERY         CURRENTMEDICATIONS       Previous Medications    ALBUTEROL SULFATE HFA (VENTOLIN HFA) 108 (90 BASE) MCG/ACT INHALER    Inhale 1 puff into the lungs every 6 hours as needed for Wheezing or Shortness of Breath    BREXPIPRAZOLE (REXULTI) 0.5 MG TABS TABLET    Take 1 tablet by mouth daily    BREXPIPRAZOLE (REXULTI) 0.5 MG TABS TABLET    Take 2 tablets by mouth once    CLONAZEPAM (KLONOPIN) 0.5 MG TABLET    Take 1 tablet by mouth 2 times daily as needed.    DIPHENHYDRAMINE

## 2025-06-25 ENCOUNTER — OFFICE VISIT (OUTPATIENT)
Dept: OBSTETRICS AND GYNECOLOGY | Facility: CLINIC | Age: 30
End: 2025-06-25
Payer: MEDICAID

## 2025-06-25 VITALS
SYSTOLIC BLOOD PRESSURE: 112 MMHG | BODY MASS INDEX: 31.39 KG/M2 | HEIGHT: 67 IN | WEIGHT: 200 LBS | DIASTOLIC BLOOD PRESSURE: 76 MMHG

## 2025-06-25 DIAGNOSIS — N93.9 ABNORMAL UTERINE BLEEDING (AUB): ICD-10-CM

## 2025-06-25 DIAGNOSIS — Z87.42 HISTORY OF ABNORMAL CERVICAL PAP SMEAR: ICD-10-CM

## 2025-06-25 DIAGNOSIS — N94.6 DYSMENORRHEA: ICD-10-CM

## 2025-06-25 DIAGNOSIS — Z01.419 WELL WOMAN EXAM: Primary | ICD-10-CM

## 2025-06-25 DIAGNOSIS — Z12.39 ENCOUNTER FOR BREAST CANCER SCREENING USING NON-MAMMOGRAM MODALITY: ICD-10-CM

## 2025-06-25 DIAGNOSIS — N94.10 FEMALE DYSPAREUNIA: ICD-10-CM

## 2025-06-25 RX ORDER — ACETAMINOPHEN 500 MG
1000 TABLET ORAL
COMMUNITY
Start: 2025-06-18

## 2025-06-25 RX ORDER — ZALEPLON 10 MG/1
CAPSULE ORAL
COMMUNITY
Start: 2025-06-18

## 2025-06-25 RX ORDER — BREXPIPRAZOLE 1 MG/1
TABLET ORAL
COMMUNITY
Start: 2025-03-01

## 2025-06-25 RX ORDER — ALBUTEROL SULFATE 90 UG/1
AEROSOL, METERED RESPIRATORY (INHALATION)
COMMUNITY
Start: 2025-05-01

## 2025-06-25 RX ORDER — OXCARBAZEPINE 300 MG/1
TABLET, EXTENDED RELEASE ORAL
COMMUNITY
Start: 2025-05-01

## 2025-06-25 RX ORDER — BREXPIPRAZOLE 0.25 MG/1
TABLET ORAL
COMMUNITY
Start: 2025-03-01

## 2025-06-25 RX ORDER — ETONOGESTREL AND ETHINYL ESTRADIOL VAGINAL RING .015; .12 MG/D; MG/D
RING VAGINAL
Qty: 3 EACH | Refills: 3 | Status: SHIPPED | OUTPATIENT
Start: 2025-06-25

## 2025-06-25 RX ORDER — CLONAZEPAM 1 MG/1
TABLET ORAL
COMMUNITY
Start: 2025-05-27

## 2025-06-25 RX ORDER — OXCARBAZEPINE 600 MG/1
TABLET ORAL
COMMUNITY
Start: 2025-03-01

## 2025-06-25 RX ORDER — MELOXICAM 15 MG/1
TABLET ORAL
COMMUNITY
Start: 2025-06-03

## 2025-06-25 NOTE — PROGRESS NOTES
Annual Well Woman Visit    Subjective   Chief Complaint   Patient presents with    Gynecologic Exam     Wants to discuss painful periods- previously had dx lap with - wants to discuss Ablation      Ashish Wilkerson is a 30 y.o.  presenting to be seen for an annual well woman visit. She reports menarche at age 10. Her periods are monthly/ predictable. Bleeding lasts 2-7 days. She has very heavy flow and uses both pads and tampons. She has significant cramping associated with her period. She has had an IUD but removed it after 4 days due to significant pain/ concern that it was malpositioned - she was seen in an ER and imaging there indicated malposition. She has been on OCPs and Nuva Ring before. She had side effects with the OCPs and it seemed to interact with her medications. With the Nuva Ring, she conceived despite perfect use.     She does report pain with intercourse. She has pain with insertion and deep penetration. Changing positions will sometimes improve her symptoms.     She is not currently preventing pregnancy but feels like she is likely infertile - she has not conceived for > 1 year despite unprotected intercourse. She is not opposed to having another pregnancy - she has tried to lose weight, adjust her diet etc to try to conceive but does not think these changes have had much effect.    OB Hx:   OB History    Para Term  AB Living   7 2 2  5 2   SAB IAB Ectopic Molar Multiple Live Births   5     2      # Outcome Date GA Lbr Kaushik/2nd Weight Sex Type Anes PTL Lv   7 2024           6 2022           5 2022           4 2018           3 Term 17 39w0d  2948 g (6 lb 8 oz) F CS-LTranv   IRINA   2 2015           1 Term 03/16/15 39w0d  2438 g (5 lb 6 oz) F CS-LTranv   IRINA      Contraception: none  Pap smear: 24, ASCUS, HRHPV (+) --> colposcopy 3/11/24 with ELIAS 1.  Mammogram: N/A  Colonoscopy: N/A  DEXA Scan: N/A    Past Medical History:    Diagnosis Date    Abnormal Pap smear of cervix     Around year 3724-9289    Anemia     Anxiety     Asthma     Depression     Female infertility     HPV (human papilloma virus) infection     Around 1701-9340    Migraine     Ovarian cyst     Recurrent pregnancy loss, antepartum condition or complication      Past Surgical History:   Procedure Laterality Date    BREAST BIOPSY Right 2021    EXCISIONAL BY SURGEON (NO MAMMO OR US IMAGING)    BREAST MASS EXCISION       SECTION      DENTAL PROCEDURE      DIAGNOSTIC LAPAROSCOPY N/A 2017    Procedure: DIAGNOSTIC LAPAROSCOPY;  Surgeon: Dandy Shelby MD;  Location: [facility];  Service:     DIAGNOSTIC LAPAROSCOPY      Dr. Castro, no evidence of endometriosis     Family History   Problem Relation Age of Onset    Ovarian cancer Mother 25    Breast cancer Mother 32    Cystic fibrosis Mother     Lupus Mother      Social History     Tobacco Use    Smoking status: Never     Passive exposure: Current    Smokeless tobacco: Never   Vaping Use    Vaping status: Every Day    Substances: Nicotine    Passive vaping exposure: Yes   Substance Use Topics    Alcohol use: Not Currently    Drug use: Never     (Not in a hospital admission)    Zoloft [sertraline hcl]  Current Outpatient Medications on File Prior to Visit   Medication Sig Dispense Refill    acetaminophen (TYLENOL) 500 MG tablet Take 2 tablets by mouth.      KlonoPIN 1 MG tablet       meloxicam (MOBIC) 15 MG tablet       OXcarbazepine  MG tablet sustained-release 24 hour       Oxtellar  MG tablet sustained-release 24 hour       Rexulti 0.25 MG tablet       Rexulti 1 MG tablet       Ventolin  (90 Base) MCG/ACT inhaler       zaleplon (SONATA) 10 MG capsule       rimegepant sulfate ODT (Nurtec) 75 MG disintegrating tablet Place 1 tablet under the tongue As Needed (HA). 3 tablet 0    rizatriptan (Maxalt) 10 MG tablet Take 1 tablet by mouth Daily As Needed for Migraine. May repeat in 2 hours if  "needed 9 tablet 2    topiramate (Topamax) 50 MG tablet Take 1 tablet by mouth 2 (Two) Times a Day. 180 tablet 1     No current facility-administered medications on file prior to visit.     Social History    Tobacco Use      Smoking status: Never        Passive exposure: Current      Smokeless tobacco: Never    Review of Systems  Pertinent items are noted in HPI, all other systems were reviewed and negative       Objective   /76   Ht 170.2 cm (67\")   Wt 90.7 kg (200 lb)   BMI 31.32 kg/m²     Physical Exam:  General Appearance: alert, interactive, and NAD  Breasts:   Examined in supine position  Symmetric without hard/ fixed masses or skin dimpling  Nipples normal without inversion, lesions or discharge  There are no palpable axillary nodes  Abdomen: no masses, soft, (+) tenderness in RLQ, no guarding and no rebound tenderness  Pelvis:  Pelvic: Clinical staff was present for exam  External genitalia:  normal appearance of the external genitalia including Bartholin's and [place]'s glands.  :  urethral meatus normal  Vagina:  normal pink mucosa without lesions  Cervix:  normal appearance, no lesions, thin discharge present  Uterus:  normal size, shape and consistency, tender to palpation  Adnexa:  normal bimanual exam of the adnexa, no masses       Assessment & Plan    Annual well woman exam with age appropriate screening      Diagnosis Plan   1. Well woman exam  LIQUID-BASED PAP SMEAR WITH HPV GENOTYPING REGARDLESS OF INTERPRETATION (MARIANO,COR,MAD)      2. History of abnormal cervical Pap smear  LIQUID-BASED PAP SMEAR WITH HPV GENOTYPING REGARDLESS OF INTERPRETATION (MARIANO,COR,MAD)      3. Encounter for breast cancer screening using non-mammogram modality        4. Dysmenorrhea  etonogestrel-ethinyl estradiol (NuvaRing) 0.12-0.015 MG/24HR vaginal ring    US Non-ob Transvaginal      5. Abnormal uterine bleeding (AUB)  etonogestrel-ethinyl estradiol (NuvaRing) 0.12-0.015 MG/24HR vaginal ring    US Non-ob " Transvaginal      6. Female dyspareunia  US Non-ob Transvaginal        Medications ordered: Nuva Ring    Procedures performed: Pap    - Mammogram: Not indicated  - Pap screening guidelines reviewed; pap smear performed today. H/o ELIAS 1/ colpo one year ago.  - Healthy diet and exercise encouraged  - Contraception: start Nuva Ring  - Screening: GC/CZ on Pap  - AUB/ dysmenorrhea, dyspareunia: discussed that an ablation is not recommended if considering future fertility. Would recommend trial of hormonal medication to see if better tolerated/ improves symptoms. Plan to resume Nuva Ring and assess response in 3 months. Will also plan TVS at that time to rule out adnexal cyst or other anatomic causes of symptoms.     Follow up 3 months with TVS.    Alice Dunn MD  Obstetrics and Gynecology  [facility]

## 2025-06-26 ENCOUNTER — PATIENT ROUNDING (BHMG ONLY) (OUTPATIENT)
Dept: OBSTETRICS AND GYNECOLOGY | Facility: CLINIC | Age: 30
End: 2025-06-26
Payer: MEDICAID

## 2025-06-26 NOTE — PROGRESS NOTES
June 26, 2025      My name is Abi Uriostegui      I am  with [facility]  [address], [address]  [place] [address] [phone].      I am reaching out to you to officially welcome you to our practice and ask about your recent visit.     Tell me about your visit with us. What things went well?         We're always looking for ways to make our patients' experiences even better. Do you have recommendations on ways we may improve?      Overall were you satisfied with your first visit to our practice?        I would appreciate you taking the time to complete the survey. Your feedback is greatly appreciated! Please reply directly to this e-mail should you have any questions or concerns.      Thank you, and have a great day.

## 2025-07-01 LAB — REF LAB TEST METHOD: NORMAL

## 2025-07-22 ENCOUNTER — OFFICE VISIT (OUTPATIENT)
Age: 30
End: 2025-07-22
Payer: MEDICAID

## 2025-07-22 VITALS
OXYGEN SATURATION: 99 % | RESPIRATION RATE: 16 BRPM | SYSTOLIC BLOOD PRESSURE: 113 MMHG | HEIGHT: 67 IN | DIASTOLIC BLOOD PRESSURE: 77 MMHG | TEMPERATURE: 97.6 F | WEIGHT: 205.6 LBS | BODY MASS INDEX: 32.27 KG/M2 | HEART RATE: 82 BPM

## 2025-07-22 DIAGNOSIS — Z13.220 SCREENING CHOLESTEROL LEVEL: ICD-10-CM

## 2025-07-22 DIAGNOSIS — Z13.29 THYROID DISORDER SCREEN: ICD-10-CM

## 2025-07-22 DIAGNOSIS — E66.811 OBESITY (BMI 30.0-34.9): Primary | ICD-10-CM

## 2025-07-22 DIAGNOSIS — E55.9 VITAMIN D DEFICIENCY: ICD-10-CM

## 2025-07-22 DIAGNOSIS — Z13.1 DIABETES MELLITUS SCREENING: ICD-10-CM

## 2025-07-22 PROCEDURE — 99214 OFFICE O/P EST MOD 30 MIN: CPT | Performed by: NURSE PRACTITIONER

## 2025-07-22 RX ORDER — PHENTERMINE HYDROCHLORIDE 37.5 MG/1
37.5 TABLET ORAL
Qty: 30 TABLET | Refills: 1 | Status: SHIPPED | OUTPATIENT
Start: 2025-07-22 | End: 2025-08-21

## 2025-07-22 RX ORDER — MELOXICAM 15 MG/1
15 TABLET ORAL DAILY
COMMUNITY
Start: 2025-06-03

## 2025-07-22 RX ORDER — FEXOFENADINE HYDROCHLORIDE 180 MG/1
180 TABLET, FILM COATED ORAL DAILY
COMMUNITY
Start: 2025-07-09

## 2025-07-22 RX ORDER — ETONOGESTREL AND ETHINYL ESTRADIOL .015; .12 MG/D; MG/D
1 INSERT, EXTENDED RELEASE VAGINAL SEE ADMIN INSTRUCTIONS
COMMUNITY
Start: 2025-07-19

## 2025-07-22 RX ORDER — NICOTINE POLACRILEX 2 MG
2 LOZENGE BUCCAL 2 TIMES DAILY
COMMUNITY
Start: 2025-07-09

## 2025-07-22 RX ORDER — ESZOPICLONE 3 MG/1
3 TABLET, FILM COATED ORAL NIGHTLY
COMMUNITY
Start: 2025-07-03

## 2025-07-22 RX ORDER — RIZATRIPTAN BENZOATE 10 MG/1
10 TABLET ORAL DAILY PRN
COMMUNITY
Start: 2025-06-04

## 2025-07-22 RX ORDER — EPINEPHRINE 0.3 MG/.3ML
0.3 INJECTION SUBCUTANEOUS ONCE
COMMUNITY
Start: 2025-07-09

## 2025-07-22 RX ORDER — BREXPIPRAZOLE 0.25 MG/1
0.25 TABLET ORAL DAILY
COMMUNITY
Start: 2025-06-10

## 2025-07-22 RX ORDER — TOPIRAMATE 50 MG/1
50 TABLET, FILM COATED ORAL 2 TIMES DAILY
COMMUNITY
Start: 2025-07-19

## 2025-07-22 RX ORDER — BREXPIPRAZOLE 1 MG/1
1 TABLET ORAL DAILY
COMMUNITY
Start: 2025-06-30

## 2025-07-22 RX ORDER — DILTIAZEM HYDROCHLORIDE 60 MG/1
2 TABLET, FILM COATED ORAL
COMMUNITY
Start: 2025-07-09

## 2025-07-22 SDOH — ECONOMIC STABILITY: FOOD INSECURITY: WITHIN THE PAST 12 MONTHS, YOU WORRIED THAT YOUR FOOD WOULD RUN OUT BEFORE YOU GOT MONEY TO BUY MORE.: NEVER TRUE

## 2025-07-22 SDOH — ECONOMIC STABILITY: FOOD INSECURITY: WITHIN THE PAST 12 MONTHS, THE FOOD YOU BOUGHT JUST DIDN'T LAST AND YOU DIDN'T HAVE MONEY TO GET MORE.: NEVER TRUE

## 2025-07-22 ASSESSMENT — PATIENT HEALTH QUESTIONNAIRE - PHQ9
SUM OF ALL RESPONSES TO PHQ QUESTIONS 1-9: 0
1. LITTLE INTEREST OR PLEASURE IN DOING THINGS: NOT AT ALL
SUM OF ALL RESPONSES TO PHQ QUESTIONS 1-9: 0
2. FEELING DOWN, DEPRESSED OR HOPELESS: NOT AT ALL

## 2025-08-15 DIAGNOSIS — G43.909 ACUTE MIGRAINE: ICD-10-CM

## 2025-08-15 RX ORDER — RIZATRIPTAN BENZOATE 10 MG/1
10 TABLET ORAL DAILY PRN
Qty: 9 TABLET | Refills: 3 | Status: SHIPPED | OUTPATIENT
Start: 2025-08-15

## (undated) DEVICE — GLV SURG SIGNATURE TOUCH PF LTX 6.5 STRL BX/50

## (undated) DEVICE — MEDI-VAC YANKAUER SUCTION HANDLE W/BULBOUS TIP: Brand: CARDINAL HEALTH

## (undated) DEVICE — ANTIBACTERIAL UNDYED BRAIDED (POLYGLACTIN 910), SYNTHETIC ABSORBABLE SUTURE: Brand: COATED VICRYL

## (undated) DEVICE — MEDI-VAC NON-CONDUCTIVE SUCTION TUBING: Brand: CARDINAL HEALTH

## (undated) DEVICE — TOTAL TRAY, 16FR 10ML SIL FOLEY, URN: Brand: MEDLINE

## (undated) DEVICE — DRAPE, LAVH, STERILE: Brand: MEDLINE

## (undated) DEVICE — GLV SURG TRIUMPH CLASSIC PF LTX 8.5 STRL

## (undated) DEVICE — GLV SURG SENSICARE W/ALOE PF LF 7.5 STRL

## (undated) DEVICE — DRAPE,TOP,102X53,STERILE: Brand: MEDLINE

## (undated) DEVICE — GLV SURG SENSICARE MICRO PF LF 6 STRL

## (undated) DEVICE — SKIN AFFIX SURG ADHESIVE 72/CS 0.55ML: Brand: MEDLINE

## (undated) DEVICE — COVER,LIGHT HANDLE,FLX,1/PK: Brand: MEDLINE INDUSTRIES, INC.

## (undated) DEVICE — CANNULA,OXY,ADULT,SUPERSOFT,W/7'TUB,UC: Brand: MEDLINE

## (undated) DEVICE — LEX GYN MINOR LAPAROSCOPY: Brand: MEDLINE INDUSTRIES, INC.

## (undated) DEVICE — GLV SURG TRIUMPH ORTHO W/ALOE PF LTX 8 STRL

## (undated) DEVICE — AIRWY 90MM NO9

## (undated) DEVICE — HARMONIC ACE +7 LAPAROSCOPIC SHEARS ADVANCED HEMOSTASIS 5MM DIAMETER 36CM SHAFT LENGTH  FOR USE WITH GRAY HAND PIECE ONLY: Brand: HARMONIC ACE

## (undated) DEVICE — GLV SURG SENSICARE W/ALOE PF LF 8.5 STRL

## (undated) DEVICE — ENDOPATH XCEL BLADELESS TROCARS WITH STABILITY SLEEVES: Brand: ENDOPATH XCEL

## (undated) DEVICE — PAD STEEP TRENDELENBURG W/RAIL STRAP INTEGR ARM PROTECT WING